# Patient Record
Sex: MALE | Race: WHITE | NOT HISPANIC OR LATINO | Employment: OTHER | URBAN - METROPOLITAN AREA
[De-identification: names, ages, dates, MRNs, and addresses within clinical notes are randomized per-mention and may not be internally consistent; named-entity substitution may affect disease eponyms.]

---

## 2018-11-01 PROBLEM — D64.9 ANEMIA, UNSPECIFIED: Status: ACTIVE | Noted: 2018-11-01

## 2018-11-14 DIAGNOSIS — D64.9 ANEMIA, UNSPECIFIED TYPE: ICD-10-CM

## 2018-11-14 LAB
FERRITIN: 1633 NG/ML (ref 30–400)
IRON SATURATION: 22 % (ref 11–46)
IRON: 82 UG/DL (ref 59–158)
TOTAL IRON BINDING CAPACITY: 373 UG/DL (ref 178–450)

## 2018-12-06 DIAGNOSIS — D64.9 ANEMIA, UNSPECIFIED TYPE: ICD-10-CM

## 2018-12-06 LAB
ALBUMIN SERPL-MCNC: 4 G/DL (ref 3.9–4.9)
ALP BLD-CCNC: 46 U/L (ref 35–104)
ALT SERPL-CCNC: 14 U/L (ref 0–41)
ANION GAP SERPL CALCULATED.3IONS-SCNC: 15 MEQ/L (ref 7–13)
AST SERPL-CCNC: 19 U/L (ref 0–40)
BILIRUB SERPL-MCNC: 0.3 MG/DL (ref 0–1.2)
BUN BLDV-MCNC: 28 MG/DL (ref 8–23)
CALCIUM SERPL-MCNC: 9.3 MG/DL (ref 8.6–10.2)
CHLORIDE BLD-SCNC: 103 MEQ/L (ref 98–107)
CO2: 22 MEQ/L (ref 22–29)
CREAT SERPL-MCNC: 1.15 MG/DL (ref 0.7–1.2)
FERRITIN: 1386 NG/ML (ref 30–400)
GFR AFRICAN AMERICAN: >60
GFR NON-AFRICAN AMERICAN: >60
GLOBULIN: 2.6 G/DL (ref 2.3–3.5)
GLUCOSE BLD-MCNC: 93 MG/DL (ref 74–109)
IRON SATURATION: 23 % (ref 11–46)
IRON: 83 UG/DL (ref 59–158)
POTASSIUM SERPL-SCNC: 4.5 MEQ/L (ref 3.5–5.1)
SODIUM BLD-SCNC: 140 MEQ/L (ref 132–144)
TOTAL IRON BINDING CAPACITY: 367 UG/DL (ref 178–450)
TOTAL PROTEIN: 6.6 G/DL (ref 6.4–8.1)

## 2019-01-08 DIAGNOSIS — D64.9 ANEMIA, UNSPECIFIED TYPE: ICD-10-CM

## 2019-01-08 LAB
ALBUMIN SERPL-MCNC: 4.4 G/DL (ref 3.9–4.9)
ALP BLD-CCNC: 78 U/L (ref 35–104)
ALT SERPL-CCNC: <5 U/L (ref 0–41)
ANION GAP SERPL CALCULATED.3IONS-SCNC: 15 MEQ/L (ref 7–13)
AST SERPL-CCNC: <5 U/L (ref 0–40)
BILIRUB SERPL-MCNC: <0.2 MG/DL (ref 0–1.2)
BUN BLDV-MCNC: 23 MG/DL (ref 8–23)
CALCIUM SERPL-MCNC: 9.7 MG/DL (ref 8.6–10.2)
CHLORIDE BLD-SCNC: 103 MEQ/L (ref 98–107)
CO2: 25 MEQ/L (ref 22–29)
CREAT SERPL-MCNC: 1.28 MG/DL (ref 0.7–1.2)
FERRITIN: 1617 NG/ML (ref 30–400)
GFR AFRICAN AMERICAN: >60
GFR NON-AFRICAN AMERICAN: 54.5
GLOBULIN: 2.9 G/DL (ref 2.3–3.5)
GLUCOSE BLD-MCNC: 111 MG/DL (ref 74–109)
IRON SATURATION: 21 % (ref 11–46)
IRON: 79 UG/DL (ref 59–158)
POTASSIUM SERPL-SCNC: 4.6 MEQ/L (ref 3.5–5.1)
SODIUM BLD-SCNC: 143 MEQ/L (ref 132–144)
TOTAL IRON BINDING CAPACITY: 380 UG/DL (ref 178–450)
TOTAL PROTEIN: 7.3 G/DL (ref 6.4–8.1)

## 2019-02-06 PROBLEM — E83.110 HEREDITARY HEMOCHROMATOSIS (HCC): Status: ACTIVE | Noted: 2019-02-06

## 2019-02-28 PROBLEM — K74.69 OTHER CIRRHOSIS OF LIVER (HCC): Status: ACTIVE | Noted: 2019-02-28

## 2019-03-05 DIAGNOSIS — E83.110 HEREDITARY HEMOCHROMATOSIS (HCC): ICD-10-CM

## 2019-03-05 DIAGNOSIS — K74.69 OTHER CIRRHOSIS OF LIVER (HCC): ICD-10-CM

## 2019-03-05 DIAGNOSIS — D64.9 ANEMIA, UNSPECIFIED TYPE: ICD-10-CM

## 2019-03-05 LAB
ALBUMIN SERPL-MCNC: 4.5 G/DL (ref 3.5–4.6)
ALP BLD-CCNC: 55 U/L (ref 35–104)
ALT SERPL-CCNC: 12 U/L (ref 0–41)
ANION GAP SERPL CALCULATED.3IONS-SCNC: 15 MEQ/L (ref 9–15)
AST SERPL-CCNC: 23 U/L (ref 0–40)
BILIRUB SERPL-MCNC: <0.2 MG/DL (ref 0.2–0.7)
BUN BLDV-MCNC: 26 MG/DL (ref 8–23)
CALCIUM SERPL-MCNC: 9.3 MG/DL (ref 8.5–9.9)
CHLORIDE BLD-SCNC: 102 MEQ/L (ref 95–107)
CO2: 23 MEQ/L (ref 20–31)
CREAT SERPL-MCNC: 1.28 MG/DL (ref 0.7–1.2)
FERRITIN: 1002 NG/ML (ref 30–400)
GFR AFRICAN AMERICAN: >60
GFR NON-AFRICAN AMERICAN: 54.5
GLOBULIN: 2.3 G/DL (ref 2.3–3.5)
GLUCOSE BLD-MCNC: 97 MG/DL (ref 70–99)
IRON SATURATION: 18 % (ref 11–46)
IRON: 73 UG/DL (ref 59–158)
POTASSIUM SERPL-SCNC: 4.3 MEQ/L (ref 3.4–4.9)
SODIUM BLD-SCNC: 140 MEQ/L (ref 135–144)
TOTAL IRON BINDING CAPACITY: 406 UG/DL (ref 178–450)
TOTAL PROTEIN: 6.8 G/DL (ref 6.3–8)

## 2019-05-07 DIAGNOSIS — D64.9 ANEMIA, UNSPECIFIED TYPE: ICD-10-CM

## 2019-05-07 DIAGNOSIS — E83.110 HEREDITARY HEMOCHROMATOSIS (HCC): ICD-10-CM

## 2019-05-07 DIAGNOSIS — K74.69 OTHER CIRRHOSIS OF LIVER (HCC): ICD-10-CM

## 2019-05-07 LAB
ALBUMIN SERPL-MCNC: 4.2 G/DL (ref 3.5–4.6)
ALP BLD-CCNC: 51 U/L (ref 35–104)
ALT SERPL-CCNC: 18 U/L (ref 0–41)
ANION GAP SERPL CALCULATED.3IONS-SCNC: 17 MEQ/L (ref 9–15)
AST SERPL-CCNC: 18 U/L (ref 0–40)
BILIRUB SERPL-MCNC: <0.2 MG/DL (ref 0.2–0.7)
BUN BLDV-MCNC: 29 MG/DL (ref 8–23)
CALCIUM SERPL-MCNC: 9.5 MG/DL (ref 8.5–9.9)
CHLORIDE BLD-SCNC: 103 MEQ/L (ref 95–107)
CO2: 21 MEQ/L (ref 20–31)
CREAT SERPL-MCNC: 1.28 MG/DL (ref 0.7–1.2)
FERRITIN: 977.9 NG/ML (ref 30–400)
GFR AFRICAN AMERICAN: >60
GFR NON-AFRICAN AMERICAN: 54.5
GLOBULIN: 2.7 G/DL (ref 2.3–3.5)
GLUCOSE BLD-MCNC: 79 MG/DL (ref 70–99)
POTASSIUM SERPL-SCNC: 4.4 MEQ/L (ref 3.4–4.9)
SODIUM BLD-SCNC: 141 MEQ/L (ref 135–144)
TOTAL PROTEIN: 6.9 G/DL (ref 6.3–8)

## 2023-03-01 PROBLEM — E78.5 HYPERLIPIDEMIA: Status: ACTIVE | Noted: 2023-03-01

## 2023-03-01 PROBLEM — E55.9 VITAMIN D DEFICIENCY: Status: ACTIVE | Noted: 2023-03-01

## 2023-03-01 PROBLEM — M19.90 OSTEOARTHRITIS: Status: ACTIVE | Noted: 2023-03-01

## 2023-03-01 PROBLEM — E83.19 INCREASED STORAGE IRON: Status: ACTIVE | Noted: 2023-03-01

## 2023-03-01 PROBLEM — I44.2 IDIOVENTRICULAR RHYTHM (MULTI): Status: ACTIVE | Noted: 2023-03-01

## 2023-03-01 PROBLEM — D64.9 ANEMIA: Status: ACTIVE | Noted: 2023-03-01

## 2023-03-01 PROBLEM — F32.1 DEPRESSION, MAJOR, SINGLE EPISODE, MODERATE (MULTI): Status: ACTIVE | Noted: 2023-03-01

## 2023-03-01 PROBLEM — M10.9 GOUT: Status: ACTIVE | Noted: 2023-03-01

## 2023-03-01 PROBLEM — J44.9 COPD (CHRONIC OBSTRUCTIVE PULMONARY DISEASE) (MULTI): Status: ACTIVE | Noted: 2023-03-01

## 2023-03-01 PROBLEM — M19.90 ARTHRITIS: Status: ACTIVE | Noted: 2023-03-01

## 2023-03-01 PROBLEM — B00.1 RECURRENT COLD SORES: Status: ACTIVE | Noted: 2023-03-01

## 2023-03-01 PROBLEM — R94.31 ABNORMAL EKG: Status: ACTIVE | Noted: 2023-03-01

## 2023-03-01 PROBLEM — I35.9 AORTIC VALVE DISORDER: Status: ACTIVE | Noted: 2023-03-01

## 2023-03-01 PROBLEM — I45.2 RBBB (RIGHT BUNDLE BRANCH BLOCK WITH LEFT ANTERIOR FASCICULAR BLOCK): Status: ACTIVE | Noted: 2023-03-01

## 2023-03-01 PROBLEM — C44.91 BASAL CELL CARCINOMA: Status: ACTIVE | Noted: 2023-03-01

## 2023-03-01 PROBLEM — J45.909 ASTHMA (HHS-HCC): Status: ACTIVE | Noted: 2023-03-01

## 2023-03-01 PROBLEM — J30.9 ALLERGIC RHINITIS: Status: ACTIVE | Noted: 2023-03-01

## 2023-03-01 PROBLEM — F32.A DEPRESSION: Status: ACTIVE | Noted: 2023-03-01

## 2023-03-01 PROBLEM — I44.30 AV BLOCK: Status: ACTIVE | Noted: 2023-03-01

## 2023-03-01 PROBLEM — I10 HYPERTENSION: Status: ACTIVE | Noted: 2023-03-01

## 2023-03-01 PROBLEM — R00.0 SINUS TACHYCARDIA: Status: ACTIVE | Noted: 2023-03-01

## 2023-03-01 PROBLEM — R73.9 HYPERGLYCEMIA: Status: ACTIVE | Noted: 2023-03-01

## 2023-03-01 PROBLEM — I49.9 IRREGULAR HEART BEAT: Status: ACTIVE | Noted: 2023-03-01

## 2023-03-01 PROBLEM — N18.31 CHRONIC KIDNEY DISEASE, STAGE 3A (MULTI): Status: ACTIVE | Noted: 2023-03-01

## 2023-03-01 PROBLEM — R93.1 ABNORMAL ECHOCARDIOGRAM: Status: ACTIVE | Noted: 2023-03-01

## 2023-03-01 PROBLEM — E53.8 VITAMIN B12 DEFICIENCY: Status: ACTIVE | Noted: 2023-03-01

## 2023-03-01 PROBLEM — K21.9 GERD (GASTROESOPHAGEAL REFLUX DISEASE): Status: ACTIVE | Noted: 2023-03-01

## 2023-03-01 RX ORDER — ALLOPURINOL 300 MG/1
1 TABLET ORAL DAILY
COMMUNITY
End: 2023-10-09

## 2023-03-01 RX ORDER — ACETAMINOPHEN 500 MG
1 TABLET ORAL DAILY
COMMUNITY

## 2023-03-01 RX ORDER — VENLAFAXINE HYDROCHLORIDE 150 MG/1
1 CAPSULE, EXTENDED RELEASE ORAL DAILY
COMMUNITY
Start: 2019-07-05 | End: 2023-12-12 | Stop reason: SDUPTHER

## 2023-03-01 RX ORDER — LISINOPRIL AND HYDROCHLOROTHIAZIDE 20; 25 MG/1; MG/1
1.5 TABLET ORAL DAILY
COMMUNITY
End: 2023-11-03 | Stop reason: SDUPTHER

## 2023-03-01 RX ORDER — VALACYCLOVIR HYDROCHLORIDE 1 G/1
1 TABLET, FILM COATED ORAL DAILY PRN
COMMUNITY

## 2023-03-01 RX ORDER — SIMVASTATIN 10 MG/1
1 TABLET, FILM COATED ORAL DAILY
COMMUNITY
End: 2023-03-20 | Stop reason: SDUPTHER

## 2023-03-01 RX ORDER — CHOLECALCIFEROL (VITAMIN D3) 25 MCG
TABLET,CHEWABLE ORAL
COMMUNITY

## 2023-03-01 RX ORDER — ASPIRIN 81 MG/1
1 TABLET ORAL DAILY
COMMUNITY

## 2023-03-01 RX ORDER — FENOFIBRIC ACID 135 MG/1
1 CAPSULE, DELAYED RELEASE ORAL DAILY
COMMUNITY
Start: 2021-02-02 | End: 2023-11-20 | Stop reason: SDUPTHER

## 2023-03-01 RX ORDER — OMEPRAZOLE 20 MG/1
1 CAPSULE, DELAYED RELEASE ORAL DAILY
COMMUNITY
End: 2023-09-05 | Stop reason: SDUPTHER

## 2023-03-20 ENCOUNTER — LAB (OUTPATIENT)
Dept: LAB | Facility: LAB | Age: 83
End: 2023-03-20
Payer: MEDICARE

## 2023-03-20 ENCOUNTER — OFFICE VISIT (OUTPATIENT)
Dept: PRIMARY CARE | Facility: CLINIC | Age: 83
End: 2023-03-20
Payer: MEDICARE

## 2023-03-20 VITALS
OXYGEN SATURATION: 99 % | BODY MASS INDEX: 27.77 KG/M2 | HEIGHT: 72 IN | SYSTOLIC BLOOD PRESSURE: 126 MMHG | RESPIRATION RATE: 16 BRPM | DIASTOLIC BLOOD PRESSURE: 70 MMHG | TEMPERATURE: 97.7 F | WEIGHT: 205 LBS | HEART RATE: 72 BPM

## 2023-03-20 DIAGNOSIS — F32.1 DEPRESSION, MAJOR, SINGLE EPISODE, MODERATE (MULTI): ICD-10-CM

## 2023-03-20 DIAGNOSIS — I10 PRIMARY HYPERTENSION: ICD-10-CM

## 2023-03-20 DIAGNOSIS — E78.5 HYPERLIPIDEMIA, UNSPECIFIED HYPERLIPIDEMIA TYPE: ICD-10-CM

## 2023-03-20 DIAGNOSIS — D64.9 ANEMIA, UNSPECIFIED TYPE: Primary | ICD-10-CM

## 2023-03-20 DIAGNOSIS — J42 CHRONIC BRONCHITIS, UNSPECIFIED CHRONIC BRONCHITIS TYPE (MULTI): ICD-10-CM

## 2023-03-20 DIAGNOSIS — I44.2 IDIOVENTRICULAR RHYTHM (MULTI): ICD-10-CM

## 2023-03-20 DIAGNOSIS — N18.31 CHRONIC KIDNEY DISEASE, STAGE 3A (MULTI): ICD-10-CM

## 2023-03-20 PROBLEM — R94.31 ABNORMAL EKG: Status: RESOLVED | Noted: 2023-03-01 | Resolved: 2023-03-20

## 2023-03-20 PROBLEM — F32.A DEPRESSION: Status: RESOLVED | Noted: 2023-03-01 | Resolved: 2023-03-20

## 2023-03-20 PROBLEM — R00.0 SINUS TACHYCARDIA: Status: RESOLVED | Noted: 2023-03-01 | Resolved: 2023-03-20

## 2023-03-20 PROBLEM — B00.1 RECURRENT COLD SORES: Status: RESOLVED | Noted: 2023-03-01 | Resolved: 2023-03-20

## 2023-03-20 PROBLEM — I49.9 IRREGULAR HEART BEAT: Status: RESOLVED | Noted: 2023-03-01 | Resolved: 2023-03-20

## 2023-03-20 LAB
ALANINE AMINOTRANSFERASE (SGPT) (U/L) IN SER/PLAS: 18 U/L (ref 10–52)
ALBUMIN (G/DL) IN SER/PLAS: 4.5 G/DL (ref 3.4–5)
ALKALINE PHOSPHATASE (U/L) IN SER/PLAS: 51 U/L (ref 33–136)
ANION GAP IN SER/PLAS: 13 MMOL/L (ref 10–20)
ASPARTATE AMINOTRANSFERASE (SGOT) (U/L) IN SER/PLAS: 20 U/L (ref 9–39)
BILIRUBIN TOTAL (MG/DL) IN SER/PLAS: 0.4 MG/DL (ref 0–1.2)
CALCIUM (MG/DL) IN SER/PLAS: 9.6 MG/DL (ref 8.6–10.3)
CARBON DIOXIDE, TOTAL (MMOL/L) IN SER/PLAS: 27 MMOL/L (ref 21–32)
CHLORIDE (MMOL/L) IN SER/PLAS: 103 MMOL/L (ref 98–107)
CHOLESTEROL (MG/DL) IN SER/PLAS: 232 MG/DL (ref 0–199)
CHOLESTEROL IN HDL (MG/DL) IN SER/PLAS: 39.3 MG/DL
CHOLESTEROL/HDL RATIO: 5.9
COBALAMIN (VITAMIN B12) (PG/ML) IN SER/PLAS: 477 PG/ML (ref 211–911)
CREATININE (MG/DL) IN SER/PLAS: 1.45 MG/DL (ref 0.5–1.3)
ERYTHROCYTE DISTRIBUTION WIDTH (RATIO) BY AUTOMATED COUNT: 13.6 % (ref 11.5–14.5)
ERYTHROCYTE MEAN CORPUSCULAR HEMOGLOBIN CONCENTRATION (G/DL) BY AUTOMATED: 33.2 G/DL (ref 32–36)
ERYTHROCYTE MEAN CORPUSCULAR VOLUME (FL) BY AUTOMATED COUNT: 98 FL (ref 80–100)
ERYTHROCYTES (10*6/UL) IN BLOOD BY AUTOMATED COUNT: 3.78 X10E12/L (ref 4.5–5.9)
GFR MALE: 48 ML/MIN/1.73M2
GLUCOSE (MG/DL) IN SER/PLAS: 92 MG/DL (ref 74–99)
HEMATOCRIT (%) IN BLOOD BY AUTOMATED COUNT: 37.1 % (ref 41–52)
HEMOGLOBIN (G/DL) IN BLOOD: 12.3 G/DL (ref 13.5–17.5)
LDL: ABNORMAL MG/DL (ref 0–99)
LEUKOCYTES (10*3/UL) IN BLOOD BY AUTOMATED COUNT: 7.8 X10E9/L (ref 4.4–11.3)
PLATELETS (10*3/UL) IN BLOOD AUTOMATED COUNT: 188 X10E9/L (ref 150–450)
POTASSIUM (MMOL/L) IN SER/PLAS: 4.3 MMOL/L (ref 3.5–5.3)
PROTEIN TOTAL: 6.8 G/DL (ref 6.4–8.2)
SODIUM (MMOL/L) IN SER/PLAS: 139 MMOL/L (ref 136–145)
TRIGLYCERIDE (MG/DL) IN SER/PLAS: 648 MG/DL (ref 0–149)
UREA NITROGEN (MG/DL) IN SER/PLAS: 24 MG/DL (ref 6–23)
VLDL: ABNORMAL MG/DL (ref 0–40)

## 2023-03-20 PROCEDURE — 80061 LIPID PANEL: CPT

## 2023-03-20 PROCEDURE — 80053 COMPREHEN METABOLIC PANEL: CPT

## 2023-03-20 PROCEDURE — 99214 OFFICE O/P EST MOD 30 MIN: CPT | Performed by: INTERNAL MEDICINE

## 2023-03-20 PROCEDURE — 82607 VITAMIN B-12: CPT

## 2023-03-20 PROCEDURE — 1159F MED LIST DOCD IN RCRD: CPT | Performed by: INTERNAL MEDICINE

## 2023-03-20 PROCEDURE — 1036F TOBACCO NON-USER: CPT | Performed by: INTERNAL MEDICINE

## 2023-03-20 PROCEDURE — 85027 COMPLETE CBC AUTOMATED: CPT

## 2023-03-20 PROCEDURE — 36415 COLL VENOUS BLD VENIPUNCTURE: CPT

## 2023-03-20 PROCEDURE — 3074F SYST BP LT 130 MM HG: CPT | Performed by: INTERNAL MEDICINE

## 2023-03-20 PROCEDURE — 3078F DIAST BP <80 MM HG: CPT | Performed by: INTERNAL MEDICINE

## 2023-03-20 RX ORDER — SIMVASTATIN 10 MG/1
10 TABLET, FILM COATED ORAL DAILY
Qty: 90 TABLET | Refills: 3 | Status: SHIPPED | OUTPATIENT
Start: 2023-03-20 | End: 2024-03-21

## 2023-03-20 ASSESSMENT — PATIENT HEALTH QUESTIONNAIRE - PHQ9
1. LITTLE INTEREST OR PLEASURE IN DOING THINGS: NOT AT ALL
SUM OF ALL RESPONSES TO PHQ9 QUESTIONS 1 AND 2: 0
2. FEELING DOWN, DEPRESSED OR HOPELESS: NOT AT ALL

## 2023-03-20 ASSESSMENT — ENCOUNTER SYMPTOMS
FEVER: 0
COUGH: 0
FATIGUE: 0
SHORTNESS OF BREATH: 0

## 2023-03-20 NOTE — PROGRESS NOTES
Subjective   Jamar Gill is a 83 y.o. male who presents for Hypertension and Hyperlipidemia.    HPI   Is not monitoring BP routinely.  Denies adverse sx's r/t HTN or Hyperlipidemia.  No voiced concerns.    Review of Systems   Constitutional:  Negative for fatigue and fever.   Respiratory:  Negative for cough and shortness of breath.    Cardiovascular:  Negative for chest pain and leg swelling.   All other systems reviewed and are negative.      Health Maintenance Due   Topic Date Due    Medicare Annual Wellness Visit (AWV)  Never done    Derm Melanoma Skin Check  Never done    DTaP/Tdap/Td Vaccines (3 - Td or Tdap) 11/10/2019       Objective   /70   Pulse 72   Temp 36.5 °C (97.7 °F)   Resp 16   Ht 1.829 m (6')   Wt 93 kg (205 lb)   SpO2 99%   BMI 27.80 kg/m²     Physical Exam  Vitals and nursing note reviewed.   Constitutional:       Appearance: Normal appearance.   HENT:      Head: Normocephalic.   Eyes:      Conjunctiva/sclera: Conjunctivae normal.      Pupils: Pupils are equal, round, and reactive to light.   Cardiovascular:      Rate and Rhythm: Normal rate and regular rhythm.      Pulses: Normal pulses.      Heart sounds: Normal heart sounds.   Pulmonary:      Effort: Pulmonary effort is normal.      Breath sounds: Normal breath sounds.   Musculoskeletal:         General: No swelling.      Cervical back: Neck supple.   Skin:     General: Skin is warm and dry.   Neurological:      General: No focal deficit present.      Mental Status: He is oriented to person, place, and time.         Assessment/Plan   Problem List Items Addressed This Visit       Anemia - Primary    Chronic kidney disease, stage 3a    COPD (chronic obstructive pulmonary disease) (CMS/HCC)    Depression, major, single episode, moderate (CMS/HCC)    Hyperlipidemia    Relevant Medications    simvastatin (Zocor) 10 mg tablet    Other Relevant Orders    Lipid Panel    Comprehensive Metabolic Panel    CBC    Vitamin B12    Follow Up  In [facility] - PCP    Hypertension    Idioventricular rhythm (CMS/HCC)     Stable based on symptoms and exam.  Continue established treatment plan and follow up at least yearly.  Check labs   Cont meds  Fu with cardiology and hematology as needed  Fu in 6 months for wellness

## 2023-03-21 LAB — CHOLESTEROL IN LDL (MG/DL) IN SER/PLAS BY DIRECT ASSAY: 129 MG/DL (ref 0–129)

## 2023-03-21 PROCEDURE — 83721 ASSAY OF BLOOD LIPOPROTEIN: CPT

## 2023-03-23 ENCOUNTER — TELEPHONE (OUTPATIENT)
Dept: PRIMARY CARE | Facility: CLINIC | Age: 83
End: 2023-03-23
Payer: MEDICARE

## 2023-03-23 NOTE — TELEPHONE ENCOUNTER
Pt made aware.    ----- Message from Samara Mcqueen CMA sent at 3/23/2023  9:24 AM EDT -----  LMTCB         ----- Message -----  From: Brenda Bains DO  Sent: 3/22/2023  12:44 PM EDT  To: Jerrica Clemens LPN    Call patient his labs look pretty good all stable except his triglycerides have gone up dramatically want him to be sure to take fish oil trilipix and simvastatin and watch animal fats in diet   Will recheck next visit

## 2023-09-05 ENCOUNTER — TELEPHONE (OUTPATIENT)
Dept: PRIMARY CARE | Facility: CLINIC | Age: 83
End: 2023-09-05
Payer: MEDICARE

## 2023-09-05 DIAGNOSIS — K21.9 GASTROESOPHAGEAL REFLUX DISEASE, UNSPECIFIED WHETHER ESOPHAGITIS PRESENT: ICD-10-CM

## 2023-09-05 RX ORDER — OMEPRAZOLE 20 MG/1
20 CAPSULE, DELAYED RELEASE ORAL DAILY
Qty: 90 CAPSULE | Refills: 3 | Status: SHIPPED | OUTPATIENT
Start: 2023-09-05 | End: 2024-09-04

## 2023-09-20 ENCOUNTER — OFFICE VISIT (OUTPATIENT)
Dept: PRIMARY CARE | Facility: CLINIC | Age: 83
End: 2023-09-20
Payer: MEDICARE

## 2023-09-20 ENCOUNTER — LAB (OUTPATIENT)
Dept: LAB | Facility: LAB | Age: 83
End: 2023-09-20
Payer: MEDICARE

## 2023-09-20 VITALS
BODY MASS INDEX: 27.36 KG/M2 | HEART RATE: 72 BPM | DIASTOLIC BLOOD PRESSURE: 76 MMHG | HEIGHT: 72 IN | TEMPERATURE: 97.9 F | SYSTOLIC BLOOD PRESSURE: 130 MMHG | OXYGEN SATURATION: 100 % | RESPIRATION RATE: 16 BRPM | WEIGHT: 202 LBS

## 2023-09-20 DIAGNOSIS — N18.31 CHRONIC KIDNEY DISEASE, STAGE 3A (MULTI): ICD-10-CM

## 2023-09-20 DIAGNOSIS — E78.5 HYPERLIPIDEMIA, UNSPECIFIED HYPERLIPIDEMIA TYPE: ICD-10-CM

## 2023-09-20 DIAGNOSIS — N40.0 BENIGN PROSTATIC HYPERPLASIA WITHOUT LOWER URINARY TRACT SYMPTOMS: ICD-10-CM

## 2023-09-20 DIAGNOSIS — Z00.00 HEALTHCARE MAINTENANCE: ICD-10-CM

## 2023-09-20 DIAGNOSIS — Z23 ENCOUNTER FOR IMMUNIZATION: ICD-10-CM

## 2023-09-20 DIAGNOSIS — I10 PRIMARY HYPERTENSION: ICD-10-CM

## 2023-09-20 DIAGNOSIS — N40.0 BENIGN PROSTATIC HYPERPLASIA WITHOUT LOWER URINARY TRACT SYMPTOMS: Primary | ICD-10-CM

## 2023-09-20 DIAGNOSIS — J41.0 SIMPLE CHRONIC BRONCHITIS (MULTI): ICD-10-CM

## 2023-09-20 DIAGNOSIS — E55.9 VITAMIN D DEFICIENCY: ICD-10-CM

## 2023-09-20 DIAGNOSIS — K74.69 OTHER CIRRHOSIS OF LIVER (MULTI): ICD-10-CM

## 2023-09-20 PROBLEM — D64.9 ANEMIA: Status: RESOLVED | Noted: 2023-03-01 | Resolved: 2023-09-20

## 2023-09-20 PROBLEM — R93.1 ABNORMAL ECHOCARDIOGRAM: Status: RESOLVED | Noted: 2023-03-01 | Resolved: 2023-09-20

## 2023-09-20 PROBLEM — M19.90 ARTHRITIS: Status: RESOLVED | Noted: 2023-03-01 | Resolved: 2023-09-20

## 2023-09-20 PROBLEM — J45.909 ASTHMA (HHS-HCC): Status: RESOLVED | Noted: 2023-03-01 | Resolved: 2023-09-20

## 2023-09-20 PROBLEM — R73.9 HYPERGLYCEMIA: Status: RESOLVED | Noted: 2023-03-01 | Resolved: 2023-09-20

## 2023-09-20 LAB
ALANINE AMINOTRANSFERASE (SGPT) (U/L) IN SER/PLAS: 21 U/L (ref 10–52)
ALBUMIN (G/DL) IN SER/PLAS: 4.3 G/DL (ref 3.4–5)
ALKALINE PHOSPHATASE (U/L) IN SER/PLAS: 47 U/L (ref 33–136)
ANION GAP IN SER/PLAS: 15 MMOL/L (ref 10–20)
ASPARTATE AMINOTRANSFERASE (SGOT) (U/L) IN SER/PLAS: 22 U/L (ref 9–39)
BILIRUBIN TOTAL (MG/DL) IN SER/PLAS: 0.4 MG/DL (ref 0–1.2)
CALCIDIOL (25 OH VITAMIN D3) (NG/ML) IN SER/PLAS: 48 NG/ML
CALCIUM (MG/DL) IN SER/PLAS: 9.7 MG/DL (ref 8.6–10.3)
CARBON DIOXIDE, TOTAL (MMOL/L) IN SER/PLAS: 25 MMOL/L (ref 21–32)
CHLORIDE (MMOL/L) IN SER/PLAS: 102 MMOL/L (ref 98–107)
CHOLESTEROL (MG/DL) IN SER/PLAS: 207 MG/DL (ref 0–199)
CHOLESTEROL IN HDL (MG/DL) IN SER/PLAS: 43 MG/DL
CHOLESTEROL/HDL RATIO: 4.8
COBALAMIN (VITAMIN B12) (PG/ML) IN SER/PLAS: 586 PG/ML (ref 211–911)
CREATININE (MG/DL) IN SER/PLAS: 1.59 MG/DL (ref 0.5–1.3)
ERYTHROCYTE DISTRIBUTION WIDTH (RATIO) BY AUTOMATED COUNT: 13.1 % (ref 11.5–14.5)
ERYTHROCYTE MEAN CORPUSCULAR HEMOGLOBIN CONCENTRATION (G/DL) BY AUTOMATED: 32.8 G/DL (ref 32–36)
ERYTHROCYTE MEAN CORPUSCULAR VOLUME (FL) BY AUTOMATED COUNT: 101 FL (ref 80–100)
ERYTHROCYTES (10*6/UL) IN BLOOD BY AUTOMATED COUNT: 3.7 X10E12/L (ref 4.5–5.9)
GFR MALE: 43 ML/MIN/1.73M2
GLUCOSE (MG/DL) IN SER/PLAS: 98 MG/DL (ref 74–99)
HEMATOCRIT (%) IN BLOOD BY AUTOMATED COUNT: 37.2 % (ref 41–52)
HEMOGLOBIN (G/DL) IN BLOOD: 12.2 G/DL (ref 13.5–17.5)
LDL: 85 MG/DL (ref 0–99)
LEUKOCYTES (10*3/UL) IN BLOOD BY AUTOMATED COUNT: 7.1 X10E9/L (ref 4.4–11.3)
NON HDL CHOLESTEROL: 164 MG/DL
PLATELETS (10*3/UL) IN BLOOD AUTOMATED COUNT: 191 X10E9/L (ref 150–450)
POTASSIUM (MMOL/L) IN SER/PLAS: 4.5 MMOL/L (ref 3.5–5.3)
PROSTATE SPECIFIC ANTIGEN,SCREEN: 2.71 NG/ML (ref 0–4)
PROTEIN TOTAL: 6.8 G/DL (ref 6.4–8.2)
SODIUM (MMOL/L) IN SER/PLAS: 137 MMOL/L (ref 136–145)
THYROTROPIN (MIU/L) IN SER/PLAS BY DETECTION LIMIT <= 0.05 MIU/L: 1.32 MIU/L (ref 0.44–3.98)
TRIGLYCERIDE (MG/DL) IN SER/PLAS: 397 MG/DL (ref 0–149)
UREA NITROGEN (MG/DL) IN SER/PLAS: 28 MG/DL (ref 6–23)
VLDL: 79 MG/DL (ref 0–40)

## 2023-09-20 PROCEDURE — G0439 PPPS, SUBSEQ VISIT: HCPCS | Performed by: INTERNAL MEDICINE

## 2023-09-20 PROCEDURE — 84443 ASSAY THYROID STIM HORMONE: CPT

## 2023-09-20 PROCEDURE — 36415 COLL VENOUS BLD VENIPUNCTURE: CPT

## 2023-09-20 PROCEDURE — 1126F AMNT PAIN NOTED NONE PRSNT: CPT | Performed by: INTERNAL MEDICINE

## 2023-09-20 PROCEDURE — 82607 VITAMIN B-12: CPT

## 2023-09-20 PROCEDURE — 3078F DIAST BP <80 MM HG: CPT | Performed by: INTERNAL MEDICINE

## 2023-09-20 PROCEDURE — 82306 VITAMIN D 25 HYDROXY: CPT

## 2023-09-20 PROCEDURE — 1159F MED LIST DOCD IN RCRD: CPT | Performed by: INTERNAL MEDICINE

## 2023-09-20 PROCEDURE — 1160F RVW MEDS BY RX/DR IN RCRD: CPT | Performed by: INTERNAL MEDICINE

## 2023-09-20 PROCEDURE — 85027 COMPLETE CBC AUTOMATED: CPT

## 2023-09-20 PROCEDURE — 84153 ASSAY OF PSA TOTAL: CPT

## 2023-09-20 PROCEDURE — 80061 LIPID PANEL: CPT

## 2023-09-20 PROCEDURE — G0008 ADMIN INFLUENZA VIRUS VAC: HCPCS | Performed by: INTERNAL MEDICINE

## 2023-09-20 PROCEDURE — 1036F TOBACCO NON-USER: CPT | Performed by: INTERNAL MEDICINE

## 2023-09-20 PROCEDURE — 1170F FXNL STATUS ASSESSED: CPT | Performed by: INTERNAL MEDICINE

## 2023-09-20 PROCEDURE — 90662 IIV NO PRSV INCREASED AG IM: CPT | Performed by: INTERNAL MEDICINE

## 2023-09-20 PROCEDURE — 3075F SYST BP GE 130 - 139MM HG: CPT | Performed by: INTERNAL MEDICINE

## 2023-09-20 PROCEDURE — 99214 OFFICE O/P EST MOD 30 MIN: CPT | Performed by: INTERNAL MEDICINE

## 2023-09-20 PROCEDURE — 80053 COMPREHEN METABOLIC PANEL: CPT

## 2023-09-20 ASSESSMENT — ENCOUNTER SYMPTOMS
FEVER: 0
LOSS OF SENSATION IN FEET: 0
DEPRESSION: 0
FATIGUE: 0
COUGH: 0
SHORTNESS OF BREATH: 0
OCCASIONAL FEELINGS OF UNSTEADINESS: 0

## 2023-09-20 ASSESSMENT — ACTIVITIES OF DAILY LIVING (ADL)
DOING_HOUSEWORK: INDEPENDENT
MANAGING_FINANCES: INDEPENDENT
TAKING_MEDICATION: INDEPENDENT
GROCERY_SHOPPING: INDEPENDENT
DRESSING: INDEPENDENT
BATHING: INDEPENDENT

## 2023-09-20 ASSESSMENT — PATIENT HEALTH QUESTIONNAIRE - PHQ9
2. FEELING DOWN, DEPRESSED OR HOPELESS: NOT AT ALL
1. LITTLE INTEREST OR PLEASURE IN DOING THINGS: NOT AT ALL
SUM OF ALL RESPONSES TO PHQ9 QUESTIONS 1 AND 2: 0

## 2023-09-20 ASSESSMENT — PAIN SCALES - GENERAL: PAINLEVEL: 0-NO PAIN

## 2023-09-20 NOTE — PROGRESS NOTES
Subjective   Reason for Visit: Jamar Gill is an 83 y.o. male here for a Medicare Wellness visit.     Past Medical, Surgical, and Family History reviewed and updated in chart.    Reviewed all medications by prescribing practitioner or clinical pharmacist (such as prescriptions, OTCs, herbal therapies and supplements) and documented in the medical record.    HPI  Influenza 2022 23  Covid 2021  PCV13 2015  PPSV23 2012  Shingrix 2019, 2020  Tdap 2019  Colonoscopy 2023  Adv Dir Yes  Psa 9/22  Bmi 27  Depression screen 23  Eye exam 23  Fall risk 23      Patient Care Team:  Brenda Bains DO as PCP - General  Brenda Bains DO as PCP - [facility] Attributed Provider     Review of Systems   Constitutional:  Negative for fatigue and fever.   Respiratory:  Negative for cough and shortness of breath.    Cardiovascular:  Negative for chest pain and leg swelling.   All other systems reviewed and are negative.      Objective   Vitals:  /76   Pulse 72   Temp 36.6 °C (97.9 °F)   Resp 16   Ht 1.829 m (6')   Wt 91.6 kg (202 lb)   SpO2 100%   BMI 27.40 kg/m²       Physical Exam  Constitutional:       General: He is not in acute distress.     Appearance: Normal appearance. He is well-developed.   HENT:      Head: Normocephalic and atraumatic.      Right Ear: Tympanic membrane and ear canal normal.      Left Ear: Tympanic membrane and ear canal normal.      Nose: Nose normal.      Mouth/Throat:      Mouth: Mucous membranes are moist.      Pharynx: Oropharynx is clear. No oropharyngeal exudate or posterior oropharyngeal erythema.   Eyes:      Conjunctiva/sclera: Conjunctivae normal.      Pupils: Pupils are equal, round, and reactive to light.   Neck:      Thyroid: No thyromegaly.      Vascular: No JVD.   Cardiovascular:      Rate and Rhythm: Normal rate and regular rhythm.      Heart sounds: Normal heart sounds. No murmur heard.  Pulmonary:      Effort: Pulmonary effort is normal. No respiratory distress.       Breath sounds: Normal breath sounds.   Abdominal:      General: Bowel sounds are normal. There is no distension.      Palpations: Abdomen is soft. There is no mass.      Tenderness: There is no abdominal tenderness.   Musculoskeletal:         General: Normal range of motion.      Cervical back: Normal range of motion and neck supple.   Lymphadenopathy:      Cervical: No cervical adenopathy.   Skin:     General: Skin is warm and dry.      Capillary Refill: Capillary refill takes less than 2 seconds.   Neurological:      General: No focal deficit present.      Mental Status: He is alert.         Assessment/Plan   Problem List Items Addressed This Visit       Chronic kidney disease, stage 3a (CMS/HCC)    Overview     21Mar2022      Brenda Bains  Chronic Condition Documentation: Stable based on last GFR. Continue established treatment plan including avoidance of nephrotoxins and follow up at least yearly         COPD (chronic obstructive pulmonary disease) (CMS/HCC)    Overview     21Mar2022      Brenda Bains  Chronic Condition Documentation: Stable based on symptoms and exam Continue established treatment plan and follow up at least yearly         Hyperlipidemia    Overview     [id number]      Brenda Bains  Check labs    Stable based on symptoms and exam Continue established treatment plan and follow up at least yearly         Hypertension    Overview     [id number]      Brenda Bains  Stable based on symptoms and exam Continue established treatment plan and follow up at least yearly         Vitamin D deficiency    Overview     [id number]         Brenda Bains  Check labs          Other Visit Diagnoses       Benign prostatic hyperplasia without lower urinary tract symptoms    -  Primary    Healthcare maintenance            Update preventive  Cont meds   Check labs   Stable based on symptoms and exam.  Continue established treatment plan and follow up at least yearly.

## 2023-09-21 ENCOUNTER — TELEPHONE (OUTPATIENT)
Dept: PRIMARY CARE | Facility: CLINIC | Age: 83
End: 2023-09-21
Payer: MEDICARE

## 2023-09-21 NOTE — TELEPHONE ENCOUNTER
Left vm to make aware and call back w/any questions or concerns.    SERGE Mora LPN  Line busy.  Will reattempt.          Previous Messages       ----- Message -----  From: Brenda Bains DO  Sent: 9/21/2023   2:43 PM EDT  To: Jerrica Clemens LPN    Call patient his labs look good  Chol improving  Kidneys and blood counts stable

## 2023-10-08 DIAGNOSIS — M10.9 GOUT, UNSPECIFIED CAUSE, UNSPECIFIED CHRONICITY, UNSPECIFIED SITE: ICD-10-CM

## 2023-10-09 RX ORDER — ALLOPURINOL 300 MG/1
300 TABLET ORAL DAILY
Qty: 90 TABLET | Refills: 0 | Status: SHIPPED | OUTPATIENT
Start: 2023-10-09 | End: 2024-01-02

## 2023-11-03 ENCOUNTER — TELEPHONE (OUTPATIENT)
Dept: PRIMARY CARE | Facility: CLINIC | Age: 83
End: 2023-11-03
Payer: MEDICARE

## 2023-11-03 DIAGNOSIS — I10 HYPERTENSION, UNSPECIFIED TYPE: ICD-10-CM

## 2023-11-03 RX ORDER — LISINOPRIL AND HYDROCHLOROTHIAZIDE 20; 25 MG/1; MG/1
1.5 TABLET ORAL DAILY
Qty: 135 TABLET | Refills: 3 | Status: SHIPPED | OUTPATIENT
Start: 2023-11-03 | End: 2024-11-02

## 2023-11-03 NOTE — TELEPHONE ENCOUNTER
Needs to have lisinopriL-hydrochlorothiazide 20-25 mg tablet refilled. Send to [facility] [address] [place], [place] - [address] [facility]

## 2023-11-20 ENCOUNTER — TELEPHONE (OUTPATIENT)
Dept: PRIMARY CARE | Facility: CLINIC | Age: 83
End: 2023-11-20
Payer: MEDICARE

## 2023-11-20 DIAGNOSIS — E78.5 HYPERLIPIDEMIA, UNSPECIFIED HYPERLIPIDEMIA TYPE: ICD-10-CM

## 2023-11-20 RX ORDER — FENOFIBRIC ACID 135 MG/1
135 CAPSULE, DELAYED RELEASE ORAL DAILY
Qty: 90 CAPSULE | Refills: 3 | Status: SHIPPED | OUTPATIENT
Start: 2023-11-20 | End: 2024-11-19

## 2023-12-12 ENCOUNTER — TELEPHONE (OUTPATIENT)
Dept: PRIMARY CARE | Facility: CLINIC | Age: 83
End: 2023-12-12
Payer: MEDICARE

## 2023-12-12 DIAGNOSIS — F32.1 DEPRESSION, MAJOR, SINGLE EPISODE, MODERATE (MULTI): ICD-10-CM

## 2023-12-12 RX ORDER — VENLAFAXINE HYDROCHLORIDE 150 MG/1
150 CAPSULE, EXTENDED RELEASE ORAL DAILY
Qty: 90 CAPSULE | Refills: 3 | Status: SHIPPED | OUTPATIENT
Start: 2023-12-12

## 2023-12-12 NOTE — TELEPHONE ENCOUNTER
Needs to have venlafaxine XR (Effexor-XR) 150 mg 24 hr capsule refilled. Send to [facility]/pharmacy #[id number] - [place], [place] - [address] AT [facility] [address]

## 2024-01-02 DIAGNOSIS — M10.9 GOUT, UNSPECIFIED CAUSE, UNSPECIFIED CHRONICITY, UNSPECIFIED SITE: ICD-10-CM

## 2024-01-02 RX ORDER — ALLOPURINOL 300 MG/1
300 TABLET ORAL DAILY
Qty: 90 TABLET | Refills: 3 | Status: SHIPPED | OUTPATIENT
Start: 2024-01-02

## 2024-03-04 ENCOUNTER — APPOINTMENT (OUTPATIENT)
Dept: PRIMARY CARE | Facility: CLINIC | Age: 84
End: 2024-03-04
Payer: MEDICARE

## 2024-03-12 ENCOUNTER — OFFICE VISIT (OUTPATIENT)
Dept: PRIMARY CARE | Facility: CLINIC | Age: 84
End: 2024-03-12
Payer: MEDICARE

## 2024-03-12 VITALS
RESPIRATION RATE: 18 BRPM | OXYGEN SATURATION: 97 % | WEIGHT: 201 LBS | BODY MASS INDEX: 27.26 KG/M2 | DIASTOLIC BLOOD PRESSURE: 76 MMHG | HEART RATE: 80 BPM | SYSTOLIC BLOOD PRESSURE: 128 MMHG | TEMPERATURE: 98.7 F

## 2024-03-12 DIAGNOSIS — R05.1 ACUTE COUGH: Primary | ICD-10-CM

## 2024-03-12 PROCEDURE — 1036F TOBACCO NON-USER: CPT | Performed by: NURSE PRACTITIONER

## 2024-03-12 PROCEDURE — 3078F DIAST BP <80 MM HG: CPT | Performed by: NURSE PRACTITIONER

## 2024-03-12 PROCEDURE — 99213 OFFICE O/P EST LOW 20 MIN: CPT | Performed by: NURSE PRACTITIONER

## 2024-03-12 PROCEDURE — 3074F SYST BP LT 130 MM HG: CPT | Performed by: NURSE PRACTITIONER

## 2024-03-12 PROCEDURE — 87637 SARSCOV2&INF A&B&RSV AMP PRB: CPT

## 2024-03-12 PROCEDURE — 1159F MED LIST DOCD IN RCRD: CPT | Performed by: NURSE PRACTITIONER

## 2024-03-12 PROCEDURE — 1126F AMNT PAIN NOTED NONE PRSNT: CPT | Performed by: NURSE PRACTITIONER

## 2024-03-12 PROCEDURE — 1160F RVW MEDS BY RX/DR IN RCRD: CPT | Performed by: NURSE PRACTITIONER

## 2024-03-12 ASSESSMENT — ENCOUNTER SYMPTOMS
CHILLS: 1
HEADACHES: 1
APPETITE CHANGE: 0
ACTIVITY CHANGE: 0
NAUSEA: 0
DIARRHEA: 0
CONSTIPATION: 0
SORE THROAT: 1
COUGH: 1
VOMITING: 0
FEVER: 1
RHINORRHEA: 1

## 2024-03-12 NOTE — PROGRESS NOTES
Subjective   Patient ID: Jamar Gill is a 84 y.o. male who presents for URI. PT has a home Covid test he can use.    Cold symptoms x1 week  Getting worse  Cough  Chest congestion  Sore throat  Low grade fever at the start/ chills  No GI issues    Has home covid test    OTC- mucinex/ tylenol    URI   Episode onset: 1 week. Associated symptoms include coughing, headaches, rhinorrhea and a sore throat. Pertinent negatives include no diarrhea, nausea or vomiting.        Review of Systems   Constitutional:  Positive for chills and fever. Negative for activity change and appetite change.   HENT:  Positive for postnasal drip, rhinorrhea and sore throat.    Respiratory:  Positive for cough.    Gastrointestinal:  Negative for constipation, diarrhea, nausea and vomiting.   Neurological:  Positive for headaches.   Psychiatric/Behavioral:  Negative for self-injury.        Objective   /76   Pulse 80   Temp 37.1 °C (98.7 °F)   Resp 18   Wt 91.2 kg (201 lb)   SpO2 97%   BMI 27.26 kg/m²     Physical Exam  Vitals reviewed.   Constitutional:       Appearance: Normal appearance.   HENT:      Head: Normocephalic.      Right Ear: Tympanic membrane, ear canal and external ear normal.      Left Ear: Tympanic membrane, ear canal and external ear normal.      Nose: Mucosal edema and congestion present.      Right Turbinates: Swollen.      Left Turbinates: Swollen.      Mouth/Throat:      Lips: Pink.      Mouth: Mucous membranes are moist.      Pharynx: Posterior oropharyngeal erythema present.   Eyes:      Extraocular Movements: Extraocular movements intact.      Conjunctiva/sclera: Conjunctivae normal.      Pupils: Pupils are equal, round, and reactive to light.   Cardiovascular:      Rate and Rhythm: Normal rate and regular rhythm.      Pulses: Normal pulses.      Heart sounds: Normal heart sounds.   Pulmonary:      Effort: Pulmonary effort is normal.      Breath sounds: Normal breath sounds.   Musculoskeletal:       Cervical back: Normal range of motion and neck supple.   Skin:     General: Skin is warm.      Capillary Refill: Capillary refill takes less than 2 seconds.   Neurological:      General: No focal deficit present.      Mental Status: He is alert and oriented to person, place, and time.   Psychiatric:         Mood and Affect: Mood normal.         Behavior: Behavior normal.         Assessment/Plan   Problem List Items Addressed This Visit             ICD-10-CM    Acute cough - Primary R05.1     Flu/Covid/ RSV swab to be sent; Will follow up on results  Encouraged daily use of Flonase and Zyrtec for symptom support  Can use Tylenol as needed for fever or pain  Hydration encouraged  If all testing is negative, will consider doxycycline for acute URI  Follow up with PCP if not improving over the next 3-4 days  ER for any SOB, difficulty breathing, uncontrolled fevers or worsening of symptoms           Relevant Orders    Sars-CoV-2 and Influenza A/B PCR    RSV PCR

## 2024-03-12 NOTE — ASSESSMENT & PLAN NOTE
Flu/Covid/ RSV swab to be sent; Will follow up on results  Encouraged daily use of Flonase and Zyrtec for symptom support  Can use Tylenol as needed for fever or pain  Hydration encouraged  If all testing is negative, will consider doxycycline for acute URI  Follow up with PCP if not improving over the next 3-4 days  ER for any SOB, difficulty breathing, uncontrolled fevers or worsening of symptoms

## 2024-03-13 LAB
FLUAV RNA RESP QL NAA+PROBE: NOT DETECTED
FLUBV RNA RESP QL NAA+PROBE: NOT DETECTED
RSV RNA RESP QL NAA+PROBE: NOT DETECTED
SARS-COV-2 RNA RESP QL NAA+PROBE: DETECTED

## 2024-03-20 ENCOUNTER — LAB (OUTPATIENT)
Dept: LAB | Facility: LAB | Age: 84
End: 2024-03-20
Payer: MEDICARE

## 2024-03-20 ENCOUNTER — OFFICE VISIT (OUTPATIENT)
Dept: PRIMARY CARE | Facility: CLINIC | Age: 84
End: 2024-03-20
Payer: MEDICARE

## 2024-03-20 VITALS
WEIGHT: 201 LBS | DIASTOLIC BLOOD PRESSURE: 78 MMHG | OXYGEN SATURATION: 97 % | HEIGHT: 72 IN | BODY MASS INDEX: 27.22 KG/M2 | TEMPERATURE: 97.6 F | HEART RATE: 72 BPM | SYSTOLIC BLOOD PRESSURE: 120 MMHG | RESPIRATION RATE: 16 BRPM

## 2024-03-20 DIAGNOSIS — E78.2 MIXED HYPERLIPIDEMIA: ICD-10-CM

## 2024-03-20 DIAGNOSIS — D69.2 OTHER NONTHROMBOCYTOPENIC PURPURA (CMS-HCC): ICD-10-CM

## 2024-03-20 DIAGNOSIS — U07.1 COVID-19: ICD-10-CM

## 2024-03-20 DIAGNOSIS — I44.2 IDIOVENTRICULAR RHYTHM (MULTI): ICD-10-CM

## 2024-03-20 DIAGNOSIS — I10 PRIMARY HYPERTENSION: ICD-10-CM

## 2024-03-20 DIAGNOSIS — E83.110 HEREDITARY HEMOCHROMATOSIS (CMS-HCC): Primary | ICD-10-CM

## 2024-03-20 DIAGNOSIS — E83.110 HEREDITARY HEMOCHROMATOSIS (CMS-HCC): ICD-10-CM

## 2024-03-20 DIAGNOSIS — N18.31 CHRONIC KIDNEY DISEASE, STAGE 3A (MULTI): ICD-10-CM

## 2024-03-20 DIAGNOSIS — F32.1 DEPRESSION, MAJOR, SINGLE EPISODE, MODERATE (MULTI): ICD-10-CM

## 2024-03-20 DIAGNOSIS — J41.0 SIMPLE CHRONIC BRONCHITIS (MULTI): ICD-10-CM

## 2024-03-20 DIAGNOSIS — K74.69 OTHER CIRRHOSIS OF LIVER (MULTI): ICD-10-CM

## 2024-03-20 LAB
ALBUMIN SERPL BCP-MCNC: 4.3 G/DL (ref 3.4–5)
ALP SERPL-CCNC: 46 U/L (ref 33–136)
ALT SERPL W P-5'-P-CCNC: 18 U/L (ref 10–52)
ANION GAP SERPL CALC-SCNC: 14 MMOL/L (ref 10–20)
AST SERPL W P-5'-P-CCNC: 18 U/L (ref 9–39)
BILIRUB SERPL-MCNC: 0.5 MG/DL (ref 0–1.2)
BUN SERPL-MCNC: 36 MG/DL (ref 6–23)
CALCIUM SERPL-MCNC: 9.9 MG/DL (ref 8.6–10.3)
CHLORIDE SERPL-SCNC: 104 MMOL/L (ref 98–107)
CHOLEST SERPL-MCNC: 226 MG/DL (ref 0–199)
CHOLESTEROL/HDL RATIO: 8.9
CO2 SERPL-SCNC: 25 MMOL/L (ref 21–32)
CREAT SERPL-MCNC: 1.64 MG/DL (ref 0.5–1.3)
EGFRCR SERPLBLD CKD-EPI 2021: 42 ML/MIN/1.73M*2
ERYTHROCYTE [DISTWIDTH] IN BLOOD BY AUTOMATED COUNT: 13.2 % (ref 11.5–14.5)
FERRITIN SERPL-MCNC: 1252 NG/ML (ref 20–300)
GLUCOSE SERPL-MCNC: 100 MG/DL (ref 74–99)
HCT VFR BLD AUTO: 37.2 % (ref 41–52)
HDLC SERPL-MCNC: 25.4 MG/DL
HGB BLD-MCNC: 12.6 G/DL (ref 13.5–17.5)
IRON SATN MFR SERPL: 30 % (ref 25–45)
IRON SERPL-MCNC: 132 UG/DL (ref 35–150)
LDLC SERPL CALC-MCNC: ABNORMAL MG/DL
MCH RBC QN AUTO: 33.7 PG (ref 26–34)
MCHC RBC AUTO-ENTMCNC: 33.9 G/DL (ref 32–36)
MCV RBC AUTO: 100 FL (ref 80–100)
NON HDL CHOLESTEROL: 201 MG/DL (ref 0–149)
NRBC BLD-RTO: 0 /100 WBCS (ref 0–0)
PLATELET # BLD AUTO: 279 X10*3/UL (ref 150–450)
POTASSIUM SERPL-SCNC: 4.5 MMOL/L (ref 3.5–5.3)
PROT SERPL-MCNC: 6.9 G/DL (ref 6.4–8.2)
RBC # BLD AUTO: 3.74 X10*6/UL (ref 4.5–5.9)
SODIUM SERPL-SCNC: 138 MMOL/L (ref 136–145)
TIBC SERPL-MCNC: 441 UG/DL (ref 240–445)
TRIGL SERPL-MCNC: 569 MG/DL (ref 0–149)
UIBC SERPL-MCNC: 309 UG/DL (ref 110–370)
VLDL: ABNORMAL
WBC # BLD AUTO: 7.1 X10*3/UL (ref 4.4–11.3)

## 2024-03-20 PROCEDURE — 83550 IRON BINDING TEST: CPT

## 2024-03-20 PROCEDURE — 80053 COMPREHEN METABOLIC PANEL: CPT

## 2024-03-20 PROCEDURE — 80061 LIPID PANEL: CPT

## 2024-03-20 PROCEDURE — 36415 COLL VENOUS BLD VENIPUNCTURE: CPT

## 2024-03-20 PROCEDURE — 1036F TOBACCO NON-USER: CPT | Performed by: INTERNAL MEDICINE

## 2024-03-20 PROCEDURE — 82728 ASSAY OF FERRITIN: CPT

## 2024-03-20 PROCEDURE — 1159F MED LIST DOCD IN RCRD: CPT | Performed by: INTERNAL MEDICINE

## 2024-03-20 PROCEDURE — 3078F DIAST BP <80 MM HG: CPT | Performed by: INTERNAL MEDICINE

## 2024-03-20 PROCEDURE — 83540 ASSAY OF IRON: CPT

## 2024-03-20 PROCEDURE — 1123F ACP DISCUSS/DSCN MKR DOCD: CPT | Performed by: INTERNAL MEDICINE

## 2024-03-20 PROCEDURE — 3074F SYST BP LT 130 MM HG: CPT | Performed by: INTERNAL MEDICINE

## 2024-03-20 PROCEDURE — 99214 OFFICE O/P EST MOD 30 MIN: CPT | Performed by: INTERNAL MEDICINE

## 2024-03-20 PROCEDURE — 85027 COMPLETE CBC AUTOMATED: CPT

## 2024-03-20 PROCEDURE — 1160F RVW MEDS BY RX/DR IN RCRD: CPT | Performed by: INTERNAL MEDICINE

## 2024-03-20 ASSESSMENT — PATIENT HEALTH QUESTIONNAIRE - PHQ9
SUM OF ALL RESPONSES TO PHQ9 QUESTIONS 1 AND 2: 0
1. LITTLE INTEREST OR PLEASURE IN DOING THINGS: NOT AT ALL
2. FEELING DOWN, DEPRESSED OR HOPELESS: NOT AT ALL

## 2024-03-20 ASSESSMENT — ENCOUNTER SYMPTOMS
SHORTNESS OF BREATH: 0
FEVER: 0
COUGH: 0
FATIGUE: 0

## 2024-03-20 NOTE — PROGRESS NOTES
Subjective   Jamar Gill is a 84 y.o. male who presents for 6 month Follow-up.    HPI   Denies adverse sx's r/t HTN.  Taking meds as Rx'd.    Covid positive 3/12/24.    Sx's 1-2 weeks prior to [facility] visit.  Sx's have mainly improved. Occasional non-productive cough.  Denies chest pain/heaviness/pressure, dyspnea.    Review of Systems   Constitutional:  Negative for fatigue and fever.   Respiratory:  Negative for cough and shortness of breath.    Cardiovascular:  Negative for chest pain and leg swelling.   All other systems reviewed and are negative.      Health Maintenance Due   Topic Date Due    Derm Melanoma Skin Check  Never done    Hepatitis A Vaccines (1 of 2 - Risk 2-dose series) Never done    Hepatitis B Vaccines (1 of 3 - Risk 3-dose series) Never done    COVID-19 Vaccine (4 - 2023-24 season) 09/01/2023       Objective   /78   Pulse 72   Temp 36.4 °C (97.6 °F)   Resp 16   Ht 1.829 m (6')   Wt 91.2 kg (201 lb)   SpO2 97%   BMI 27.26 kg/m²     Physical Exam  Vitals and nursing note reviewed.   Constitutional:       Appearance: Normal appearance.   HENT:      Head: Normocephalic.   Eyes:      Conjunctiva/sclera: Conjunctivae normal.      Pupils: Pupils are equal, round, and reactive to light.   Cardiovascular:      Rate and Rhythm: Normal rate and regular rhythm.      Pulses: Normal pulses.      Heart sounds: Normal heart sounds.   Pulmonary:      Effort: Pulmonary effort is normal.      Breath sounds: Normal breath sounds.   Musculoskeletal:         General: No swelling.      Cervical back: Neck supple.   Skin:     General: Skin is warm and dry.   Neurological:      General: No focal deficit present.      Mental Status: He is oriented to person, place, and time.         Assessment/Plan   Problem List Items Addressed This Visit       Chronic kidney disease, stage 3a (CMS/HCC)    COPD (chronic obstructive pulmonary disease) (CMS/HCC)    Depression, major, single episode, moderate  (CMS/HCC)    Hyperlipidemia    Relevant Orders    Lipid Panel    Hypertension    Relevant Orders    CBC    Comprehensive Metabolic Panel    Idioventricular rhythm (CMS/HCC)    Other cirrhosis of liver (CMS/HCC)    Hereditary hemochromatosis (CMS/HCC) - Primary    Relevant Orders    Ferritin    Iron and TIBC    Other nonthrombocytopenic purpura (CMS/HCC)     Other Visit Diagnoses       COVID-19              Cont meds  Check labs  Stable based on symptoms and exam.  Continue established treatment plan and follow up at least yearly.

## 2024-03-21 ENCOUNTER — TELEPHONE (OUTPATIENT)
Dept: PRIMARY CARE | Facility: CLINIC | Age: 84
End: 2024-03-21
Payer: MEDICARE

## 2024-03-21 DIAGNOSIS — E78.5 HYPERLIPIDEMIA, UNSPECIFIED HYPERLIPIDEMIA TYPE: ICD-10-CM

## 2024-03-21 RX ORDER — SIMVASTATIN 10 MG/1
10 TABLET, FILM COATED ORAL DAILY
Qty: 90 TABLET | Refills: 3 | Status: SHIPPED | OUTPATIENT
Start: 2024-03-21

## 2024-03-21 NOTE — RESULT ENCOUNTER NOTE
Call patient his lAbs look stable  Triglycerides are up  Rec low animal fat diet and no more than 2 alcoholic drinks per day

## 2024-03-21 NOTE — TELEPHONE ENCOUNTER
----- Message from Brenda Bains DO sent at 3/21/2024  5:10 PM EDT -----  Call patient his lAbs look stable  Triglycerides are up  Rec low animal fat diet and no more than 2 alcoholic drinks per day

## 2024-04-02 ENCOUNTER — OFFICE VISIT (OUTPATIENT)
Dept: CARDIOLOGY | Facility: CLINIC | Age: 84
End: 2024-04-02
Payer: MEDICARE

## 2024-04-02 VITALS
SYSTOLIC BLOOD PRESSURE: 102 MMHG | HEIGHT: 72 IN | BODY MASS INDEX: 27.22 KG/M2 | WEIGHT: 201 LBS | HEART RATE: 72 BPM | DIASTOLIC BLOOD PRESSURE: 56 MMHG

## 2024-04-02 DIAGNOSIS — E78.49 OTHER HYPERLIPIDEMIA: ICD-10-CM

## 2024-04-02 DIAGNOSIS — I35.8 AORTIC VALVE SCLEROSIS: ICD-10-CM

## 2024-04-02 DIAGNOSIS — E83.110 HEREDITARY HEMOCHROMATOSIS (CMS-HCC): ICD-10-CM

## 2024-04-02 DIAGNOSIS — I10 PRIMARY HYPERTENSION: Primary | ICD-10-CM

## 2024-04-02 DIAGNOSIS — R53.83 FATIGUE, UNSPECIFIED TYPE: ICD-10-CM

## 2024-04-02 PROCEDURE — 1036F TOBACCO NON-USER: CPT | Performed by: INTERNAL MEDICINE

## 2024-04-02 PROCEDURE — 3078F DIAST BP <80 MM HG: CPT | Performed by: INTERNAL MEDICINE

## 2024-04-02 PROCEDURE — 1159F MED LIST DOCD IN RCRD: CPT | Performed by: INTERNAL MEDICINE

## 2024-04-02 PROCEDURE — 1123F ACP DISCUSS/DSCN MKR DOCD: CPT | Performed by: INTERNAL MEDICINE

## 2024-04-02 PROCEDURE — 1160F RVW MEDS BY RX/DR IN RCRD: CPT | Performed by: INTERNAL MEDICINE

## 2024-04-02 PROCEDURE — 99214 OFFICE O/P EST MOD 30 MIN: CPT | Performed by: INTERNAL MEDICINE

## 2024-04-02 PROCEDURE — 3074F SYST BP LT 130 MM HG: CPT | Performed by: INTERNAL MEDICINE

## 2024-04-02 NOTE — PATIENT INSTRUCTIONS
"It was my pleasure to meet you.  I look forward to being your cardiologist.  I am a huge believer in communicating with my patients.  Please contact me at any time, if anything is not clear to you regarding anything we have discussed, or if new questions occur to you.     You should increase your intake of fresh fruits and vegetables.  Try to consume 9-12 servings per day of such foods.  You should increase your intake of deep sea fish such as salmon and tuna.  Try to get two servings per week of fish, but if you are a pregnant woman, talk to your obstetrician before increasing your fish intake.  You should increase your intake of unprocessed nuts such as walnuts or almonds.  Increase your intake of plant-based protein.  You should avoid fried foods.  Don't consume sugary or starchy foods and sugary drinks.  Avoid saturated fats.  Try not to dine at restaurants more than once per month, and don't dine at fast food places.  Try to get 7-9 hours of sleep every night.  Try to get 150 minutes per week of moderate intensity exercise (after I have cleared you to start an exercise program).  Try to maintain the appropriate weight for your height based on body mass index (BMI). Maintain your cholesterol, blood sugar, and blood pressure in the recommended respective normal ranges.  There is a wealth of information on the American Heart Association's website regarding this.  Just Google \"Life's Essential 8\" for more information.   Ask me about any of these details  if you have questions.    As your cardiologist, I will be available to you at any time to answer any question you have concerning your heart health.  My staff, Dhiraj can also answer any questions you may have.  Best of luck.       It is important for us to have an accurate list of the medications, supplements, and their doses.  It is also important for us to have an accurate list of your allergies.  Please bring this information to every appointment.  " This is a vital part of the quality of care you receive through all of your providers.

## 2024-04-02 NOTE — PROGRESS NOTES
Chief Complaint:   Please see below.     History Of Present Illness:    Jamar Gill is a 84 y.o. male presenting with aortic sclerosis.    This hypertensive, hyperlipidemic 84 years old man is a former patient of Dr. Arora, followed by Dr. Bains.  His stress echo in February 2021 was negative for ischemia at 4.4 METS.  His  echocardiogram in February 2021 disclosed an EF of about 65%, with sclerotic aortic valve otherwise no major valvular findings.     He has a history of hemochromatosis for which she undergoes periodic phlebotomy, and follows with Dr. Mccarty of hematology for this.      The patient denies chest discomfort, palpitations, orthopnea, PND, syncope, and near syncope.  The patient can do household chores without difficulty.  He can walk 1/4 mile before having to stop due to fatigue.   There has been no change in the patient's functional capacity in the past year.     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  He has CKD3.      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    Past Surgical History:  He has a past surgical history that includes Other surgical history (10/17/2019).      Social History:  He reports that he has never smoked. He has never used smokeless tobacco. He reports current alcohol use of about 12.0 standard drinks of alcohol per week. He reports that he does not use drugs.    Family History:  Family History   Adopted: Yes        Allergies:  Patient has no known allergies.    Outpatient Medications:  Current Outpatient Medications   Medication Instructions     allopurinol (ZYLOPRIM) 300 mg, oral, Daily    aspirin 81 mg EC tablet 1 tablet, oral, Daily    cholecalciferol (Vitamin D-3) 5,000 Units tablet 1 tablet, oral, Daily    choline fenofibrate (TRILIPIX) 135 mg, oral, Daily    cyanocobalamin (Vitamin B-12) 2,500 mcg tablet oral    fish oil concentrate (Omega-3) 120-180 mg capsule oral    lisinopriL-hydrochlorothiazide 20-25 mg tablet 1.5 tablets, oral, Daily    NON FORMULARY 1 capsule, oral, Daily, PREVAGEN 10MG ORAL CAPSULE    NON FORMULARY Unspecified Homeopathic; CBD caps  - Take 1 cap by mouth daily    omeprazole (PRILOSEC) 20 mg, oral, Daily    simvastatin (ZOCOR) 10 mg, oral, Daily    valACYclovir (Valtrex) 1 gram tablet 1 tablet, oral, Daily PRN    venlafaxine XR (EFFEXOR-XR) 150 mg, oral, Daily       Physical Exam:  GENERAL:  pleasant 84 year-old  HEENT: No xanthelasma  NECK: Supple, no palpable adenopathy or thyromegaly  CHEST: Clear to auscultation, respiratory effort unlabored  CARDIAC: RRR, normal S1 and S2, no audible rub, gallop, carotids are brisk, PMI is not displaced; there is a grade 1 systolic murmur heard best at the RSB.  ABD: Active bowel sounds, nontender, no organomegaly, no evidence of ascites  EXT: No clubbing, cyanosis, edema, or tenderness  NEURO: Awake, alert, appropriate, speech is fluent      Last Labs:  CBC -  Lab Results   Component Value Date    WBC 7.1 03/20/2024    HGB 12.6 (L) 03/20/2024    HCT 37.2 (L) 03/20/2024     03/20/2024     03/20/2024       CMP -  Lab Results   Component Value Date    CALCIUM 9.9 03/20/2024    PROT 6.9 03/20/2024    ALBUMIN 4.3 03/20/2024    AST 18 03/20/2024    ALT 18 03/20/2024    ALKPHOS 46 03/20/2024    BILITOT 0.5 03/20/2024       LIPID PANEL -   Lab Results   Component Value Date    CHOL 226 (H) 03/20/2024    TRIG 569 (H) 03/20/2024    HDL 25.4 03/20/2024    CHHDL 8.9 03/20/2024    LDLF 85 09/20/2023    VLDL  03/20/2024      Comment:      Unable to calculate VLDL.    NHDL 201 (H)  03/20/2024       RENAL FUNCTION PANEL -   Lab Results   Component Value Date    GLUCOSE 100 (H) 03/20/2024     03/20/2024    K 4.5 03/20/2024     03/20/2024    CO2 25 03/20/2024    ANIONGAP 14 03/20/2024    BUN 36 (H) 03/20/2024    CREATININE 1.64 (H) 03/20/2024    GFRMALE 43 (A) 09/20/2023    CALCIUM 9.9 03/20/2024    ALBUMIN 4.3 03/20/2024        Lab Results   Component Value Date    HGBA1C 5.4 09/01/2022         Lab review: I have Chemistry CMP:   Lab Results   Component Value Date    ALBUMIN 4.3 03/20/2024    CALCIUM 9.9 03/20/2024    CO2 25 03/20/2024    CREATININE 1.64 (H) 03/20/2024    GLUCOSE 100 (H) 03/20/2024    BILITOT 0.5 03/20/2024    PROT 6.9 03/20/2024    ALT 18 03/20/2024    AST 18 03/20/2024    ALKPHOS 46 03/20/2024   , Chemistry BMP   Lab Results   Component Value Date    GLUCOSE 100 (H) 03/20/2024    CALCIUM 9.9 03/20/2024    CO2 25 03/20/2024    CREATININE 1.64 (H) 03/20/2024   , CBC:  Lab Results   Component Value Date    WBC 7.1 03/20/2024    RBC 3.74 (L) 03/20/2024    HGB 12.6 (L) 03/20/2024    HCT 37.2 (L) 03/20/2024     03/20/2024    MCH 33.7 03/20/2024    MCHC 33.9 03/20/2024    RDW 13.2 03/20/2024    NRBC 0.0 03/20/2024   , and Lipids:   Lab Results   Component Value Date    CHOL 226 (H) 03/20/2024    HDL 25.4 03/20/2024    LDLCALC  03/20/2024      Comment:      The calculation of LDL and VLDL are inaccurate when the Triglycerides are greater than 400 mg/dL or when the patient is non-fasting. If LDL measurement is necessary contact the testing laboratory for an alternative LDL assay.                                  Near   Borderline      AGE      Desirable  Optimal    High     High     Very High     0-19 Y     0 - 109     ---    110-129   >/= 130     ----    20-24 Y     0 - 119     ---    120-159   >/= 160     ----      >24 Y     0 -  99   100-129  130-159   160-189     >/=190      TRIG 569 (H) 03/20/2024     Diagnostic review: I have personally reviewed the result(s)  of the stress echo from 2021 . Please see the report for complete details.    Assessment/Plan   Problem List Items Addressed This Visit          Cardiac and Vasculature    Hyperlipidemia    Hypertension - Primary    Relevant Orders    Follow Up In Cardiology    Aortic valve sclerosis    Relevant Orders    Follow Up In Cardiology       Gastrointestinal and Abdominal    Hereditary hemochromatosis (CMS/HCC)    Relevant Orders    MR cardiac morphology and function w and wo IV contrast       Symptoms and Signs    Fatigue    Relevant Orders    Follow Up In Cardiology     This 84-year-old hypertensive, hyperlipidemic man with chronic kidney disease stage III has symptoms of fatigue after walking about 1/4 mile.  He has a history of hemochromatosis.  As such, he may have cardiac involvement, possibly accounting for some of his symptoms.  He otherwise has no angina or palpitations or other cardiac related complaints.  Our approach:    1.  Cardiac MRI to assess for possible iron overload  2. HTN:  BP is well controlled.   We discussed sodium restriction, lifestyle modification, and the DASH diet.  I advised the patient to check BPs at home daily, and to contact me with an update in one month.  2.  Advised him to check in with his primary care physician regarding his chronic kidney disease and management of noncardiac medical problems as described.    Humberto Lewis MD

## 2024-04-19 ENCOUNTER — HOSPITAL ENCOUNTER (OUTPATIENT)
Dept: RADIOLOGY | Facility: CLINIC | Age: 84
End: 2024-04-19
Payer: MEDICARE

## 2024-04-24 ENCOUNTER — TELEPHONE (OUTPATIENT)
Dept: CARDIOLOGY | Facility: CLINIC | Age: 84
End: 2024-04-24
Payer: MEDICARE

## 2024-04-24 NOTE — TELEPHONE ENCOUNTER
MRI = 4/19/24  OV = 4/30/24  FYI to Dr. Lewis, patient canceled ordered MRI and follow up OV.  Did not reschedule at this time.  ---ssd.

## 2024-04-30 ENCOUNTER — APPOINTMENT (OUTPATIENT)
Dept: CARDIOLOGY | Facility: CLINIC | Age: 84
End: 2024-04-30
Payer: MEDICARE

## 2024-05-13 ENCOUNTER — HOSPITAL ENCOUNTER (OUTPATIENT)
Dept: RADIOLOGY | Facility: CLINIC | Age: 84
End: 2024-05-13
Payer: MEDICARE

## 2024-05-13 ENCOUNTER — APPOINTMENT (OUTPATIENT)
Dept: RADIOLOGY | Facility: CLINIC | Age: 84
End: 2024-05-13
Payer: MEDICARE

## 2024-05-14 ENCOUNTER — HOSPITAL ENCOUNTER (OUTPATIENT)
Dept: RADIOLOGY | Facility: CLINIC | Age: 84
Discharge: HOME | End: 2024-05-14
Payer: MEDICARE

## 2024-05-14 DIAGNOSIS — E83.110 HEREDITARY HEMOCHROMATOSIS (CMS-HCC): ICD-10-CM

## 2024-05-14 PROCEDURE — 75565 CARD MRI VELOC FLOW MAPPING: CPT

## 2024-05-14 PROCEDURE — 75561 CARDIAC MRI FOR MORPH W/DYE: CPT

## 2024-05-14 PROCEDURE — A9575 INJ GADOTERATE MEGLUMI 0.1ML: HCPCS | Mod: MUE | Performed by: INTERNAL MEDICINE

## 2024-05-14 PROCEDURE — 2550000001 HC RX 255 CONTRASTS: Mod: MUE | Performed by: INTERNAL MEDICINE

## 2024-05-14 PROCEDURE — 75565 CARD MRI VELOC FLOW MAPPING: CPT | Performed by: INTERNAL MEDICINE

## 2024-05-14 PROCEDURE — 75561 CARDIAC MRI FOR MORPH W/DYE: CPT | Performed by: INTERNAL MEDICINE

## 2024-05-14 RX ORDER — GADOTERATE MEGLUMINE 376.9 MG/ML
36 INJECTION INTRAVENOUS
Status: COMPLETED | OUTPATIENT
Start: 2024-05-14 | End: 2024-05-14

## 2024-05-14 RX ADMIN — GADOTERATE MEGLUMINE 36 ML: 376.9 INJECTION INTRAVENOUS at 15:31

## 2024-05-21 ENCOUNTER — APPOINTMENT (OUTPATIENT)
Dept: CARDIOLOGY | Facility: CLINIC | Age: 84
End: 2024-05-21
Payer: MEDICARE

## 2024-05-28 ENCOUNTER — APPOINTMENT (OUTPATIENT)
Dept: CARDIOLOGY | Facility: CLINIC | Age: 84
End: 2024-05-28
Payer: MEDICARE

## 2024-06-04 ENCOUNTER — OFFICE VISIT (OUTPATIENT)
Dept: CARDIOLOGY | Facility: CLINIC | Age: 84
End: 2024-06-04
Payer: MEDICARE

## 2024-06-04 VITALS
DIASTOLIC BLOOD PRESSURE: 62 MMHG | BODY MASS INDEX: 26.82 KG/M2 | HEART RATE: 72 BPM | HEIGHT: 72 IN | WEIGHT: 198 LBS | SYSTOLIC BLOOD PRESSURE: 112 MMHG

## 2024-06-04 DIAGNOSIS — I10 PRIMARY HYPERTENSION: ICD-10-CM

## 2024-06-04 DIAGNOSIS — E78.49 OTHER HYPERLIPIDEMIA: Primary | ICD-10-CM

## 2024-06-04 PROCEDURE — 1036F TOBACCO NON-USER: CPT | Performed by: INTERNAL MEDICINE

## 2024-06-04 PROCEDURE — 1159F MED LIST DOCD IN RCRD: CPT | Performed by: INTERNAL MEDICINE

## 2024-06-04 PROCEDURE — 1123F ACP DISCUSS/DSCN MKR DOCD: CPT | Performed by: INTERNAL MEDICINE

## 2024-06-04 PROCEDURE — 3074F SYST BP LT 130 MM HG: CPT | Performed by: INTERNAL MEDICINE

## 2024-06-04 PROCEDURE — 3078F DIAST BP <80 MM HG: CPT | Performed by: INTERNAL MEDICINE

## 2024-06-04 PROCEDURE — 99213 OFFICE O/P EST LOW 20 MIN: CPT | Performed by: INTERNAL MEDICINE

## 2024-06-04 NOTE — PATIENT INSTRUCTIONS

## 2024-07-10 ENCOUNTER — TELEPHONE (OUTPATIENT)
Dept: PEDIATRICS | Facility: CLINIC | Age: 84
End: 2024-07-10
Payer: MEDICARE

## 2024-07-10 DIAGNOSIS — M10.9 GOUT, UNSPECIFIED CAUSE, UNSPECIFIED CHRONICITY, UNSPECIFIED SITE: ICD-10-CM

## 2024-07-10 RX ORDER — ALLOPURINOL 300 MG/1
300 TABLET ORAL DAILY
Qty: 90 TABLET | Refills: 3 | Status: SHIPPED | OUTPATIENT
Start: 2024-07-10

## 2024-08-22 DIAGNOSIS — K21.9 GASTROESOPHAGEAL REFLUX DISEASE, UNSPECIFIED WHETHER ESOPHAGITIS PRESENT: ICD-10-CM

## 2024-08-22 RX ORDER — OMEPRAZOLE 20 MG/1
20 CAPSULE, DELAYED RELEASE ORAL DAILY
Qty: 90 CAPSULE | Refills: 1 | Status: SHIPPED | OUTPATIENT
Start: 2024-08-22

## 2024-09-23 ENCOUNTER — APPOINTMENT (OUTPATIENT)
Dept: PRIMARY CARE | Facility: CLINIC | Age: 84
End: 2024-09-23
Payer: MEDICARE

## 2024-09-23 VITALS
OXYGEN SATURATION: 100 % | DIASTOLIC BLOOD PRESSURE: 72 MMHG | BODY MASS INDEX: 26.95 KG/M2 | TEMPERATURE: 97.9 F | HEIGHT: 72 IN | RESPIRATION RATE: 16 BRPM | WEIGHT: 199 LBS | SYSTOLIC BLOOD PRESSURE: 130 MMHG | HEART RATE: 76 BPM

## 2024-09-23 DIAGNOSIS — Z00.00 HEALTHCARE MAINTENANCE: ICD-10-CM

## 2024-09-23 DIAGNOSIS — Z23 ENCOUNTER FOR IMMUNIZATION: ICD-10-CM

## 2024-09-23 DIAGNOSIS — N18.32 CHRONIC KIDNEY DISEASE, STAGE 3B (MULTI): Primary | ICD-10-CM

## 2024-09-23 DIAGNOSIS — I10 PRIMARY HYPERTENSION: ICD-10-CM

## 2024-09-23 DIAGNOSIS — E78.2 MIXED HYPERLIPIDEMIA: ICD-10-CM

## 2024-09-23 DIAGNOSIS — E55.9 VITAMIN D DEFICIENCY: ICD-10-CM

## 2024-09-23 DIAGNOSIS — E83.110 HEREDITARY HEMOCHROMATOSIS (CMS-HCC): ICD-10-CM

## 2024-09-23 DIAGNOSIS — N40.0 BENIGN PROSTATIC HYPERPLASIA WITHOUT LOWER URINARY TRACT SYMPTOMS: ICD-10-CM

## 2024-09-23 DIAGNOSIS — M15.9 PRIMARY OSTEOARTHRITIS INVOLVING MULTIPLE JOINTS: ICD-10-CM

## 2024-09-23 DIAGNOSIS — J41.0 SIMPLE CHRONIC BRONCHITIS (MULTI): ICD-10-CM

## 2024-09-23 DIAGNOSIS — E53.8 VITAMIN B12 DEFICIENCY: ICD-10-CM

## 2024-09-23 PROBLEM — R05.1 ACUTE COUGH: Status: RESOLVED | Noted: 2024-03-12 | Resolved: 2024-09-23

## 2024-09-23 PROBLEM — R53.83 FATIGUE: Status: RESOLVED | Noted: 2024-04-02 | Resolved: 2024-09-23

## 2024-09-23 PROCEDURE — 3078F DIAST BP <80 MM HG: CPT | Performed by: INTERNAL MEDICINE

## 2024-09-23 PROCEDURE — G0439 PPPS, SUBSEQ VISIT: HCPCS | Performed by: INTERNAL MEDICINE

## 2024-09-23 PROCEDURE — 99214 OFFICE O/P EST MOD 30 MIN: CPT | Performed by: INTERNAL MEDICINE

## 2024-09-23 PROCEDURE — 1158F ADVNC CARE PLAN TLK DOCD: CPT | Performed by: INTERNAL MEDICINE

## 2024-09-23 PROCEDURE — 3075F SYST BP GE 130 - 139MM HG: CPT | Performed by: INTERNAL MEDICINE

## 2024-09-23 PROCEDURE — 1123F ACP DISCUSS/DSCN MKR DOCD: CPT | Performed by: INTERNAL MEDICINE

## 2024-09-23 PROCEDURE — 90662 IIV NO PRSV INCREASED AG IM: CPT | Performed by: INTERNAL MEDICINE

## 2024-09-23 PROCEDURE — G0008 ADMIN INFLUENZA VIRUS VAC: HCPCS | Performed by: INTERNAL MEDICINE

## 2024-09-23 PROCEDURE — 1170F FXNL STATUS ASSESSED: CPT | Performed by: INTERNAL MEDICINE

## 2024-09-23 PROCEDURE — 1036F TOBACCO NON-USER: CPT | Performed by: INTERNAL MEDICINE

## 2024-09-23 PROCEDURE — 1159F MED LIST DOCD IN RCRD: CPT | Performed by: INTERNAL MEDICINE

## 2024-09-23 PROCEDURE — 1160F RVW MEDS BY RX/DR IN RCRD: CPT | Performed by: INTERNAL MEDICINE

## 2024-09-23 ASSESSMENT — ACTIVITIES OF DAILY LIVING (ADL)
MANAGING_FINANCES: INDEPENDENT
GROCERY_SHOPPING: INDEPENDENT
TAKING_MEDICATION: INDEPENDENT
BATHING: INDEPENDENT
DRESSING: INDEPENDENT
DOING_HOUSEWORK: INDEPENDENT

## 2024-09-23 ASSESSMENT — ENCOUNTER SYMPTOMS
COUGH: 0
SHORTNESS OF BREATH: 0
DEPRESSION: 0
OCCASIONAL FEELINGS OF UNSTEADINESS: 0
FEVER: 0
FATIGUE: 0
LOSS OF SENSATION IN FEET: 0

## 2024-09-23 NOTE — PROGRESS NOTES
Subjective   Reason for Visit: Jamar Gill is an 84 y.o. male here for a Medicare Wellness visit.     Past Medical, Surgical, and Family History reviewed and updated in chart.    Reviewed all medications by prescribing practitioner or clinical pharmacist (such as prescriptions, OTCs, herbal therapies and supplements) and documented in the medical record.    HPI  Influenza 2023 24  Covid 2021  PCV13 2015  PPSV23 2012  Shingrix 2019, 2020  Tdap 2019  Colonoscopy 2023  Adv Dir Yes  Psa 9/23  Depression screen 24  Eye exam 23  Fall risk 24  Bmi 26    Just saw dr mak and had labs      Patient Care Team:  Brenda Bains DO as PCP - General  Brenda Bains DO as PCP - [facility] Attributed Provider  Humberto Lewis MD as Consulting Physician (Cardiology)     Review of Systems   Constitutional:  Negative for fatigue and fever.   Respiratory:  Negative for cough and shortness of breath.    Cardiovascular:  Negative for chest pain and leg swelling.   All other systems reviewed and are negative.      Objective   Vitals:  /72   Pulse 76   Temp 36.6 °C (97.9 °F)   Resp 16   Ht 1.829 m (6')   Wt 90.3 kg (199 lb)   SpO2 100%   BMI 26.99 kg/m²       Physical Exam  Vitals and nursing note reviewed.   Constitutional:       Appearance: Normal appearance.   HENT:      Head: Normocephalic.   Eyes:      Conjunctiva/sclera: Conjunctivae normal.      Pupils: Pupils are equal, round, and reactive to light.   Cardiovascular:      Rate and Rhythm: Normal rate and regular rhythm.      Pulses: Normal pulses.      Heart sounds: Normal heart sounds.   Pulmonary:      Effort: Pulmonary effort is normal.      Breath sounds: Normal breath sounds.   Musculoskeletal:         General: No swelling.      Cervical back: Neck supple.   Skin:     General: Skin is warm and dry.   Neurological:      General: No focal deficit present.      Mental Status: He is oriented to person, place, and time.         Assessment &  Plan  Encounter for immunization    Orders:    Flu vaccine, trivalent, preservative free, HIGH-DOSE, age 65y+ (Fluzone)    Chronic kidney disease, stage 3b (Multi)         Healthcare maintenance         Simple chronic bronchitis (Multi)         Primary osteoarthritis involving multiple joints         Hereditary hemochromatosis (CMS-HCC)    Orders:    CBC; Future    Vitamin D deficiency    Orders:    Vitamin D 25-Hydroxy,Total (for eval of Vitamin D levels); Future    Vitamin B12 deficiency    Orders:    Vitamin B12; Future    Primary hypertension         Mixed hyperlipidemia    Orders:    Comprehensive Metabolic Panel; Future    Lipid Panel; Future    Thyroid Stimulating Hormone; Future    Benign prostatic hyperplasia without lower urinary tract symptoms    Orders:    Prostate Specific Antigen, Screen; Future     Update preventive  Cont meds  Check labs  Stable based on symptoms and exam.  Continue established treatment plan and follow up at least yearly. l

## 2024-09-23 NOTE — ASSESSMENT & PLAN NOTE
Orders:    Comprehensive Metabolic Panel; Future    Lipid Panel; Future    Thyroid Stimulating Hormone; Future

## 2024-09-25 ENCOUNTER — LAB (OUTPATIENT)
Dept: LAB | Facility: LAB | Age: 84
End: 2024-09-25
Payer: MEDICARE

## 2024-09-25 DIAGNOSIS — E78.2 MIXED HYPERLIPIDEMIA: ICD-10-CM

## 2024-09-25 DIAGNOSIS — E55.9 VITAMIN D DEFICIENCY: ICD-10-CM

## 2024-09-25 DIAGNOSIS — N40.0 BENIGN PROSTATIC HYPERPLASIA WITHOUT LOWER URINARY TRACT SYMPTOMS: ICD-10-CM

## 2024-09-25 DIAGNOSIS — E53.8 VITAMIN B12 DEFICIENCY: ICD-10-CM

## 2024-09-25 DIAGNOSIS — E83.110 HEREDITARY HEMOCHROMATOSIS (CMS-HCC): ICD-10-CM

## 2024-09-25 LAB
25(OH)D3 SERPL-MCNC: 46 NG/ML (ref 30–100)
ALBUMIN SERPL BCP-MCNC: 4.4 G/DL (ref 3.4–5)
ALP SERPL-CCNC: 55 U/L (ref 33–136)
ALT SERPL W P-5'-P-CCNC: 15 U/L (ref 10–52)
ANION GAP SERPL CALC-SCNC: 13 MMOL/L (ref 10–20)
AST SERPL W P-5'-P-CCNC: 18 U/L (ref 9–39)
BILIRUB SERPL-MCNC: 0.4 MG/DL (ref 0–1.2)
BUN SERPL-MCNC: 28 MG/DL (ref 6–23)
CALCIUM SERPL-MCNC: 9.5 MG/DL (ref 8.6–10.3)
CHLORIDE SERPL-SCNC: 102 MMOL/L (ref 98–107)
CHOLEST SERPL-MCNC: 211 MG/DL (ref 0–199)
CHOLESTEROL/HDL RATIO: 4.6
CO2 SERPL-SCNC: 28 MMOL/L (ref 21–32)
CREAT SERPL-MCNC: 1.47 MG/DL (ref 0.5–1.3)
EGFRCR SERPLBLD CKD-EPI 2021: 47 ML/MIN/1.73M*2
ERYTHROCYTE [DISTWIDTH] IN BLOOD BY AUTOMATED COUNT: 13.4 % (ref 11.5–14.5)
GLUCOSE SERPL-MCNC: 85 MG/DL (ref 74–99)
HCT VFR BLD AUTO: 36.7 % (ref 41–52)
HDLC SERPL-MCNC: 45.7 MG/DL
HGB BLD-MCNC: 12.3 G/DL (ref 13.5–17.5)
LDLC SERPL CALC-MCNC: ABNORMAL MG/DL
MCH RBC QN AUTO: 33.6 PG (ref 26–34)
MCHC RBC AUTO-ENTMCNC: 33.5 G/DL (ref 32–36)
MCV RBC AUTO: 100 FL (ref 80–100)
NON HDL CHOLESTEROL: 165 MG/DL (ref 0–149)
NRBC BLD-RTO: 0 /100 WBCS (ref 0–0)
PLATELET # BLD AUTO: 195 X10*3/UL (ref 150–450)
POTASSIUM SERPL-SCNC: 4.7 MMOL/L (ref 3.5–5.3)
PROT SERPL-MCNC: 6.8 G/DL (ref 6.4–8.2)
PSA SERPL-MCNC: 3.9 NG/ML
RBC # BLD AUTO: 3.66 X10*6/UL (ref 4.5–5.9)
SODIUM SERPL-SCNC: 138 MMOL/L (ref 136–145)
TRIGL SERPL-MCNC: 527 MG/DL (ref 0–149)
TSH SERPL-ACNC: 1.34 MIU/L (ref 0.44–3.98)
VIT B12 SERPL-MCNC: 601 PG/ML (ref 211–911)
VLDL: ABNORMAL
WBC # BLD AUTO: 8.5 X10*3/UL (ref 4.4–11.3)

## 2024-09-25 PROCEDURE — 84153 ASSAY OF PSA TOTAL: CPT

## 2024-09-25 PROCEDURE — 85027 COMPLETE CBC AUTOMATED: CPT

## 2024-09-25 PROCEDURE — 84443 ASSAY THYROID STIM HORMONE: CPT

## 2024-09-25 PROCEDURE — 82306 VITAMIN D 25 HYDROXY: CPT

## 2024-09-25 PROCEDURE — 80061 LIPID PANEL: CPT

## 2024-09-25 PROCEDURE — 36415 COLL VENOUS BLD VENIPUNCTURE: CPT

## 2024-09-25 PROCEDURE — 80053 COMPREHEN METABOLIC PANEL: CPT

## 2024-09-25 PROCEDURE — 82607 VITAMIN B-12: CPT

## 2024-09-26 ENCOUNTER — TELEPHONE (OUTPATIENT)
Dept: PRIMARY CARE | Facility: CLINIC | Age: 84
End: 2024-09-26
Payer: MEDICARE

## 2024-09-26 NOTE — TELEPHONE ENCOUNTER
----- Message from Brenda Bains sent at 9/26/2024  9:02 AM EDT -----  Call patient his labs look good and stable

## 2024-10-03 DIAGNOSIS — M15.0 PRIMARY OSTEOARTHRITIS INVOLVING MULTIPLE JOINTS: Primary | ICD-10-CM

## 2024-10-03 RX ORDER — MELOXICAM 15 MG/1
15 TABLET ORAL DAILY PRN
Qty: 30 TABLET | Refills: 1 | Status: SHIPPED | OUTPATIENT
Start: 2024-10-03 | End: 2025-10-03

## 2024-10-19 DIAGNOSIS — I10 HYPERTENSION, UNSPECIFIED TYPE: ICD-10-CM

## 2024-10-21 RX ORDER — LISINOPRIL AND HYDROCHLOROTHIAZIDE 20; 25 MG/1; MG/1
1.5 TABLET ORAL DAILY
Qty: 135 TABLET | Refills: 2 | Status: SHIPPED | OUTPATIENT
Start: 2024-10-21

## 2024-11-01 DIAGNOSIS — M15.0 PRIMARY OSTEOARTHRITIS INVOLVING MULTIPLE JOINTS: ICD-10-CM

## 2024-11-01 RX ORDER — MELOXICAM 15 MG/1
15 TABLET ORAL DAILY PRN
Qty: 30 TABLET | Refills: 6 | Status: SHIPPED | OUTPATIENT
Start: 2024-11-01 | End: 2025-11-01

## 2024-11-07 DIAGNOSIS — E78.5 HYPERLIPIDEMIA, UNSPECIFIED HYPERLIPIDEMIA TYPE: ICD-10-CM

## 2024-11-07 RX ORDER — FENOFIBRIC ACID 135 MG/1
135 CAPSULE, DELAYED RELEASE ORAL DAILY
Qty: 90 CAPSULE | Refills: 3 | Status: SHIPPED | OUTPATIENT
Start: 2024-11-07

## 2024-11-27 DIAGNOSIS — F32.1 DEPRESSION, MAJOR, SINGLE EPISODE, MODERATE (MULTI): ICD-10-CM

## 2024-11-27 RX ORDER — VENLAFAXINE HYDROCHLORIDE 150 MG/1
150 CAPSULE, EXTENDED RELEASE ORAL DAILY
Qty: 90 CAPSULE | Refills: 3 | Status: SHIPPED | OUTPATIENT
Start: 2024-11-27

## 2024-12-19 ENCOUNTER — OFFICE VISIT (OUTPATIENT)
Dept: PRIMARY CARE | Facility: CLINIC | Age: 84
End: 2024-12-19
Payer: MEDICARE

## 2024-12-19 VITALS
BODY MASS INDEX: 28.04 KG/M2 | DIASTOLIC BLOOD PRESSURE: 80 MMHG | WEIGHT: 207 LBS | SYSTOLIC BLOOD PRESSURE: 128 MMHG | OXYGEN SATURATION: 100 % | TEMPERATURE: 97.8 F | RESPIRATION RATE: 16 BRPM | HEIGHT: 72 IN | HEART RATE: 72 BPM

## 2024-12-19 DIAGNOSIS — R07.81 RIB PAIN ON RIGHT SIDE: Primary | ICD-10-CM

## 2024-12-19 PROCEDURE — 3079F DIAST BP 80-89 MM HG: CPT | Performed by: INTERNAL MEDICINE

## 2024-12-19 PROCEDURE — 1160F RVW MEDS BY RX/DR IN RCRD: CPT | Performed by: INTERNAL MEDICINE

## 2024-12-19 PROCEDURE — 1123F ACP DISCUSS/DSCN MKR DOCD: CPT | Performed by: INTERNAL MEDICINE

## 2024-12-19 PROCEDURE — 99214 OFFICE O/P EST MOD 30 MIN: CPT | Performed by: INTERNAL MEDICINE

## 2024-12-19 PROCEDURE — 93000 ELECTROCARDIOGRAM COMPLETE: CPT | Performed by: INTERNAL MEDICINE

## 2024-12-19 PROCEDURE — 3074F SYST BP LT 130 MM HG: CPT | Performed by: INTERNAL MEDICINE

## 2024-12-19 PROCEDURE — 1159F MED LIST DOCD IN RCRD: CPT | Performed by: INTERNAL MEDICINE

## 2024-12-19 RX ORDER — PREDNISONE 10 MG/1
TABLET ORAL
Qty: 30 TABLET | Refills: 0 | Status: SHIPPED | OUTPATIENT
Start: 2024-12-19 | End: 2024-12-28

## 2024-12-19 NOTE — PROGRESS NOTES
Medicare Wellness Visit  Plan for Preventive Care    A good way for you to stay healthy is to use preventive care.  Medicare covers many services that can help you stay healthy.* The goal of these services is to find any health problems as quickly as possible. Finding problems early can help make them easier to treat.  Your personal plan below lists the services you may need and when they are due.      Health Maintenance Summary     Shingles Vaccine (1 of 2)  Never done    Hepatitis B Vaccine (1 of 3 - 19+ 3-dose series)  Never done    DTaP/Tdap/Td Vaccine (1 - Tdap)  Overdue since 1/2/2004    Pneumococcal Vaccine 0-64 (2 of 2 - PCV)  Overdue since 5/17/2020    COVID-19 Vaccine (3 - Pfizer risk series)  Overdue since 11/4/2021    Medicare Advantage- Medicare Wellness Visit (Yearly - January to December)  Overdue since 1/1/2024    Depression Screening (Yearly)  Due soon on 4/26/2024    Influenza Vaccine (Season Ended)  Next due on 9/1/2024    Colorectal Cancer Risk - Colonoscopy (Every 5 Years)  Next due on 5/20/2026    Meningococcal Vaccine   Aged Out    HPV Vaccine   Aged Out    Colorectal Cancer Screen   Discontinued           Preventive Care for Women and Men    Heart Screenings (Cardiovascular):  Blood tests are used to check your cholesterol, lipid and triglyceride levels. High levels can increase your risk for heart disease and stroke. High levels can be treated with medications, diet and exercise. Lowering your levels can help keep your heart and blood vessels healthy.  Your provider will order these tests if they are needed.    An ultrasound is done to see if you have an abdominal aortic aneurysm (AAA).  This is an enlargement of one of the main blood vessels that delivers blood to the body.   In the [place], 9,000 deaths are caused by AAA.  You may not even know you have this problem and as many as 1 in 3 people will have a serious problem if it is not treated.  Early diagnosis allows for more  Subjective   Jamar Gill is a 84 y.o. male who presents for Chest Pain.    HPI   Pt c/o constant R sided chest pain x2.5 weeks ago.  Radiates into back.  Occasionally throbbing pain.  Denies reflux, N/V, dyspnea, dizziness.  Ibuprofen effective.    Pain in right chest  Lower into upper  No sob  No recent illness  Went to [place] and carried 50lb bag to car  [place] winded and rested  Otherwise breathing is normal  Started taking ibuprofen  It is effective      Review of Systems   Constitutional:  Negative for fatigue and fever.   Respiratory:  Negative for cough and shortness of breath.    Cardiovascular:  Positive for chest pain. Negative for leg swelling.   All other systems reviewed and are negative.      Health Maintenance Due   Topic Date Due    Derm Melanoma Skin Check  Never done    Hepatitis A Vaccines (1 of 2 - Risk 2-dose series) Never done    CKD: Urine Protein Screening  Never done    Hepatitis B Vaccines (1 of 3 - Risk 3-dose series) Never done    RSV High Risk: (Elderly (60+) or Pregnant Population) (1 - 1-dose 75+ series) Never done    COVID-19 Vaccine (4 - 2024-25 season) 09/01/2024       Objective   /80   Pulse 72   Temp 36.6 °C (97.8 °F)   Resp 16   Ht 1.829 m (6')   Wt 93.9 kg (207 lb)   SpO2 100%   BMI 28.07 kg/m²     Physical Exam  Vitals and nursing note reviewed.   Constitutional:       Appearance: Normal appearance.   HENT:      Head: Normocephalic.   Eyes:      Conjunctiva/sclera: Conjunctivae normal.      Pupils: Pupils are equal, round, and reactive to light.   Cardiovascular:      Rate and Rhythm: Normal rate and regular rhythm.      Pulses: Normal pulses.      Heart sounds: Normal heart sounds.   Pulmonary:      Effort: Pulmonary effort is normal.      Breath sounds: Normal breath sounds.   Musculoskeletal:         General: No swelling.      Cervical back: Neck supple.   Skin:     General: Skin is warm and dry.   Neurological:      General: No focal deficit present.       Mental Status: He is oriented to person, place, and time.         Assessment/Plan   Problem List Items Addressed This Visit    None  Visit Diagnoses       Rib pain on right side    -  Primary    Relevant Medications    predniSONE (Deltasone) 10 mg tablet    Other Relevant Orders    XR chest 2 views    ECG 12 lead (Clinic Performed) (Completed)          Will do trial of steroid  EKG  Cxr  To er if worsens  Stable based on symptoms and exam.  Continue established treatment plan and follow up at least yearly.          effective treatment and cure.  If you have a family history of AAA or are a male age 65-75 who has smoked, you are at higher risk of an AAA.  Your provider can order this test, if needed.    Colorectal Screening:  There are many tests that are used to check for cancer of your colon and rectum. You and your provider should discuss what test is best for you and when to have it done.  Options include:  Screening Colonoscopy: exam of the entire colon, seen through a flexible lighted tube.  Flexible Sigmoidoscopy: exam of the last third (sigmoid portion) of the colon and rectum, seen through a flexible lighted tube.  Cologuard DNA stool test: a sample of your stool is used to screen for cancer and unseen blood in your stool.  Fecal Occult Blood Test: a sample of your stool is studied to find any unseen blood    Flu Shot:  An immunization that helps to prevent influenza (the flu). You should get this every year. The best time to get the shot is in the fall.    Pneumococcal Shot:  Vaccines help prevent pneumococcal disease, which is any type of illness caused by Streptococcus pneumoniae bacteria. There are two kinds of pneumococcal vaccines available in the [place]:   Pneumococcal conjugate vaccines (PCV20 or Ohvzlab89®)  Pneumococcal polysaccharide vaccine (PPSV23 or Hkstlwcwn86®)  For those who have never received any pneumococcal conjugate vaccine, CDC recommends PVC20 for adults 65 years or older and adults 19 through 64 years old with certain medical conditions or risk factors.   For those who have previously received PCV13, this should be followed by a dose of PPSV23.     Hepatitis B Shot:  An immunization that helps to protect people from getting Hepatitis B. Hepatitis B is a virus that spreads through contact with infected blood or body fluids. Many people with the virus do not have symptoms.  The virus can lead to serious problems, such as liver disease. Some people are at higher risk than others. Your  doctor will tell you if you need this shot.     Diabetes Screening:  A test to measure sugar (glucose) in your blood is called a fasting blood sugar. Fasting means you cannot have food or drink for at least 8 hours before the test. This test can detect diabetes long before you may notice symptoms.    Glaucoma Screening:  Glaucoma screening is performed by your eye doctor. The test measures the fluid pressure inside your eyes to determine if you have glaucoma.     Hepatitis C Screening:  A blood test to see if you have the hepatitis C virus.  Hepatitis C attacks the liver and is a major cause of chronic liver disease.  Medicare will cover a single screening for all adults born between 1945 & 1965, or high risk patients (people who have injected illegal drugs or people who have had blood transfusions).  High risk patients who continue to inject illegal drugs can be screened for Hepatitis C every year.    Smoking and Tobacco-Use Cessation Counseling:  Tobacco is the single greatest cause of disease and early death in our country today. Medication and counseling together can increase a person’s chance of quitting for good.   Medicare covers two quitting attempts per year, with four counseling sessions per attempt (eight sessions in a 12 month period)    Preventive Screening tests for Women    Screening Mammograms and Breast Exams:  An x-ray of your breasts to check for breast cancer before you or your doctor may be able to feel it.  If breast cancer is found early it can usually be treated with success.    Pelvic Exams and Pap Tests:  An exam to check for cervical and vaginal cancer. A Pap test is a lab test in which cells are taken from your cervix and sent to the lab to look for signs of cervical cancer. If cancer of the cervix is found early, chances for a cure are good. Testing can generally end at age 65, or if a woman has a hysterectomy for a benign condition. Your provider may recommend more frequent testing if  certain abnormal results are found.    Bone Mass Measurements:  A painless x-ray of your bone density to see if you are at risk for a broken bone. Bone density refers to the thickness of bones or how tightly the bone tissue is packed.    Preventive Screening tests for Men    Prostate Screening:  Should you have a prostate cancer test (PSA)?  It is up to you to decide if you want a prostate cancer test. Talk to your clinician to find out if the test is right for you.  Things for you to consider and talk about should include:  Benefits and harms of the test  Your family history  How your race/ethnicity may influence the test  If the test may impact other medical conditions you have  Your values on screenings and treatments    *Medicare pays for many preventive services to keep you healthy. For some of these services, you might have to pay a deductible, coinsurance, and / or copayment.  The amounts vary depending on the type of services you need and the kind of Medicare health plan you have.    For further details on screenings offered by Medicare please visit: https://www.medicare.gov/coverage/preventive-screening-services

## 2024-12-20 ENCOUNTER — HOSPITAL ENCOUNTER (OUTPATIENT)
Dept: RADIOLOGY | Facility: CLINIC | Age: 84
Discharge: HOME | End: 2024-12-20
Payer: MEDICARE

## 2024-12-20 DIAGNOSIS — R07.81 RIB PAIN ON RIGHT SIDE: ICD-10-CM

## 2024-12-20 PROCEDURE — 71046 X-RAY EXAM CHEST 2 VIEWS: CPT

## 2024-12-20 ASSESSMENT — ENCOUNTER SYMPTOMS
FATIGUE: 0
FEVER: 0
COUGH: 0
SHORTNESS OF BREATH: 0

## 2024-12-23 ENCOUNTER — TELEPHONE (OUTPATIENT)
Dept: PRIMARY CARE | Facility: CLINIC | Age: 84
End: 2024-12-23
Payer: MEDICARE

## 2024-12-23 NOTE — TELEPHONE ENCOUNTER
----- Message from Brenda Bains sent at 12/23/2024  8:45 AM EST -----  Call patient his chest xray looks good  How is he doing

## 2025-01-08 ENCOUNTER — APPOINTMENT (OUTPATIENT)
Dept: RADIOLOGY | Facility: HOSPITAL | Age: 85
End: 2025-01-08
Payer: MEDICARE

## 2025-01-08 ENCOUNTER — TELEPHONE (OUTPATIENT)
Dept: PRIMARY CARE | Facility: CLINIC | Age: 85
End: 2025-01-08
Payer: MEDICARE

## 2025-01-08 ENCOUNTER — HOSPITAL ENCOUNTER (EMERGENCY)
Facility: HOSPITAL | Age: 85
Discharge: HOME | End: 2025-01-08
Attending: EMERGENCY MEDICINE
Payer: MEDICARE

## 2025-01-08 ENCOUNTER — APPOINTMENT (OUTPATIENT)
Dept: CARDIOLOGY | Facility: HOSPITAL | Age: 85
End: 2025-01-08
Payer: MEDICARE

## 2025-01-08 VITALS
BODY MASS INDEX: 28.04 KG/M2 | SYSTOLIC BLOOD PRESSURE: 172 MMHG | RESPIRATION RATE: 18 BRPM | HEART RATE: 63 BPM | TEMPERATURE: 97.7 F | OXYGEN SATURATION: 97 % | WEIGHT: 207 LBS | HEIGHT: 72 IN | DIASTOLIC BLOOD PRESSURE: 79 MMHG

## 2025-01-08 DIAGNOSIS — R07.9 CHEST PAIN, UNSPECIFIED TYPE: Primary | ICD-10-CM

## 2025-01-08 LAB
ALBUMIN SERPL BCP-MCNC: 4.4 G/DL (ref 3.4–5)
ALP SERPL-CCNC: 36 U/L (ref 33–136)
ALT SERPL W P-5'-P-CCNC: 17 U/L (ref 10–52)
ANION GAP SERPL CALC-SCNC: 15 MMOL/L (ref 10–20)
AST SERPL W P-5'-P-CCNC: 17 U/L (ref 9–39)
BASOPHILS # BLD AUTO: 0.02 X10*3/UL (ref 0–0.1)
BASOPHILS NFR BLD AUTO: 0.2 %
BILIRUB SERPL-MCNC: 0.5 MG/DL (ref 0–1.2)
BNP SERPL-MCNC: 97 PG/ML (ref 0–99)
BUN SERPL-MCNC: 33 MG/DL (ref 6–23)
CALCIUM SERPL-MCNC: 9.2 MG/DL (ref 8.6–10.3)
CARDIAC TROPONIN I PNL SERPL HS: 12 NG/L (ref 0–20)
CARDIAC TROPONIN I PNL SERPL HS: 12 NG/L (ref 0–20)
CHLORIDE SERPL-SCNC: 107 MMOL/L (ref 98–107)
CO2 SERPL-SCNC: 23 MMOL/L (ref 21–32)
CREAT SERPL-MCNC: 1.59 MG/DL (ref 0.5–1.3)
D DIMER PPP FEU-MCNC: 869 NG/ML FEU
EGFRCR SERPLBLD CKD-EPI 2021: 43 ML/MIN/1.73M*2
EOSINOPHIL # BLD AUTO: 0.15 X10*3/UL (ref 0–0.4)
EOSINOPHIL NFR BLD AUTO: 1.8 %
ERYTHROCYTE [DISTWIDTH] IN BLOOD BY AUTOMATED COUNT: 13.6 % (ref 11.5–14.5)
GLUCOSE SERPL-MCNC: 94 MG/DL (ref 74–99)
HCT VFR BLD AUTO: 36.2 % (ref 41–52)
HGB BLD-MCNC: 12 G/DL (ref 13.5–17.5)
IMM GRANULOCYTES # BLD AUTO: 0.04 X10*3/UL (ref 0–0.5)
IMM GRANULOCYTES NFR BLD AUTO: 0.5 % (ref 0–0.9)
INR PPP: 1 (ref 0.9–1.1)
LYMPHOCYTES # BLD AUTO: 2.26 X10*3/UL (ref 0.8–3)
LYMPHOCYTES NFR BLD AUTO: 26.7 %
MCH RBC QN AUTO: 32.5 PG (ref 26–34)
MCHC RBC AUTO-ENTMCNC: 33.1 G/DL (ref 32–36)
MCV RBC AUTO: 98 FL (ref 80–100)
MONOCYTES # BLD AUTO: 0.63 X10*3/UL (ref 0.05–0.8)
MONOCYTES NFR BLD AUTO: 7.5 %
NEUTROPHILS # BLD AUTO: 5.35 X10*3/UL (ref 1.6–5.5)
NEUTROPHILS NFR BLD AUTO: 63.3 %
NRBC BLD-RTO: 0 /100 WBCS (ref 0–0)
PLATELET # BLD AUTO: 163 X10*3/UL (ref 150–450)
POTASSIUM SERPL-SCNC: 4 MMOL/L (ref 3.5–5.3)
PROT SERPL-MCNC: 6.5 G/DL (ref 6.4–8.2)
PROTHROMBIN TIME: 11.2 SECONDS (ref 9.8–12.8)
RBC # BLD AUTO: 3.69 X10*6/UL (ref 4.5–5.9)
SODIUM SERPL-SCNC: 141 MMOL/L (ref 136–145)
WBC # BLD AUTO: 8.5 X10*3/UL (ref 4.4–11.3)

## 2025-01-08 PROCEDURE — 2550000001 HC RX 255 CONTRASTS: Performed by: EMERGENCY MEDICINE

## 2025-01-08 PROCEDURE — 36415 COLL VENOUS BLD VENIPUNCTURE: CPT | Performed by: EMERGENCY MEDICINE

## 2025-01-08 PROCEDURE — 71045 X-RAY EXAM CHEST 1 VIEW: CPT | Mod: FOREIGN READ | Performed by: RADIOLOGY

## 2025-01-08 PROCEDURE — 84484 ASSAY OF TROPONIN QUANT: CPT | Performed by: EMERGENCY MEDICINE

## 2025-01-08 PROCEDURE — 85610 PROTHROMBIN TIME: CPT | Performed by: EMERGENCY MEDICINE

## 2025-01-08 PROCEDURE — 71275 CT ANGIOGRAPHY CHEST: CPT

## 2025-01-08 PROCEDURE — 71045 X-RAY EXAM CHEST 1 VIEW: CPT

## 2025-01-08 PROCEDURE — 83880 ASSAY OF NATRIURETIC PEPTIDE: CPT | Performed by: EMERGENCY MEDICINE

## 2025-01-08 PROCEDURE — 2500000005 HC RX 250 GENERAL PHARMACY W/O HCPCS

## 2025-01-08 PROCEDURE — 93005 ELECTROCARDIOGRAM TRACING: CPT

## 2025-01-08 PROCEDURE — 2500000001 HC RX 250 WO HCPCS SELF ADMINISTERED DRUGS (ALT 637 FOR MEDICARE OP)

## 2025-01-08 PROCEDURE — 85379 FIBRIN DEGRADATION QUANT: CPT | Performed by: EMERGENCY MEDICINE

## 2025-01-08 PROCEDURE — 85025 COMPLETE CBC W/AUTO DIFF WBC: CPT | Performed by: EMERGENCY MEDICINE

## 2025-01-08 PROCEDURE — 80053 COMPREHEN METABOLIC PANEL: CPT | Performed by: EMERGENCY MEDICINE

## 2025-01-08 PROCEDURE — 99285 EMERGENCY DEPT VISIT HI MDM: CPT | Mod: 25 | Performed by: EMERGENCY MEDICINE

## 2025-01-08 RX ORDER — LIDOCAINE 560 MG/1
1 PATCH PERCUTANEOUS; TOPICAL; TRANSDERMAL ONCE
Status: DISCONTINUED | OUTPATIENT
Start: 2025-01-08 | End: 2025-01-08 | Stop reason: HOSPADM

## 2025-01-08 RX ORDER — ACETAMINOPHEN 325 MG/1
975 TABLET ORAL ONCE
Status: COMPLETED | OUTPATIENT
Start: 2025-01-08 | End: 2025-01-08

## 2025-01-08 RX ADMIN — IOHEXOL 60 ML: 350 INJECTION, SOLUTION INTRAVENOUS at 17:34

## 2025-01-08 RX ADMIN — LIDOCAINE 4% 1 PATCH: 40 PATCH TOPICAL at 16:35

## 2025-01-08 RX ADMIN — ACETAMINOPHEN 975 MG: 325 TABLET ORAL at 16:35

## 2025-01-08 ASSESSMENT — PAIN DESCRIPTION - LOCATION
LOCATION: CHEST
LOCATION: CHEST

## 2025-01-08 ASSESSMENT — PAIN DESCRIPTION - DESCRIPTORS
DESCRIPTORS: CRAMPING
DESCRIPTORS: ACHING
DESCRIPTORS: ACHING

## 2025-01-08 ASSESSMENT — COLUMBIA-SUICIDE SEVERITY RATING SCALE - C-SSRS
1. IN THE PAST MONTH, HAVE YOU WISHED YOU WERE DEAD OR WISHED YOU COULD GO TO SLEEP AND NOT WAKE UP?: NO
6. HAVE YOU EVER DONE ANYTHING, STARTED TO DO ANYTHING, OR PREPARED TO DO ANYTHING TO END YOUR LIFE?: NO
2. HAVE YOU ACTUALLY HAD ANY THOUGHTS OF KILLING YOURSELF?: NO

## 2025-01-08 ASSESSMENT — PAIN SCALES - GENERAL
PAINLEVEL_OUTOF10: 5 - MODERATE PAIN
PAINLEVEL_OUTOF10: 7
PAINLEVEL_OUTOF10: 3
PAINLEVEL_OUTOF10: 5 - MODERATE PAIN
PAINLEVEL_OUTOF10: 7

## 2025-01-08 ASSESSMENT — LIFESTYLE VARIABLES
HAVE PEOPLE ANNOYED YOU BY CRITICIZING YOUR DRINKING: NO
EVER FELT BAD OR GUILTY ABOUT YOUR DRINKING: NO
HAVE YOU EVER FELT YOU SHOULD CUT DOWN ON YOUR DRINKING: NO
TOTAL SCORE: 0
EVER HAD A DRINK FIRST THING IN THE MORNING TO STEADY YOUR NERVES TO GET RID OF A HANGOVER: NO

## 2025-01-08 ASSESSMENT — PAIN DESCRIPTION - ONSET: ONSET: GRADUAL

## 2025-01-08 ASSESSMENT — PAIN DESCRIPTION - PROGRESSION
CLINICAL_PROGRESSION: GRADUALLY WORSENING
CLINICAL_PROGRESSION: GRADUALLY IMPROVING

## 2025-01-08 ASSESSMENT — PAIN DESCRIPTION - FREQUENCY: FREQUENCY: INTERMITTENT

## 2025-01-08 ASSESSMENT — PAIN - FUNCTIONAL ASSESSMENT
PAIN_FUNCTIONAL_ASSESSMENT: 0-10
PAIN_FUNCTIONAL_ASSESSMENT: 0-10

## 2025-01-08 ASSESSMENT — PAIN DESCRIPTION - ORIENTATION
ORIENTATION: UPPER;LEFT
ORIENTATION: RIGHT

## 2025-01-08 NOTE — TELEPHONE ENCOUNTER
Patient calling states CP has never gone away from seeing you last, has progressively got worse.  Wants to make appointment to follow up from December appointment.  After consulting with Dr Bains advised ER for eval  to rule out heart issues and then call for follow up.  Patient agreed.

## 2025-01-08 NOTE — ED PROVIDER NOTES
EMERGENCY DEPARTMENT ENCOUNTER      Pt Name: Jamar Gill  MRN: [id number]  Birthdate 1940  Date of evaluation: 1/8/2025  Provider: Brien Bernard DO    CHIEF COMPLAINT       Chief Complaint   Patient presents with    Chest Pain     Patient states chest pain x 3 weeks         HISTORY OF PRESENT ILLNESS    HPI    84-year-old male with past medical history significant for CKD 3, hypertension, hyperlipidemia, reflux, hemochromatosis, known right bundle branch block and aortic stenosis, recent cardiac MRI in May which was unremarkable presenting to the Emergency Department for evaluation of chest pain over the past 3 weeks.  Patient states his chest pain initially started in his right lower ribs and then progressed up into his right upper chest and radiates into his back.  States his pain is sometimes dull sometimes sharp and is worse in certain positions while lying in bed.  Pain does improve with Motrin.  Chest pain is not exertional and patient denies any shortness of breath, diaphoresis, lower extremity pain or swelling.  Patient denies any personal or family history of cardiac disease.  Patient reports a history of basal cell carcinoma but otherwise no history of cancer.  Patient states he was sent home with prescription for prednisone taper which he states he thinks might have improved his symptoms.  Called his primary care physician today and told her that his chest pain has been getting worse and was told to go to the Emergency Department for evaluation.    Nursing Notes were reviewed.    PAST MEDICAL HISTORY     Past Medical History:   Diagnosis Date    Other disorders of iron metabolism 09/30/2021    Increased storage iron    Personal history of other endocrine, nutritional and metabolic disease 05/10/2021    History of hyperlipidemia         SURGICAL HISTORY       Past Surgical History:   Procedure Laterality Date    OTHER SURGICAL HISTORY  10/17/2019    Tonsillectomy with adenoidectomy          CURRENT MEDICATIONS       Discharge Medication List as of 1/8/2025  8:14 PM        CONTINUE these medications which have NOT CHANGED    Details   allopurinol (Zyloprim) 300 mg tablet Take 1 tablet (300 mg) by mouth once daily., Starting Wed 7/10/2024, Normal      aspirin 81 mg EC tablet Take 1 tablet (81 mg) by mouth once daily., Historical Med      cholecalciferol (Vitamin D-3) 5,000 Units tablet Take 1 tablet (5,000 Units) by mouth once daily., Historical Med      choline fenofibrate (Trilipix) 135 mg DR capsule TAKE 1 CAPSULE BY MOUTH ONCE DAILY, Starting Thu 11/7/2024, Normal      cyanocobalamin (Vitamin B-12) 2,500 mcg tablet Take by mouth., Historical Med      fish oil concentrate (Omega-3) 120-180 mg capsule Take by mouth., Historical Med      lisinopriL-hydrochlorothiazide 20-25 mg tablet TAKE 1 AND 1/2 TABLETS BY MOUTH ONCE DAILY, Starting Mon 10/21/2024, Normal      meloxicam (Mobic) 15 mg tablet Take 1 tablet (15 mg) by mouth once daily as needed for moderate pain (4 - 6)., Starting Fri 11/1/2024, Until Sat 11/1/2025 at 2359, Normal      NON FORMULARY Unspecified Homeopathic; CBD caps  - Take 1 cap by mouth daily, Starting Mon 3/21/2022, Historical Med      omeprazole (PriLOSEC) 20 mg DR capsule TAKE 1 CAPSULE BY MOUTH EVERY DAY, Starting Thu 8/22/2024, Normal      simvastatin (Zocor) 10 mg tablet TAKE 1 TABLET BY MOUTH EVERY DAY, Starting Thu 3/21/2024, Normal      valACYclovir (Valtrex) 1 gram tablet Take 1 tablet (1,000 mg) by mouth once daily as needed., Historical Med      venlafaxine XR (Effexor-XR) 150 mg 24 hr capsule TAKE 1 CAPSULE BY MOUTH ONCE DAILY., Starting Wed 11/27/2024, Normal             ALLERGIES     Patient has no known allergies.    FAMILY HISTORY       Family History   Adopted: Yes          SOCIAL HISTORY       Social History     Socioeconomic History    Marital status:    Tobacco Use    Smoking status: Never    Smokeless tobacco: Never   Vaping Use    Vaping status:  Never Used   Substance and Sexual Activity    Alcohol use: Yes     Alcohol/week: 12.0 standard drinks of alcohol     Types: 12 Standard drinks or equivalent per week    Drug use: Never    Sexual activity: Defer       SCREENINGS                        PHYSICAL EXAM    (up to 7 for level 4, 8 or more for level 5)     ED Triage Vitals [01/08/25 1424]   Temperature Heart Rate Respirations BP   36.4 °C (97.5 °F) 82 18 154/87      Pulse Ox Temp Source Heart Rate Source Patient Position   99 % Temporal Monitor Sitting      BP Location FiO2 (%)     Right arm --       Physical Exam  Vitals and nursing note reviewed.   Constitutional:       General: He is not in acute distress.     Appearance: He is well-developed. He is not ill-appearing, toxic-appearing or diaphoretic.   HENT:      Head: Normocephalic and atraumatic.   Neck:      Thyroid: No thyromegaly.      Vascular: No JVD.      Trachea: No tracheal deviation.   Cardiovascular:      Rate and Rhythm: Normal rate and regular rhythm.      Pulses:           Radial pulses are 2+ on the right side and 2+ on the left side.        Dorsalis pedis pulses are 2+ on the right side and 2+ on the left side.        Posterior tibial pulses are 2+ on the right side and 2+ on the left side.      Heart sounds: Normal heart sounds.   Pulmonary:      Effort: Pulmonary effort is normal.      Breath sounds: Normal breath sounds.   Chest:      Chest wall: No mass, deformity, tenderness, crepitus or edema.   Abdominal:      Palpations: There is no hepatomegaly, splenomegaly or mass.      Tenderness: There is no abdominal tenderness. There is no guarding or rebound.   Musculoskeletal:         General: Normal range of motion.      Cervical back: Normal range of motion and neck supple.      Right lower leg: No tenderness. No edema.      Left lower leg: No tenderness. No edema.   Lymphadenopathy:      Cervical: No cervical adenopathy.   Skin:     General: Skin is warm and dry.   Neurological:       General: No focal deficit present.      Mental Status: He is alert and oriented to person, place, and time.   Psychiatric:         Mood and Affect: Mood normal.         Behavior: Behavior normal.          DIAGNOSTIC RESULTS     LABS:  Labs Reviewed   CBC WITH AUTO DIFFERENTIAL - Abnormal       Result Value    WBC 8.5      nRBC 0.0      RBC 3.69 (*)     Hemoglobin 12.0 (*)     Hematocrit 36.2 (*)     MCV 98      MCH 32.5      MCHC 33.1      RDW 13.6      Platelets 163      Neutrophils % 63.3      Immature Granulocytes %, Automated 0.5      Lymphocytes % 26.7      Monocytes % 7.5      Eosinophils % 1.8      Basophils % 0.2      Neutrophils Absolute 5.35      Immature Granulocytes Absolute, Automated 0.04      Lymphocytes Absolute 2.26      Monocytes Absolute 0.63      Eosinophils Absolute 0.15      Basophils Absolute 0.02     COMPREHENSIVE METABOLIC PANEL - Abnormal    Glucose 94      Sodium 141      Potassium 4.0      Chloride 107      Bicarbonate 23      Anion Gap 15      Urea Nitrogen 33 (*)     Creatinine 1.59 (*)     eGFR 43 (*)     Calcium 9.2      Albumin 4.4      Alkaline Phosphatase 36      Total Protein 6.5      AST 17      Bilirubin, Total 0.5      ALT 17     D-DIMER, VTE EXCLUSION - Abnormal    D-Dimer, Quantitative VTE Exclusion 869 (*)     Narrative:     The VTE Exclusion D-Dimer assay is reported in ng/mL Fibrinogen Equivalent Units (FEU).    Per 's instructions for use, a value of less than 500 ng/mL (FEU) may help to exclude DVT or PE in outpatients when the assay is used with a clinical pretest probability assessment.([facility]EMR must utilize and document eCalc 'Wells Score Deep Vein Thrombosis Risk' for DVT exclusion only. Emergency Department should utilize  Guidelines for Emergency Department Use of the VTE Exclusion D-Dimer and Clinical Pretest probability assessment model for DVT or PE exclusion.)   B-TYPE NATRIURETIC PEPTIDE - Normal    BNP 97      Narrative:        <100 pg/mL - Heart  failure unlikely  100-299 pg/mL - Intermediate probability of acute heart                  failure exacerbation. Correlate with clinical                  context and patient history.    >=300 pg/mL - Heart Failure likely. Correlate with clinical                  context and patient history.    BNP testing is performed using different testing methodology at [facility] than at other [facility]. Direct result comparisons should only be made within the same method.      PROTIME-INR - Normal    Protime 11.2      INR 1.0     SERIAL TROPONIN-INITIAL - Normal    Troponin I, High Sensitivity 12      Narrative:     Less than 99th percentile of normal range cutoff-  Female and children under 18 years old <14 ng/L; Male <21 ng/L: Negative  Repeat testing should be performed if clinically indicated.     Female and children under 18 years old 14-50 ng/L; Male 21-50 ng/L:  Consistent with possible cardiac damage and possible increased clinical   risk. Serial measurements may help to assess extent of myocardial damage.     >50 ng/L: Consistent with cardiac damage, increased clinical risk and  myocardial infarction. Serial measurements may help assess extent of   myocardial damage.      NOTE: Children less than 1 year old may have higher baseline troponin   levels and results should be interpreted in conjunction with the overall   clinical context.     NOTE: Troponin I testing is performed using a different   testing methodology at [facility] than at other   [facility]. Direct result comparisons should only   be made within the same method.   SERIAL TROPONIN, 1 HOUR - Normal    Troponin I, High Sensitivity 12      Narrative:     Less than 99th percentile of normal range cutoff-  Female and children under 18 years old <14 ng/L; Male <21 ng/L: Negative  Repeat testing should be performed if clinically indicated.     Female and children under 18 years old 14-50 ng/L; Male 21-50 ng/L:  Consistent  with possible cardiac damage and possible increased clinical   risk. Serial measurements may help to assess extent of myocardial damage.     >50 ng/L: Consistent with cardiac damage, increased clinical risk and  myocardial infarction. Serial measurements may help assess extent of   myocardial damage.      NOTE: Children less than 1 year old may have higher baseline troponin   levels and results should be interpreted in conjunction with the overall   clinical context.     NOTE: Troponin I testing is performed using a different   testing methodology at [facility] than at other   [facility]. Direct result comparisons should only   be made within the same method.   TROPONIN SERIES- (INITIAL, 1 HR)    Narrative:     The following orders were created for panel order Troponin I Series, High Sensitivity (0, 1 HR).  Procedure                               Abnormality         Status                     ---------                               -----------         ------                     Troponin I, High Sensiti...[[id number]]  Normal              Final result               Troponin, High Sensitivi...[[id number]]  Normal              Final result                 Please view results for these tests on the individual orders.       All other labs were within normal range or not returned as of this dictation.    Imaging  CT angio chest for pulmonary embolism   Final Result   Addendum (preliminary) 1 of 1   ADDENDUM:   It was inadvertently stated in the impression that there is evidence   of pulmonary embolism or acute thoracic aortic pathology.      Final   Evidence of pulmonary embolism or acute thoracic aortic pathology.   Bibasilar atelectasis. No pulmonary consolidation.   Signed by Jose Aponte MD      XR chest 1 view   Final Result   No acute cardiopulmonary disease.   Signed by Yo Camacho DO           Procedures  Procedures     EMERGENCY DEPARTMENT COURSE/MDM:     Diagnoses as of 01/15/25 2044    Chest pain, unspecified type        Medical Decision Making    84-year-old male with past medical history significant for CKD 3, hypertension, hyperlipidemia, reflux, hemochromatosis, known right bundle branch block and aortic stenosis, recent cardiac MRI in May which was unremarkable presenting to the Emergency Department for evaluation of right upper chest pain.  Hemodynamically stable, no acute distress, nontoxic-appearing, afebrile.  History suggests musculoskeletal etiology and less likely cardiac pathology however given patient's age and chest pain not reproducible on exam cardiac workup ordered as well as D-dimer.  Tylenol and lidocaine patch ordered for pain.    EKG: Sinus rhythm with a ventricular rate of 79 bpm with a right bundle branch block and left anterior fascicular block, W[place] interval 254 ms,  ms, QTc 472 ms.  No evidence of acute ST changes noted.    D-dimer elevated at 869.  Labs otherwise unremarkable for acute pathology.  Chest x-ray unremarkable.  CT angio chest ordered to rule out PE which showed no evidence of acute pathology.  Patient deemed safe for discharge for outpatient follow-up with his primary care physician with strict return precautions.    Patient and or family in agreement and understanding of treatment plan.  All questions answered.      I reviewed the case with the attending ED physician. The attending ED physician agrees with the plan. Patient and/or patient´s representative was counseled regarding labs, imaging, likely diagnosis, and plan. All questions were answered.    ED Medications administered this visit:    Medications   acetaminophen (Tylenol) tablet 975 mg (975 mg oral Given 1/8/25 1635)   iohexol (OMNIPaque) 350 mg iodine/mL solution 60 mL (60 mL intravenous Given 1/8/25 1734)       New Prescriptions from this visit:    Discharge Medication List as of 1/8/2025  8:14 PM          Follow-up:  Brenda Bains DO  [address] [facility] [place]  [address]  [phone]    Call in 1 day          Final Impression:   1. Chest pain, unspecified type          (Please note that portions of this note were completed with a voice recognition program.  Efforts were made to edit the dictations but occasionally words are mis-transcribed.)     Alex Houston DO  Resident  01/08/25 2218       Alex Houston DO  Resident  01/08/25 2302      The patient was seen by the resident/fellow.  I have personally performed a substantive portion of the encounter.  I have seen and examined the patient; agree with the workup, evaluation, MDM, management and diagnosis.  The care plan has been discussed with the resident; I have reviewed the resident’s note and agree with the documented findings.                                                    Brien Bernard DO  01/15/25 9441

## 2025-01-09 ENCOUNTER — OFFICE VISIT (OUTPATIENT)
Dept: PRIMARY CARE | Facility: CLINIC | Age: 85
End: 2025-01-09
Payer: MEDICARE

## 2025-01-09 VITALS
OXYGEN SATURATION: 97 % | HEIGHT: 72 IN | TEMPERATURE: 97.7 F | SYSTOLIC BLOOD PRESSURE: 134 MMHG | RESPIRATION RATE: 16 BRPM | BODY MASS INDEX: 28.17 KG/M2 | DIASTOLIC BLOOD PRESSURE: 72 MMHG | WEIGHT: 208 LBS | HEART RATE: 72 BPM

## 2025-01-09 DIAGNOSIS — K74.69 OTHER CIRRHOSIS OF LIVER: ICD-10-CM

## 2025-01-09 DIAGNOSIS — I44.2 IDIOVENTRICULAR RHYTHM (MULTI): ICD-10-CM

## 2025-01-09 DIAGNOSIS — J42 CHRONIC BRONCHITIS, UNSPECIFIED CHRONIC BRONCHITIS TYPE (MULTI): ICD-10-CM

## 2025-01-09 DIAGNOSIS — N18.32 CHRONIC KIDNEY DISEASE, STAGE 3B (MULTI): ICD-10-CM

## 2025-01-09 DIAGNOSIS — F32.1 DEPRESSION, MAJOR, SINGLE EPISODE, MODERATE (MULTI): ICD-10-CM

## 2025-01-09 DIAGNOSIS — S29.011D MUSCLE STRAIN OF CHEST WALL, SUBSEQUENT ENCOUNTER: Primary | ICD-10-CM

## 2025-01-09 LAB
ATRIAL RATE: 79 BPM
P AXIS: 66 DEGREES
P OFFSET: 112 MS
P ONSET: 82 MS
PR INTERVAL: 254 MS
Q ONSET: 209 MS
QRS COUNT: 13 BEATS
QRS DURATION: 156 MS
QT INTERVAL: 412 MS
QTC CALCULATION(BAZETT): 472 MS
QTC FREDERICIA: 451 MS
R AXIS: -73 DEGREES
T AXIS: 37 DEGREES
T OFFSET: 415 MS
VENTRICULAR RATE: 79 BPM

## 2025-01-09 PROCEDURE — 99214 OFFICE O/P EST MOD 30 MIN: CPT | Performed by: INTERNAL MEDICINE

## 2025-01-09 PROCEDURE — 3075F SYST BP GE 130 - 139MM HG: CPT | Performed by: INTERNAL MEDICINE

## 2025-01-09 PROCEDURE — 1160F RVW MEDS BY RX/DR IN RCRD: CPT | Performed by: INTERNAL MEDICINE

## 2025-01-09 PROCEDURE — 1036F TOBACCO NON-USER: CPT | Performed by: INTERNAL MEDICINE

## 2025-01-09 PROCEDURE — 3078F DIAST BP <80 MM HG: CPT | Performed by: INTERNAL MEDICINE

## 2025-01-09 PROCEDURE — 1159F MED LIST DOCD IN RCRD: CPT | Performed by: INTERNAL MEDICINE

## 2025-01-09 PROCEDURE — 1123F ACP DISCUSS/DSCN MKR DOCD: CPT | Performed by: INTERNAL MEDICINE

## 2025-01-09 RX ORDER — LIDOCAINE 50 MG/G
1 PATCH TOPICAL DAILY
Qty: 30 PATCH | Refills: 0 | Status: SHIPPED | OUTPATIENT
Start: 2025-01-09 | End: 2026-01-09

## 2025-01-09 ASSESSMENT — ENCOUNTER SYMPTOMS
SHORTNESS OF BREATH: 0
FATIGUE: 0
FEVER: 0
COUGH: 0

## 2025-01-09 NOTE — DISCHARGE INSTRUCTIONS
Take Motrin and Tylenol for pain and call to schedule follow-up appointment with your primary care physician within next 24 to 48 hours for reevaluation.  Return to the emergency department medially for any new or worsening symptoms such as chest pain in the center of your chest particulate that radiates up into your jaw or in your left arm or into your back, difficulty breathing, lightheadedness, heart palpitations, or if you pass out.

## 2025-01-09 NOTE — PROGRESS NOTES
Subjective   Jamar Gill is a 84 y.o. male who presents for Hospital Follow-up.    HPI   [facility] ED eval 1/8/24  Dx: Chest pain  Labs, CXR, CTA chest, EKG  Med changes: none  Follow up: PCP  Continues w/R sided chest pain.  Taking Ibuprofen BID.  Moderately effective.    Wanting to discuss Osteo Bi-Flex for joint pain.    Review of Systems   Constitutional:  Negative for fatigue and fever.   Respiratory:  Negative for cough and shortness of breath.    Cardiovascular:  Negative for chest pain and leg swelling.   All other systems reviewed and are negative.      Health Maintenance Due   Topic Date Due    Derm Melanoma Skin Check  Never done    Hepatitis A Vaccines (1 of 2 - Risk 2-dose series) Never done    CKD: Urine Protein Screening  Never done    Hepatitis B Vaccines (1 of 3 - Risk 3-dose series) Never done    RSV High Risk: (Elderly (60+) or Pregnant Population) (1 - 1-dose 75+ series) Never done    COVID-19 Vaccine (4 - 2024-25 season) 09/01/2024       Objective   /72   Pulse 72   Temp 36.5 °C (97.7 °F)   Resp 16   Ht 1.829 m (6')   Wt 94.3 kg (208 lb)   SpO2 97%   BMI 28.21 kg/m²     Physical Exam  Vitals and nursing note reviewed.   Constitutional:       Appearance: Normal appearance.   HENT:      Head: Normocephalic.   Eyes:      Conjunctiva/sclera: Conjunctivae normal.      Pupils: Pupils are equal, round, and reactive to light.   Cardiovascular:      Rate and Rhythm: Normal rate and regular rhythm.      Pulses: Normal pulses.      Heart sounds: Normal heart sounds.   Pulmonary:      Effort: Pulmonary effort is normal.      Breath sounds: Normal breath sounds.   Musculoskeletal:         General: No swelling.      Cervical back: Neck supple.   Skin:     General: Skin is warm and dry.   Neurological:      General: No focal deficit present.      Mental Status: He is oriented to person, place, and time.     Palp tenderness of right pec/ upper rib     Assessment/Plan   Problem List Items  Addressed This Visit       COPD (chronic obstructive pulmonary disease) (Multi)    Depression, major, single episode, moderate (Multi)    Idioventricular rhythm (Multi)    Other cirrhosis of liver    Chronic kidney disease, stage 3b (Multi)     Other Visit Diagnoses       Muscle strain of chest wall, subsequent encounter    -  Primary    Relevant Medications    lidocaine (Lidoderm) 5 % patch        Reviewed records from er  Cont meds  Stable based on symptoms and exam.  Continue established treatment plan and follow up at least yearly.

## 2025-01-16 ENCOUNTER — TELEPHONE (OUTPATIENT)
Dept: PRIMARY CARE | Facility: CLINIC | Age: 85
End: 2025-01-16
Payer: MEDICARE

## 2025-01-16 NOTE — TELEPHONE ENCOUNTER
Pt called stating pain in chest is not improving.  Is worsening.  No pain when in bed at night, but as soon as he gets up for the day pain resumes.  Taking Ibuprofen 800mg (from son or spouse) daily.  Started Osteo Biflex.  Using heat.  Please advise.

## 2025-01-17 DIAGNOSIS — R07.89 ANTERIOR CHEST WALL PAIN: Primary | ICD-10-CM

## 2025-01-20 NOTE — TELEPHONE ENCOUNTER
Spoke w/pt.  Continues w/pain at rest.  Taking Ibuprofen.  Using Lidocaine patches.  Encouraged bone scan.  Voiced understanding.    Please contact pt to schedule.

## 2025-01-23 ENCOUNTER — HOSPITAL ENCOUNTER (OUTPATIENT)
Dept: RADIOLOGY | Facility: HOSPITAL | Age: 85
Discharge: HOME | End: 2025-01-23
Payer: MEDICARE

## 2025-01-23 DIAGNOSIS — R07.89 ANTERIOR CHEST WALL PAIN: ICD-10-CM

## 2025-01-23 PROCEDURE — 78315 BONE IMAGING 3 PHASE: CPT

## 2025-01-23 PROCEDURE — A9503 TC99M MEDRONATE: HCPCS | Performed by: INTERNAL MEDICINE

## 2025-01-23 PROCEDURE — 3430000001 HC RX 343 DIAGNOSTIC RADIOPHARMACEUTICALS: Performed by: INTERNAL MEDICINE

## 2025-01-23 RX ADMIN — TECHNETIUM TC 99M MEDRONATE 24.7 MILLICURIE: 25 INJECTION, POWDER, FOR SOLUTION INTRAVENOUS at 11:35

## 2025-01-24 ENCOUNTER — TELEPHONE (OUTPATIENT)
Dept: PRIMARY CARE | Facility: CLINIC | Age: 85
End: 2025-01-24
Payer: MEDICARE

## 2025-01-24 DIAGNOSIS — R07.81 RIB PAIN: Primary | ICD-10-CM

## 2025-01-24 NOTE — TELEPHONE ENCOUNTER
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

## 2025-01-24 NOTE — RESULT ENCOUNTER NOTE
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

## 2025-01-24 NOTE — TELEPHONE ENCOUNTER
"Pt made aware.  Stated he is doing about the same.  Continues w/chest pain. Mild improvement. States 9.5/10.  Pain is pretty consistent \"all of the time.\"  Using Lidocaine patches, taking Ibuprofen 800 mg BID.    L hip is tolerable.  Occasional pain w/turning.  "

## 2025-02-14 DIAGNOSIS — K21.9 GASTROESOPHAGEAL REFLUX DISEASE, UNSPECIFIED WHETHER ESOPHAGITIS PRESENT: ICD-10-CM

## 2025-02-14 RX ORDER — OMEPRAZOLE 20 MG/1
20 CAPSULE, DELAYED RELEASE ORAL DAILY
Qty: 90 CAPSULE | Refills: 3 | Status: SHIPPED | OUTPATIENT
Start: 2025-02-14

## 2025-02-15 DIAGNOSIS — E78.5 HYPERLIPIDEMIA, UNSPECIFIED HYPERLIPIDEMIA TYPE: ICD-10-CM

## 2025-02-17 RX ORDER — SIMVASTATIN 10 MG/1
10 TABLET, FILM COATED ORAL DAILY
Qty: 90 TABLET | Refills: 3 | Status: SHIPPED | OUTPATIENT
Start: 2025-02-17

## 2025-03-10 ENCOUNTER — TELEPHONE (OUTPATIENT)
Dept: PRIMARY CARE | Facility: CLINIC | Age: 85
End: 2025-03-10
Payer: MEDICARE

## 2025-03-24 ENCOUNTER — APPOINTMENT (OUTPATIENT)
Dept: PRIMARY CARE | Facility: CLINIC | Age: 85
End: 2025-03-24
Payer: MEDICARE

## 2025-03-31 ENCOUNTER — APPOINTMENT (OUTPATIENT)
Dept: PRIMARY CARE | Facility: CLINIC | Age: 85
End: 2025-03-31
Payer: MEDICARE

## 2025-03-31 VITALS
HEIGHT: 72 IN | WEIGHT: 205 LBS | OXYGEN SATURATION: 98 % | TEMPERATURE: 97.9 F | RESPIRATION RATE: 16 BRPM | BODY MASS INDEX: 27.77 KG/M2 | DIASTOLIC BLOOD PRESSURE: 68 MMHG | SYSTOLIC BLOOD PRESSURE: 130 MMHG | HEART RATE: 80 BPM

## 2025-03-31 DIAGNOSIS — D64.9 ANEMIA, UNSPECIFIED TYPE: ICD-10-CM

## 2025-03-31 DIAGNOSIS — N18.32 CHRONIC KIDNEY DISEASE, STAGE 3B (MULTI): Primary | ICD-10-CM

## 2025-03-31 DIAGNOSIS — E78.2 MIXED HYPERLIPIDEMIA: ICD-10-CM

## 2025-03-31 DIAGNOSIS — E83.19 INCREASED STORAGE IRON: ICD-10-CM

## 2025-03-31 DIAGNOSIS — E83.110 HEREDITARY HEMOCHROMATOSIS (CMS-HCC): ICD-10-CM

## 2025-03-31 DIAGNOSIS — I10 PRIMARY HYPERTENSION: ICD-10-CM

## 2025-03-31 PROCEDURE — 3075F SYST BP GE 130 - 139MM HG: CPT | Performed by: INTERNAL MEDICINE

## 2025-03-31 PROCEDURE — 1159F MED LIST DOCD IN RCRD: CPT | Performed by: INTERNAL MEDICINE

## 2025-03-31 PROCEDURE — 1123F ACP DISCUSS/DSCN MKR DOCD: CPT | Performed by: INTERNAL MEDICINE

## 2025-03-31 PROCEDURE — 3078F DIAST BP <80 MM HG: CPT | Performed by: INTERNAL MEDICINE

## 2025-03-31 PROCEDURE — 1160F RVW MEDS BY RX/DR IN RCRD: CPT | Performed by: INTERNAL MEDICINE

## 2025-03-31 PROCEDURE — 99214 OFFICE O/P EST MOD 30 MIN: CPT | Performed by: INTERNAL MEDICINE

## 2025-03-31 PROCEDURE — G2211 COMPLEX E/M VISIT ADD ON: HCPCS | Performed by: INTERNAL MEDICINE

## 2025-03-31 PROCEDURE — 1036F TOBACCO NON-USER: CPT | Performed by: INTERNAL MEDICINE

## 2025-03-31 ASSESSMENT — ENCOUNTER SYMPTOMS
SHORTNESS OF BREATH: 0
FATIGUE: 0
FEVER: 0
COUGH: 0

## 2025-03-31 NOTE — PROGRESS NOTES
Subjective   Jamar Gill is a 85 y.o. male who presents for Follow-up.    HPI   Followed up w/Ortho regarding rib pain.  Somewhat improved.  Occasional pain, but is managing better.    Cutting down on alcohol consumption.    Review of Systems   Constitutional:  Negative for fatigue and fever.   Respiratory:  Negative for cough and shortness of breath.    Cardiovascular:  Negative for chest pain and leg swelling.   All other systems reviewed and are negative.      Health Maintenance Due   Topic Date Due    Derm Melanoma Skin Check  Never done    Hepatitis A Vaccines (1 of 2 - Risk 2-dose series) Never done    CKD: Urine Protein Screening  Never done    Hepatitis B Vaccines (1 of 3 - Risk 3-dose series) Never done    RSV High Risk: (Elderly (60+) or Pregnant Population) (1 - 1-dose 75+ series) Never done    COVID-19 Vaccine (4 - 2024-25 season) 09/01/2024       Objective   /68   Pulse 80   Temp 36.6 °C (97.9 °F)   Resp 16   Ht 1.829 m (6')   Wt 93 kg (205 lb)   SpO2 98%   BMI 27.80 kg/m²     Physical Exam  Vitals and nursing note reviewed.   Constitutional:       Appearance: Normal appearance.   HENT:      Head: Normocephalic.   Eyes:      Conjunctiva/sclera: Conjunctivae normal.      Pupils: Pupils are equal, round, and reactive to light.   Cardiovascular:      Rate and Rhythm: Normal rate and regular rhythm.      Pulses: Normal pulses.      Heart sounds: Normal heart sounds.   Pulmonary:      Effort: Pulmonary effort is normal.      Breath sounds: Normal breath sounds.   Musculoskeletal:         General: No swelling.      Cervical back: Neck supple.   Skin:     General: Skin is warm and dry.   Neurological:      General: No focal deficit present.      Mental Status: He is oriented to person, place, and time.         Assessment/Plan   Problem List Items Addressed This Visit       Hyperlipidemia    Hypertension    Increased storage iron    Hereditary hemochromatosis (CMS-HCC)    Chronic kidney  disease, stage 3b (Multi) - Primary     Other Visit Diagnoses       Anemia, unspecified type        Relevant Orders    Folate    Vitamin B12    Iron and TIBC    Ferritin    CBC and Auto Differential    Comprehensive Metabolic Panel          Will obtain recent labs from heme  Reviewed ortho notes  Cont meds  Check labs timing based on recent results  Stable based on symptoms and exam.  Continue established treatment plan and follow up at least yearly.

## 2025-05-01 ENCOUNTER — HOSPITAL ENCOUNTER (OUTPATIENT)
Dept: RADIOLOGY | Facility: CLINIC | Age: 85
Discharge: HOME | End: 2025-05-01
Payer: MEDICARE

## 2025-05-01 DIAGNOSIS — M50.30 OTHER CERVICAL DISC DEGENERATION, UNSPECIFIED CERVICAL REGION: ICD-10-CM

## 2025-05-01 PROCEDURE — 72040 X-RAY EXAM NECK SPINE 2-3 VW: CPT

## 2025-05-06 ENCOUNTER — HOSPITAL ENCOUNTER (OUTPATIENT)
Dept: RADIOLOGY | Facility: CLINIC | Age: 85
Discharge: HOME | End: 2025-05-06
Payer: MEDICARE

## 2025-05-06 DIAGNOSIS — M54.2 NECK PAIN: ICD-10-CM

## 2025-05-06 DIAGNOSIS — R07.9 CHEST PAIN, UNSPECIFIED: ICD-10-CM

## 2025-05-06 PROCEDURE — 72040 X-RAY EXAM NECK SPINE 2-3 VW: CPT

## 2025-05-06 PROCEDURE — 72072 X-RAY EXAM THORAC SPINE 3VWS: CPT

## 2025-05-06 PROCEDURE — 72072 X-RAY EXAM THORAC SPINE 3VWS: CPT | Performed by: RADIOLOGY

## 2025-05-06 PROCEDURE — 72040 X-RAY EXAM NECK SPINE 2-3 VW: CPT | Performed by: RADIOLOGY

## 2025-05-21 ENCOUNTER — APPOINTMENT (OUTPATIENT)
Dept: RADIOLOGY | Facility: CLINIC | Age: 85
End: 2025-05-21
Payer: MEDICARE

## 2025-05-21 DIAGNOSIS — M50.10 CERVICAL DISC DISORDER WITH RADICULOPATHY, UNSPECIFIED CERVICAL REGION: ICD-10-CM

## 2025-05-21 PROCEDURE — 72141 MRI NECK SPINE W/O DYE: CPT

## 2025-06-02 NOTE — PROGRESS NOTES
Patient Name: Evaristo Akers : 1940        Date: 6/3/2025      Type of Appt: Consult    Reason for appt: neck and shoulder pain. MRI done at      Referred by: Dr Merari Luz     Studies done: 25 Xray of Cervical Spine at   25 Xray of Cervical Spine at   25 Xray of Thoracic spine at   25 MRI of Cervical Spine at   ( Imaging pushed to [facility] )    Conservative Treatments (Have you ever tried any)  Physical Therapy in the last 6 months to a year: NO  NSAID's: Yes   Narcotics: No  Muscle relaxants: No  Epidural injections: No    Any Blood Thinners :  [x] Yes  [] No       [x] Aspirin    [] Eliquis     [] Xarelto    [] Pletal   [] Plavix    [] Warfarin        Diabetic:  [] Yes   [x] No   If yes, prescribed insulin: [] Yes   [x]  No      Do you take any : [] Yes   [x]  No      [] Ozempic   [] Wegovy    [] Trulicity    [] Mounjaro     Smoking: [] Yes   [x] No      REVIEW OF SYSTEMS:    [] Rash     [] Difficulty Urinating   [] Nausea    [] Fever      [] Headaches    [x] Bruising/Bleeding Easily    [] Hearing loss     [] Constipation     [] Sleep Disturbance   [] Shortness of breath    [] Diarrhea   [x] Neck Pain    [] Back Pain                         [facility]  [address] [place] [id number]  P: [phone]  F: [phone]          Patient:  Evaristo Akers  YOB: 1940  Date: 6/3/2025    The patient is a 85 y.o. male who presents today for evaluation of the following problems:     No chief complaint on file.       Referred by No ref. provider found      PAST MEDICAL, FAMILY AND SOCIAL HISTORY:  Past Medical History:   Diagnosis Date    GERD (gastroesophageal reflux disease)     Gout     HTN (hypertension)     Hypercholesterolemia     Iron overload     Skin cancer      Past Surgical History:   Procedure Laterality Date    SKIN CANCER EXCISION      TONSILLECTOMY       Family History   Family history unknown: Yes     Current Outpatient

## 2025-06-03 ENCOUNTER — INITIAL CONSULT (OUTPATIENT)
Age: 85
End: 2025-06-03
Payer: MEDICARE

## 2025-06-03 VITALS — WEIGHT: 194 LBS | HEIGHT: 72 IN | BODY MASS INDEX: 26.28 KG/M2 | RESPIRATION RATE: 16 BRPM | TEMPERATURE: 97.5 F

## 2025-06-03 VITALS
HEIGHT: 72 IN | SYSTOLIC BLOOD PRESSURE: 124 MMHG | DIASTOLIC BLOOD PRESSURE: 84 MMHG | HEART RATE: 65 BPM | WEIGHT: 194 LBS | BODY MASS INDEX: 26.28 KG/M2 | TEMPERATURE: 97.5 F

## 2025-06-03 DIAGNOSIS — M54.12 ACUTE CERVICAL RADICULOPATHY: ICD-10-CM

## 2025-06-03 DIAGNOSIS — M50.222 HERNIATED NUCLEUS PULPOSUS, C5-6 RIGHT: ICD-10-CM

## 2025-06-03 DIAGNOSIS — M50.221 HERNIATED NUCLEUS PULPOSUS, C4-5 RIGHT: ICD-10-CM

## 2025-06-03 DIAGNOSIS — Z79.01 CHRONIC ANTICOAGULATION: Primary | ICD-10-CM

## 2025-06-03 DIAGNOSIS — M54.12 CERVICAL RADICULOPATHY AT C5: ICD-10-CM

## 2025-06-03 DIAGNOSIS — S22.009A CLOSED FRACTURE OF THORACIC VERTEBRAL BODY (HCC): Primary | ICD-10-CM

## 2025-06-03 PROCEDURE — G8419 CALC BMI OUT NRM PARAM NOF/U: HCPCS | Performed by: NEUROLOGICAL SURGERY

## 2025-06-03 PROCEDURE — 1036F TOBACCO NON-USER: CPT | Performed by: PAIN MEDICINE

## 2025-06-03 PROCEDURE — 1125F AMNT PAIN NOTED PAIN PRSNT: CPT | Performed by: NEUROLOGICAL SURGERY

## 2025-06-03 PROCEDURE — 99203 OFFICE O/P NEW LOW 30 MIN: CPT | Performed by: NEUROLOGICAL SURGERY

## 2025-06-03 PROCEDURE — 1123F ACP DISCUSS/DSCN MKR DOCD: CPT | Performed by: NEUROLOGICAL SURGERY

## 2025-06-03 PROCEDURE — G8427 DOCREV CUR MEDS BY ELIG CLIN: HCPCS | Performed by: PAIN MEDICINE

## 2025-06-03 PROCEDURE — 1160F RVW MEDS BY RX/DR IN RCRD: CPT | Performed by: PAIN MEDICINE

## 2025-06-03 PROCEDURE — 99204 OFFICE O/P NEW MOD 45 MIN: CPT | Performed by: NEUROLOGICAL SURGERY

## 2025-06-03 PROCEDURE — 1036F TOBACCO NON-USER: CPT | Performed by: NEUROLOGICAL SURGERY

## 2025-06-03 PROCEDURE — G8427 DOCREV CUR MEDS BY ELIG CLIN: HCPCS | Performed by: NEUROLOGICAL SURGERY

## 2025-06-03 PROCEDURE — 1123F ACP DISCUSS/DSCN MKR DOCD: CPT | Performed by: PAIN MEDICINE

## 2025-06-03 PROCEDURE — 99213 OFFICE O/P EST LOW 20 MIN: CPT | Performed by: PAIN MEDICINE

## 2025-06-03 PROCEDURE — 1125F AMNT PAIN NOTED PAIN PRSNT: CPT | Performed by: PAIN MEDICINE

## 2025-06-03 PROCEDURE — 99212 OFFICE O/P EST SF 10 MIN: CPT | Performed by: PAIN MEDICINE

## 2025-06-03 PROCEDURE — 1159F MED LIST DOCD IN RCRD: CPT | Performed by: PAIN MEDICINE

## 2025-06-03 PROCEDURE — G8419 CALC BMI OUT NRM PARAM NOF/U: HCPCS | Performed by: PAIN MEDICINE

## 2025-06-03 PROCEDURE — 1159F MED LIST DOCD IN RCRD: CPT | Performed by: NEUROLOGICAL SURGERY

## 2025-06-03 RX ORDER — TRAMADOL HYDROCHLORIDE 50 MG/1
50 TABLET ORAL EVERY 6 HOURS PRN
Qty: 30 TABLET | Refills: 0 | Status: SHIPPED | OUTPATIENT
Start: 2025-06-03 | End: 2025-07-03

## 2025-06-03 ASSESSMENT — ENCOUNTER SYMPTOMS
SHORTNESS OF BREATH: 0
EYES NEGATIVE: 1
CONSTIPATION: 0
RESPIRATORY NEGATIVE: 1
ALLERGIC/IMMUNOLOGIC NEGATIVE: 1
NAUSEA: 0
EYE PAIN: 0
BACK PAIN: 0
GASTROINTESTINAL NEGATIVE: 1

## 2025-06-03 NOTE — PATIENT INSTRUCTIONS
Welcome to our practice and to our [facility]! Thank you for choosing us as your health care provider.  In the coming days, you may receive a survey about your visit via text or email. Please take a few minutes to answer these   questions. As our patient, we value your opinion and appreciate your feedback about your [facility] Experience.    The Team That Took Care of you Today:    Care Provider(s): Dr Smalls     Associate(s):_____________________________________     We're looking forward to seeing you at your upcoming appointment on     Now you can save time and skip the clipboard! Pre-Registration lets you complete your appointment paperwork and pay your copay directly from your [employer]. Then, when you arrive for your appointment, simply stop at central check in, located on the first floor, and then proceed to the suite. We are located on the first floor in [address], right next to central check in.     We are committed to providing you with exceptional care and look forward to seeing you at your upcoming appointment. If you have any questions or concerns, please do not hesitate to reach out to us.       [facility]

## 2025-06-03 NOTE — PROGRESS NOTES
History of Present Illness     Patient Identification  Evaristo Akers is a 85 y.o. male.    Patient information was obtained from patient.      Chief Complaint   Chief Complaint   Patient presents with    Neck Pain       Patient presents with complaint of Neck pain. This is a result of no known injury. Onset of pain was 2 months ago and has been unchanged since. The pain is located in Neck right side, described as dull and rated as moderate, severe, and 10 / 10, with radiation to T1 distribution Right side. Symptoms include no other symptoms. The patient also Denied complains of fever, dysuria, weight loss, history of cancer, history of osteoporosis, history of steroid use, obesity. The patient denies weakness, numbness, incontinence, dysuria, hematuria. The patient denies other injuries. Care prior to arrival consisted of Heat and Pain meds. with no relief.    Past Medical History:   Diagnosis Date    GERD (gastroesophageal reflux disease)     Gout     HTN (hypertension)     Hypercholesterolemia     Iron overload     Skin cancer      Family History   Family history unknown: Yes     Current Outpatient Medications   Medication Sig Dispense Refill    aspirin 81 MG tablet Take 1 tablet by mouth daily      vitamin B-12 (CYANOCOBALAMIN) 1000 MCG tablet Take 1 tablet by mouth daily      Omega-3 Fatty Acids (FISH OIL) 1000 MG CAPS Take 1 capsule by mouth daily      omeprazole (PRILOSEC) 20 MG delayed release capsule Take 1 capsule by mouth daily      allopurinol (ZYLOPRIM) 300 MG tablet Take 1 tablet by mouth daily      simvastatin (ZOCOR) 10 MG tablet Take 1 tablet by mouth daily      desvenlafaxine succinate (PRISTIQ) 50 MG TB24 extended release tablet Take 1 tablet by mouth daily      lisinopril-hydrochlorothiazide (PRINZIDE;ZESTORETIC) 20-25 MG per tablet Take 1 tablet by mouth daily      fenofibrate (TRICOR) 145 MG tablet Take 1 tablet by mouth daily      meloxicam (MOBIC) 15 MG tablet Take 25 mg by mouth daily

## 2025-06-08 ENCOUNTER — APPOINTMENT (OUTPATIENT)
Dept: RADIOLOGY | Facility: HOSPITAL | Age: 85
End: 2025-06-08
Payer: MEDICARE

## 2025-06-08 ENCOUNTER — APPOINTMENT (OUTPATIENT)
Dept: CARDIOLOGY | Facility: HOSPITAL | Age: 85
End: 2025-06-08
Payer: MEDICARE

## 2025-06-08 ENCOUNTER — HOSPITAL ENCOUNTER (EMERGENCY)
Facility: HOSPITAL | Age: 85
Discharge: LEFT BEFORE DISCHARGE | End: 2025-06-08
Attending: EMERGENCY MEDICINE
Payer: MEDICARE

## 2025-06-08 VITALS
HEIGHT: 70 IN | BODY MASS INDEX: 29.78 KG/M2 | OXYGEN SATURATION: 95 % | WEIGHT: 208 LBS | DIASTOLIC BLOOD PRESSURE: 86 MMHG | HEART RATE: 77 BPM | TEMPERATURE: 97.7 F | SYSTOLIC BLOOD PRESSURE: 199 MMHG | RESPIRATION RATE: 18 BRPM

## 2025-06-08 DIAGNOSIS — R07.9 CHEST PAIN IN ADULT: Primary | ICD-10-CM

## 2025-06-08 LAB
ALBUMIN SERPL BCP-MCNC: 4.9 G/DL (ref 3.4–5)
ALP SERPL-CCNC: 55 U/L (ref 33–136)
ALT SERPL W P-5'-P-CCNC: 29 U/L (ref 10–52)
ANION GAP SERPL CALC-SCNC: 16 MMOL/L (ref 10–20)
AST SERPL W P-5'-P-CCNC: 28 U/L (ref 9–39)
BASOPHILS # BLD AUTO: 0.02 X10*3/UL (ref 0–0.1)
BASOPHILS NFR BLD AUTO: 0.2 %
BILIRUB SERPL-MCNC: 0.5 MG/DL (ref 0–1.2)
BNP SERPL-MCNC: 12 PG/ML (ref 0–99)
BUN SERPL-MCNC: 31 MG/DL (ref 6–23)
CALCIUM SERPL-MCNC: 10.8 MG/DL (ref 8.6–10.3)
CARDIAC TROPONIN I PNL SERPL HS: 12 NG/L (ref 0–20)
CARDIAC TROPONIN I PNL SERPL HS: 14 NG/L (ref 0–20)
CHLORIDE SERPL-SCNC: 99 MMOL/L (ref 98–107)
CO2 SERPL-SCNC: 26 MMOL/L (ref 21–32)
CREAT SERPL-MCNC: 1.79 MG/DL (ref 0.5–1.3)
D DIMER PPP FEU-MCNC: 880 NG/ML FEU
EGFRCR SERPLBLD CKD-EPI 2021: 37 ML/MIN/1.73M*2
EOSINOPHIL # BLD AUTO: 0.15 X10*3/UL (ref 0–0.4)
EOSINOPHIL NFR BLD AUTO: 1.8 %
ERYTHROCYTE [DISTWIDTH] IN BLOOD BY AUTOMATED COUNT: 13.6 % (ref 11.5–14.5)
GLUCOSE SERPL-MCNC: 122 MG/DL (ref 74–99)
HCT VFR BLD AUTO: 43.2 % (ref 41–52)
HGB BLD-MCNC: 14.5 G/DL (ref 13.5–17.5)
IMM GRANULOCYTES # BLD AUTO: 0.03 X10*3/UL (ref 0–0.5)
IMM GRANULOCYTES NFR BLD AUTO: 0.4 % (ref 0–0.9)
LYMPHOCYTES # BLD AUTO: 2.75 X10*3/UL (ref 0.8–3)
LYMPHOCYTES NFR BLD AUTO: 33.8 %
MAGNESIUM SERPL-MCNC: 1.52 MG/DL (ref 1.6–2.4)
MCH RBC QN AUTO: 32 PG (ref 26–34)
MCHC RBC AUTO-ENTMCNC: 33.6 G/DL (ref 32–36)
MCV RBC AUTO: 95 FL (ref 80–100)
MONOCYTES # BLD AUTO: 0.58 X10*3/UL (ref 0.05–0.8)
MONOCYTES NFR BLD AUTO: 7.1 %
NEUTROPHILS # BLD AUTO: 4.61 X10*3/UL (ref 1.6–5.5)
NEUTROPHILS NFR BLD AUTO: 56.7 %
NRBC BLD-RTO: 0 /100 WBCS (ref 0–0)
PLATELET # BLD AUTO: 254 X10*3/UL (ref 150–450)
POTASSIUM SERPL-SCNC: 4.1 MMOL/L (ref 3.5–5.3)
PROT SERPL-MCNC: 7.9 G/DL (ref 6.4–8.2)
RBC # BLD AUTO: 4.53 X10*6/UL (ref 4.5–5.9)
SODIUM SERPL-SCNC: 137 MMOL/L (ref 136–145)
WBC # BLD AUTO: 8.1 X10*3/UL (ref 4.4–11.3)

## 2025-06-08 PROCEDURE — 71045 X-RAY EXAM CHEST 1 VIEW: CPT | Performed by: RADIOLOGY

## 2025-06-08 PROCEDURE — 71045 X-RAY EXAM CHEST 1 VIEW: CPT

## 2025-06-08 PROCEDURE — 84484 ASSAY OF TROPONIN QUANT: CPT

## 2025-06-08 PROCEDURE — 96365 THER/PROPH/DIAG IV INF INIT: CPT | Mod: 59

## 2025-06-08 PROCEDURE — 99285 EMERGENCY DEPT VISIT HI MDM: CPT | Mod: 25 | Performed by: EMERGENCY MEDICINE

## 2025-06-08 PROCEDURE — 93005 ELECTROCARDIOGRAM TRACING: CPT

## 2025-06-08 PROCEDURE — 36415 COLL VENOUS BLD VENIPUNCTURE: CPT

## 2025-06-08 PROCEDURE — 2500000001 HC RX 250 WO HCPCS SELF ADMINISTERED DRUGS (ALT 637 FOR MEDICARE OP)

## 2025-06-08 PROCEDURE — 71275 CT ANGIOGRAPHY CHEST: CPT

## 2025-06-08 PROCEDURE — 2550000001 HC RX 255 CONTRASTS: Performed by: EMERGENCY MEDICINE

## 2025-06-08 PROCEDURE — 80053 COMPREHEN METABOLIC PANEL: CPT

## 2025-06-08 PROCEDURE — 83880 ASSAY OF NATRIURETIC PEPTIDE: CPT

## 2025-06-08 PROCEDURE — 85379 FIBRIN DEGRADATION QUANT: CPT

## 2025-06-08 PROCEDURE — 99285 EMERGENCY DEPT VISIT HI MDM: CPT | Performed by: EMERGENCY MEDICINE

## 2025-06-08 PROCEDURE — 96366 THER/PROPH/DIAG IV INF ADDON: CPT

## 2025-06-08 PROCEDURE — 96375 TX/PRO/DX INJ NEW DRUG ADDON: CPT | Mod: 59

## 2025-06-08 PROCEDURE — 96361 HYDRATE IV INFUSION ADD-ON: CPT

## 2025-06-08 PROCEDURE — 2500000004 HC RX 250 GENERAL PHARMACY W/ HCPCS (ALT 636 FOR OP/ED)

## 2025-06-08 PROCEDURE — 85025 COMPLETE CBC W/AUTO DIFF WBC: CPT

## 2025-06-08 PROCEDURE — 83735 ASSAY OF MAGNESIUM: CPT

## 2025-06-08 PROCEDURE — 71275 CT ANGIOGRAPHY CHEST: CPT | Performed by: RADIOLOGY

## 2025-06-08 RX ORDER — METHOCARBAMOL 500 MG/1
1000 TABLET, FILM COATED ORAL ONCE
Status: COMPLETED | OUTPATIENT
Start: 2025-06-08 | End: 2025-06-08

## 2025-06-08 RX ORDER — NITROGLYCERIN 0.4 MG/1
0.4 TABLET SUBLINGUAL ONCE
Status: COMPLETED | OUTPATIENT
Start: 2025-06-08 | End: 2025-06-08

## 2025-06-08 RX ORDER — MAGNESIUM SULFATE HEPTAHYDRATE 40 MG/ML
2 INJECTION, SOLUTION INTRAVENOUS ONCE
Status: COMPLETED | OUTPATIENT
Start: 2025-06-08 | End: 2025-06-08

## 2025-06-08 RX ORDER — METHOCARBAMOL 500 MG/1
1000 TABLET, FILM COATED ORAL EVERY 8 HOURS SCHEDULED
Status: DISCONTINUED | OUTPATIENT
Start: 2025-06-08 | End: 2025-06-08

## 2025-06-08 RX ORDER — KETOROLAC TROMETHAMINE 15 MG/ML
15 INJECTION, SOLUTION INTRAMUSCULAR; INTRAVENOUS ONCE
Status: COMPLETED | OUTPATIENT
Start: 2025-06-08 | End: 2025-06-08

## 2025-06-08 RX ORDER — MORPHINE SULFATE 4 MG/ML
4 INJECTION, SOLUTION INTRAMUSCULAR; INTRAVENOUS ONCE
Status: DISCONTINUED | OUTPATIENT
Start: 2025-06-08 | End: 2025-06-08

## 2025-06-08 RX ORDER — MORPHINE SULFATE 4 MG/ML
4 INJECTION, SOLUTION INTRAMUSCULAR; INTRAVENOUS ONCE
Status: COMPLETED | OUTPATIENT
Start: 2025-06-08 | End: 2025-06-08

## 2025-06-08 RX ORDER — ONDANSETRON HYDROCHLORIDE 2 MG/ML
4 INJECTION, SOLUTION INTRAVENOUS ONCE
Status: COMPLETED | OUTPATIENT
Start: 2025-06-08 | End: 2025-06-08

## 2025-06-08 RX ADMIN — MAGNESIUM SULFATE HEPTAHYDRATE 2 G: 40 INJECTION, SOLUTION INTRAVENOUS at 17:16

## 2025-06-08 RX ADMIN — NITROGLYCERIN 0.4 MG: 0.4 TABLET SUBLINGUAL at 15:24

## 2025-06-08 RX ADMIN — ONDANSETRON 4 MG: 2 INJECTION INTRAMUSCULAR; INTRAVENOUS at 15:24

## 2025-06-08 RX ADMIN — IOHEXOL 60 ML: 350 INJECTION, SOLUTION INTRAVENOUS at 19:25

## 2025-06-08 RX ADMIN — SODIUM CHLORIDE, SODIUM LACTATE, POTASSIUM CHLORIDE, AND CALCIUM CHLORIDE 1000 ML: .6; .31; .03; .02 INJECTION, SOLUTION INTRAVENOUS at 18:28

## 2025-06-08 RX ADMIN — METHOCARBAMOL 1000 MG: 500 TABLET ORAL at 20:37

## 2025-06-08 RX ADMIN — KETOROLAC TROMETHAMINE 15 MG: 15 INJECTION, SOLUTION INTRAMUSCULAR; INTRAVENOUS at 20:31

## 2025-06-08 RX ADMIN — SODIUM CHLORIDE 1000 ML: 0.9 INJECTION, SOLUTION INTRAVENOUS at 15:22

## 2025-06-08 RX ADMIN — MORPHINE SULFATE 4 MG: 4 INJECTION, SOLUTION INTRAMUSCULAR; INTRAVENOUS at 18:51

## 2025-06-08 ASSESSMENT — LIFESTYLE VARIABLES
HAVE PEOPLE ANNOYED YOU BY CRITICIZING YOUR DRINKING: NO
HAVE YOU EVER FELT YOU SHOULD CUT DOWN ON YOUR DRINKING: NO
EVER FELT BAD OR GUILTY ABOUT YOUR DRINKING: NO
TOTAL SCORE: 0
EVER HAD A DRINK FIRST THING IN THE MORNING TO STEADY YOUR NERVES TO GET RID OF A HANGOVER: NO

## 2025-06-08 ASSESSMENT — PAIN DESCRIPTION - PROGRESSION: CLINICAL_PROGRESSION: NOT CHANGED

## 2025-06-08 ASSESSMENT — PAIN SCALES - GENERAL
PAINLEVEL_OUTOF10: 6
PAINLEVEL_OUTOF10: 9
PAINLEVEL_OUTOF10: 7
PAINLEVEL_OUTOF10: 0 - NO PAIN
PAINLEVEL_OUTOF10: 6

## 2025-06-08 ASSESSMENT — PAIN - FUNCTIONAL ASSESSMENT
PAIN_FUNCTIONAL_ASSESSMENT: 0-10
PAIN_FUNCTIONAL_ASSESSMENT: 0-10

## 2025-06-08 ASSESSMENT — PAIN DESCRIPTION - DESCRIPTORS
DESCRIPTORS: ACHING;SHARP
DESCRIPTORS: ACHING

## 2025-06-08 ASSESSMENT — PAIN DESCRIPTION - LOCATION: LOCATION: CHEST

## 2025-06-08 ASSESSMENT — PAIN DESCRIPTION - PAIN TYPE: TYPE: ACUTE PAIN;CHRONIC PAIN

## 2025-06-08 NOTE — ED PROVIDER NOTES
Emergency Department Provider Note        History of Present Illness     History provided by: Patient  Limitations to History: None  External Records Reviewed with Brief Summary: None    HPI:  Jamar Gill is a 85-year-old male with a history of chronic kidney disease, COPD, GERD, hyperlipidemia, hypertension, right bundle branch block, and hemochromatosis presents to the ED with a complaint of chest pain. The patient reports that his symptoms have been ongoing for approximately two months. He states that the pain started after he slipped on ice, initially beginning in the right hip and radiating up to the right chest. He rates the pain as 8 out of 10 in severity. The patient takes tramadol for pain control, noting that the pain worsens when the medication wears off. He describes the pain as close to sharp and constant. He also reports dizziness as a side effect of tramadol. 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 These findings are thought to contribute to the patient's pain, which reportedly radiates to his chest. The patient denies headache, lightheadedness, chest pain unrelated to this complaint, shortness of breath, abdominal pain, nausea, vomiting, or changes in bowel movements.    Physical Exam   Triage vitals:  T 36.3 °C (97.3 °F)  HR 68  /76  RR 18  O2 98 % None (Room air)    Physical Exam  Vitals and nursing note reviewed.   Constitutional:       General: He is not in acute distress.     Appearance: Normal appearance. He is obese. He is diaphoretic. He is not ill-appearing or toxic-appearing.   HENT:      Head: Normocephalic and atraumatic.      Right Ear: External ear normal.      Left Ear: External ear normal.      Nose: No congestion or rhinorrhea.      Mouth/Throat:      Pharynx: No oropharyngeal exudate or  posterior oropharyngeal erythema.   Eyes:      General:         Right eye: No discharge.         Left eye: No discharge.      Extraocular Movements: Extraocular movements intact.      Conjunctiva/sclera: Conjunctivae normal.      Pupils: Pupils are equal, round, and reactive to light.   Cardiovascular:      Rate and Rhythm: Normal rate and regular rhythm.      Pulses: Normal pulses.      Heart sounds: Normal heart sounds. No murmur heard.     No friction rub. No gallop.   Pulmonary:      Effort: Pulmonary effort is normal. No respiratory distress.      Breath sounds: Normal breath sounds. No stridor. No wheezing, rhonchi or rales.   Chest:      Chest wall: No tenderness.   Abdominal:      General: There is no distension.      Palpations: Abdomen is soft.      Tenderness: There is no guarding or rebound.      Hernia: A hernia (Ventra) is present.   Musculoskeletal:         General: No swelling, tenderness, deformity or signs of injury. Normal range of motion.      Cervical back: Normal range of motion. No rigidity.      Right lower leg: No edema.      Left lower leg: No edema.   Skin:     General: Skin is warm.      Capillary Refill: Capillary refill takes less than 2 seconds.      Coloration: Skin is not jaundiced or pale.      Findings: No bruising, erythema, lesion or rash.   Neurological:      General: No focal deficit present.      Mental Status: He is alert and oriented to person, place, and time. Mental status is at baseline.      Sensory: No sensory deficit.      Motor: No weakness.   Psychiatric:         Mood and Affect: Mood normal.         Thought Content: Thought content normal.        Medical Decision Making & ED Course   Medical Decision Makin y.o. male with multiple comorbidities presenting with chronic right-sided chest and hip pain radiating along the thoracic spine, reportedly related to a recent slip and fall. The differential diagnosis includes ACS, pulmonary embolism, or musculoskeletal  pain. The ED workup included a focused cardiac evaluation with ECG and checks x-ray to rule out acute dysrhythmia, or critical cardiopulmonary process.  Will continue to monitor vital signs to assess for systemic causes. Medical management focused on adequate pain control while monitoring for any worsening symptoms.    A stat EKG showed sinus rhythm with a ventricular rate of 61 bpm, right bundle branch block, a prolonged FL interval of 276 ms, QRS duration of 164 ms, and QTc of 460 ms, with no evidence of ischemic changes.    A comprehensive cardiac workup was initiated, including CBC, CMP, cardiac enzymes, and magnesium levels, to evaluate for anemia, electrolyte abnormalities, or cardiac ischemia. As the patient did not PERC out, a D-dimer was ordered to assess for pulmonary embolism risk. The D-dimer returned elevated at 880. Due to the patient’s underlying renal impairment, Radiology Operations (Rad-Ops) was consulted. Dr. Moore approved proceeding with CT angiography of the chest for PE evaluation and recommended pre- and post-procedure IV hydration to mitigate contrast-related nephrotoxicity.    The case was discussed with ED attending Dr. Ochoa, who saw and evaluated the patient at the bedside. On reassessment, the patient reported symptom improvement after receiving analgesia and a muscle relaxant. Given the nature of his presentation and concern for potential cardiac-related chest pain, the patient was advised to be admitted for further inpatient workup, including a possible stress test based on cardiology evaluation. The patient stated he has a cardiology appointment scheduled for Tuesday and that his orthopedic physician is already managing his pain in the outpatient setting. He demonstrated understanding of the risks of driving after taking a muscle relaxant and confirmed that his son was driving him home.    Despite medical recommendations, the patient elected to leave against medical advice  (AMA). The risks, benefits, and reasons for further evaluation were discussed in detail, and the patient signed the AMA form acknowledging understanding.  ----      Differential diagnoses considered include but are not limited to: ACS, pulmonary embolism, or musculoskeletal pain.      Social Determinants of Health which Significantly Impact Care: None identified     EKG Independent Interpretation: EKG interpreted by myself. Please see ED Course for full interpretation.    Independent Result Review and Interpretation: Relevant laboratory and radiographic results were reviewed and independently interpreted by myself.  As necessary, they are commented on in the ED Course.    Chronic conditions affecting the patient's care: As documented above in [facility]    The patient was discussed with the following consultants/services: Rad-ops, and Dr. Moore gave approval for CT angio chest for PE. He recommended hydrating the patient.    Care Considerations: As documented above in [facility]    ED Course:  ED Course as of 06/08/25 2138   Sun Jun 08, 2025 2113 At 15:26, CBC results returned unremarkable. At 15:33, D-dimer was elevated at 880, prompting further evaluation for pulmonary embolism with CT angiography of the chest. At 15:44, the chemistry panel was resulted, showing acute on chronic kidney injury and hypomagnesemia with a magnesium level of 1.52. At 16:27, all cardiac enzyme results were available; BNP and troponin levels were within normal limits. [CO]   2115 At 16:08, chest X-ray results showed no acute cardiopulmonary process. At 20:07, CT angiography of the chest to evaluate for pulmonary embolism was resulted, showing no evidence of acute pulmonary embolism and no focal airspace disease. [CO]      ED Course User Index  [CO] Olamide Guerra MD         Diagnoses as of 06/08/25 2138   Chest pain in adult     Disposition   The patient elected to leave the Emergency Department against medical advice. In my opinion, the  patient has capacity to leave AMA. he is clinically sober, free from distracting injury, appears to have intact insight, judgment, and reason, and in my opinion has capacity to make decisions. I explained to him that these symptoms may represent a serious underlying medical condition and he verbalized understanding of my concerns and understands the consequences of leaving without complete evaluation.    I had a discussion with the patient about his workup and results, and informed him what the next step in diagnosis and treatment would be, and he verbalized understanding. I explained the risks of leaving without further workup or treatment, which included reasonably foreseeable complications such as death, serious injury, and permanent disability. I also offered alternatives to departing AMA.    I have asked him to return as soon as possible to complete his evaluation, and also explained that he is welcome to return to the ED for further evaluation whenever he chooses. I have asked him to follow up with his primary doctor as soon as possible. All of his questions were answered and he was given oral discharge instructions.    Procedures   Procedures    This was a shared visit with an ED attending.  The patient was seen and discussed with the ED attending Dr. Ochoa.     Olamide Guerra MD  Emergency Medicine, PGY-2     Olamide Guerra MD  Resident  06/08/25 3923

## 2025-06-09 ENCOUNTER — TELEPHONE (OUTPATIENT)
Dept: PRIMARY CARE | Facility: CLINIC | Age: 85
End: 2025-06-09
Payer: MEDICARE

## 2025-06-09 DIAGNOSIS — S29.011D MUSCLE STRAIN OF CHEST WALL, SUBSEQUENT ENCOUNTER: Primary | ICD-10-CM

## 2025-06-09 RX ORDER — METHOCARBAMOL 500 MG/1
500 TABLET, FILM COATED ORAL 4 TIMES DAILY PRN
Qty: 40 TABLET | Refills: 0 | Status: SHIPPED | OUTPATIENT
Start: 2025-06-09 | End: 2025-06-17 | Stop reason: SDUPTHER

## 2025-06-09 NOTE — TELEPHONE ENCOUNTER
Patient called to schedule follow up from ER, he set up for next Tuesday at 430, however inquiring about a medication he received in ER and states it worked very well.  Robaxin 500 mg -2.  Wants to know if that is something you will prescribe?  Please advise

## 2025-06-09 NOTE — DISCHARGE INSTRUCTIONS
"\"You were seen in the Emergency Department for chest pain. There was no critical cause of your chest pain found on your work-up today. Your risk for damage to your heart, aorta (main vessel exiting the heart), or clot in the heart or lungs was low or tested negative for.    Please rest and utilize tylenol as needed for your pain.    Seek immediate medical attention if you develop: new or worsening chest pain, nausea, vomiting, weakness, numbness, tingling, excessive sweating, shortness of breath, difficulty breathing, fainting, fevers 38 C (100.4 F) or higher, loss of motion in your arms or legs, or any new or worsening symptoms.\"  "

## 2025-06-10 ENCOUNTER — APPOINTMENT (OUTPATIENT)
Dept: CARDIOLOGY | Facility: CLINIC | Age: 85
End: 2025-06-10
Payer: MEDICARE

## 2025-06-10 VITALS
BODY MASS INDEX: 26.82 KG/M2 | WEIGHT: 198 LBS | SYSTOLIC BLOOD PRESSURE: 152 MMHG | HEIGHT: 72 IN | HEART RATE: 60 BPM | DIASTOLIC BLOOD PRESSURE: 64 MMHG

## 2025-06-10 DIAGNOSIS — M54.12 CERVICAL RADICULOPATHY: ICD-10-CM

## 2025-06-10 DIAGNOSIS — E78.49 OTHER HYPERLIPIDEMIA: ICD-10-CM

## 2025-06-10 DIAGNOSIS — I10 PRIMARY HYPERTENSION: ICD-10-CM

## 2025-06-10 DIAGNOSIS — R07.89 ATYPICAL CHEST PAIN: Primary | ICD-10-CM

## 2025-06-10 LAB
ATRIAL RATE: 66 BPM
P AXIS: 74 DEGREES
P OFFSET: 145 MS
P ONSET: 91 MS
PR INTERVAL: 274 MS
Q ONSET: 228 MS
QRS COUNT: 11 BEATS
QRS DURATION: 162 MS
QT INTERVAL: 450 MS
QTC CALCULATION(BAZETT): 471 MS
QTC FREDERICIA: 465 MS
R AXIS: -78 DEGREES
T AXIS: 23 DEGREES
T OFFSET: 453 MS
VENTRICULAR RATE: 66 BPM

## 2025-06-10 PROCEDURE — G2211 COMPLEX E/M VISIT ADD ON: HCPCS | Performed by: INTERNAL MEDICINE

## 2025-06-10 PROCEDURE — 1159F MED LIST DOCD IN RCRD: CPT | Performed by: INTERNAL MEDICINE

## 2025-06-10 PROCEDURE — 1160F RVW MEDS BY RX/DR IN RCRD: CPT | Performed by: INTERNAL MEDICINE

## 2025-06-10 PROCEDURE — 3077F SYST BP >= 140 MM HG: CPT | Performed by: INTERNAL MEDICINE

## 2025-06-10 PROCEDURE — 99214 OFFICE O/P EST MOD 30 MIN: CPT | Performed by: INTERNAL MEDICINE

## 2025-06-10 PROCEDURE — 1036F TOBACCO NON-USER: CPT | Performed by: INTERNAL MEDICINE

## 2025-06-10 PROCEDURE — 3078F DIAST BP <80 MM HG: CPT | Performed by: INTERNAL MEDICINE

## 2025-06-10 RX ORDER — TRAMADOL HYDROCHLORIDE 50 MG/1
50 TABLET, FILM COATED ORAL EVERY 6 HOURS PRN
COMMUNITY
Start: 2025-06-03 | End: 2025-07-03

## 2025-06-10 NOTE — ASSESSMENT & PLAN NOTE
Educational materials were provided.  Orders:    Follow Up In Cardiology    Follow Up In Cardiology; Future

## 2025-06-10 NOTE — ASSESSMENT & PLAN NOTE
I reassured the patient that his symptoms of right-sided chest and arm pain are not cardiac in origin.  They are likely related to his cervical radiculopathy and advised him to follow-up with his primary care physician.

## 2025-06-10 NOTE — PROGRESS NOTES
Chief Complaint:   Please see below.     History Of Present Illness:    Jamar Gill is a 85 y.o. male presenting with hypertension, hyperlipidemia.    This hypertensive, hyperlipidemic 85 year old man with hemochromatosis returns to the office in follow-up after a recent visit to the emergency room with symptoms of right-sided chest pain.  His stress echo in February 2021 was negative for ischemia at 4.4 METS.  His cardiac MRI done on May 14, 2024 revealed no evidence of cardiac siderosis, normal biventricular function, LVEF 68%, normal delayed enhancement imaging, and no significant valvular, or pericardial disease.    He went to the ER on 6/8/2025 with an episode of vomiting, diaphnoresis, weakness, with focal sharp pain in his right chest (pointing with one finger) and right arm.  This pain has been constant and unrelenting, and has been there for a couple of days.  On June 8, 2025, his wife called  his son (a retired medic), who called 911.  In the ER he had a variety of testing done, with normal troponins and no ischemic abnormality on EKG.  A CTA disclosed no evidence of PE or pericardial effusion.  He has seen a neurosurgeon who has diagnosed C5-6, C6-7, and T1 disease.       Last Recorded Vitals:  Vitals:    06/10/25 0900   BP: 152/64   BP Location: Left arm   Patient Position: Sitting   Pulse: 60   Weight: 89.8 kg (198 lb)   Height: 1.829 m (6')     Body mass index is 26.85 kg/m².      Past Medical History:  He has a past medical history of Other disorders of iron metabolism (09/30/2021) and Personal history of other endocrine, nutritional and metabolic disease (05/10/2021).    Past Surgical History:  He has a past surgical history that includes Other surgical history (10/17/2019).      Social History:  He reports that he has never smoked. He has never used smokeless tobacco. He reports that he does not currently use alcohol after a past usage of about 12.0 standard drinks of alcohol per week. He  reports that he does not use drugs.    Family History:  Family History   Adopted: Yes        Allergies:  Patient has no known allergies.    Outpatient Medications:  Current Outpatient Medications   Medication Instructions    allopurinol (ZYLOPRIM) 300 mg, oral, Daily    aspirin 81 mg EC tablet 1 tablet, Daily    cholecalciferol (Vitamin D-3) 5,000 Units tablet 1 tablet, Daily    choline fenofibrate (TRILIPIX) 135 mg, oral, Daily    cyanocobalamin (Vitamin B-12) 2,500 mcg tablet Take by mouth.    fish oil concentrate (Omega-3) 120-180 mg capsule Take by mouth.    glucosamine/chondr maurer A sod (OSTEO BI-FLEX ORAL) Take 2 tabs po every day.    lidocaine (Lidoderm) 5 % patch 1 patch, transdermal, Daily, Apply to painful area 12 hours per day, remove for 12 hours.    lisinopriL-hydrochlorothiazide 20-25 mg tablet 1.5 tablets, oral, Daily    meloxicam (MOBIC) 15 mg, oral, Daily PRN    methocarbamol (ROBAXIN) 500 mg, oral, 4 times daily PRN    omeprazole (PRILOSEC) 20 mg, oral, Daily    simvastatin (ZOCOR) 10 mg, oral, Daily    traMADol (ULTRAM) 50 mg, Every 6 hours PRN    valACYclovir (Valtrex) 1 gram tablet 1 tablet, Daily PRN    venlafaxine XR (EFFEXOR-XR) 150 mg, oral, Daily       Physical Exam:  GENERAL:  pleasant 85 year-old  HEENT: No xanthelasma  NECK: Supple, no palpable adenopathy or thyromegaly  CHEST: Clear to auscultation, respiratory effort unlabored  CARDIAC: RRR, normal S1 and S2, no audible murmur, rub, gallop, carotids are brisk, PMI is not displaced  ABD: Active bowel sounds, nontender, no organomegaly, no evidence of ascites  EXT: No clubbing, cyanosis, edema, or tenderness  NEURO: Awake, alert, appropriate, speech is fluent         Last Labs:  CBC -  Lab Results   Component Value Date    WBC 8.1 06/08/2025    HGB 14.5 06/08/2025    HCT 43.2 06/08/2025    MCV 95 06/08/2025     06/08/2025       CMP -  Lab Results   Component Value Date    CALCIUM 10.8 (H) 06/08/2025    PROT 7.9 06/08/2025    ALBUMIN  4.9 06/08/2025    AST 28 06/08/2025    ALT 29 06/08/2025    ALKPHOS 55 06/08/2025    BILITOT 0.5 06/08/2025       LIPID PANEL -   Lab Results   Component Value Date    CHOL 211 (H) 09/25/2024    TRIG 527 (H) 09/25/2024    HDL 45.7 09/25/2024    CHHDL 4.6 09/25/2024    LDLF 85 09/20/2023    VLDL  09/25/2024      Comment:      Unable to calculate VLDL.    NHDL 165 (H) 09/25/2024       RENAL FUNCTION PANEL -   Lab Results   Component Value Date    GLUCOSE 122 (H) 06/08/2025     06/08/2025    K 4.1 06/08/2025    CL 99 06/08/2025    CO2 26 06/08/2025    ANIONGAP 16 06/08/2025    BUN 31 (H) 06/08/2025    CREATININE 1.79 (H) 06/08/2025    GFRMALE 43 (A) 09/20/2023    CALCIUM 10.8 (H) 06/08/2025    ALBUMIN 4.9 06/08/2025        Lab Results   Component Value Date    BNP 12 06/08/2025    HGBA1C 5.4 09/01/2022       Lab review: I have Chemistry CMP:   Lab Results   Component Value Date    ALBUMIN 4.9 06/08/2025    CALCIUM 10.8 (H) 06/08/2025    CO2 26 06/08/2025    CREATININE 1.79 (H) 06/08/2025    GLUCOSE 122 (H) 06/08/2025    BILITOT 0.5 06/08/2025    PROT 7.9 06/08/2025    ALT 29 06/08/2025    AST 28 06/08/2025    ALKPHOS 55 06/08/2025   , Chemistry BMP   Lab Results   Component Value Date    GLUCOSE 122 (H) 06/08/2025    CALCIUM 10.8 (H) 06/08/2025    CO2 26 06/08/2025    CREATININE 1.79 (H) 06/08/2025   , CBC:  Lab Results   Component Value Date    WBC 8.1 06/08/2025    RBC 4.53 06/08/2025    HGB 14.5 06/08/2025    HCT 43.2 06/08/2025    MCV 95 06/08/2025    MCH 32.0 06/08/2025    MCHC 33.6 06/08/2025    RDW 13.6 06/08/2025    NRBC 0.0 06/08/2025   , and Lipids:   Lab Results   Component Value Date    CHOL 211 (H) 09/25/2024    HDL 45.7 09/25/2024    LDLCALC  09/25/2024      Comment:      The calculation of LDL and VLDL are inaccurate when the Triglycerides are greater than 400 mg/dL or when the patient is non-fasting. If LDL measurement is necessary contact the testing laboratory for an alternative LDL  assay.                                  Near   Borderline      AGE      Desirable  Optimal    High     High     Very High     0-19 Y     0 - 109     ---    110-129   >/= 130     ----    20-24 Y     0 - 119     ---    120-159   >/= 160     ----      >24 Y     0 -  99   100-129  130-159   160-189     >/=190      TRIG 527 (H) 09/25/2024     Imaging review: I have  personally reviewed the result(s) thoracic CT scan      Assessment/Plan   Assessment & Plan  Primary hypertension  HTN: BP is not well controlled.   We discussed sodium restriction, lifestyle modification, and the DASH diet.  I advised the patient to log blood pressures daily, and to bring the data to the next appointment.  If home BPs are also high, will need to add, or adjust medications.  Very likely, his acute pain in his impacting his blood pressure.    Educational materials were provided.  Orders:    Follow Up In Cardiology    Follow Up In Cardiology; Future    Other hyperlipidemia  Educational materials were provided.  Orders:    Follow Up In Cardiology    Follow Up In Cardiology; Future    Atypical chest pain  I reassured the patient that his symptoms of right-sided chest and arm pain are not cardiac in origin.  They are likely related to his cervical radiculopathy and advised him to follow-up with his primary care physician.       Cervical radiculopathy         BMI 26.0-26.9,adult  Risk factor modification: educational materials were provided to the patient.          Important aspects of primary prevention were discussed.          MD Humberto Nicole MD

## 2025-06-10 NOTE — ASSESSMENT & PLAN NOTE
HTN: BP is not well controlled.   We discussed sodium restriction, lifestyle modification, and the DASH diet.  I advised the patient to log blood pressures daily, and to bring the data to the next appointment.  If home BPs are also high, will need to add, or adjust medications.  Very likely, his acute pain in his impacting his blood pressure.    Educational materials were provided.  Orders:    Follow Up In Cardiology    Follow Up In Cardiology; Future

## 2025-06-14 DIAGNOSIS — M15.0 PRIMARY OSTEOARTHRITIS INVOLVING MULTIPLE JOINTS: ICD-10-CM

## 2025-06-16 RX ORDER — MELOXICAM 15 MG/1
15 TABLET ORAL DAILY PRN
Qty: 30 TABLET | Refills: 6 | Status: SHIPPED | OUTPATIENT
Start: 2025-06-16 | End: 2026-06-16

## 2025-06-17 ENCOUNTER — APPOINTMENT (OUTPATIENT)
Dept: PRIMARY CARE | Facility: CLINIC | Age: 85
End: 2025-06-17
Payer: MEDICARE

## 2025-06-17 VITALS
HEIGHT: 72 IN | DIASTOLIC BLOOD PRESSURE: 68 MMHG | BODY MASS INDEX: 26.01 KG/M2 | SYSTOLIC BLOOD PRESSURE: 116 MMHG | RESPIRATION RATE: 16 BRPM | OXYGEN SATURATION: 98 % | WEIGHT: 192 LBS | TEMPERATURE: 97.9 F | HEART RATE: 76 BPM

## 2025-06-17 DIAGNOSIS — F32.1 DEPRESSION, MAJOR, SINGLE EPISODE, MODERATE (MULTI): Primary | ICD-10-CM

## 2025-06-17 DIAGNOSIS — S29.011D MUSCLE STRAIN OF CHEST WALL, SUBSEQUENT ENCOUNTER: ICD-10-CM

## 2025-06-17 PROCEDURE — 1125F AMNT PAIN NOTED PAIN PRSNT: CPT | Performed by: INTERNAL MEDICINE

## 2025-06-17 PROCEDURE — 1160F RVW MEDS BY RX/DR IN RCRD: CPT | Performed by: INTERNAL MEDICINE

## 2025-06-17 PROCEDURE — 99214 OFFICE O/P EST MOD 30 MIN: CPT | Performed by: INTERNAL MEDICINE

## 2025-06-17 PROCEDURE — 3074F SYST BP LT 130 MM HG: CPT | Performed by: INTERNAL MEDICINE

## 2025-06-17 PROCEDURE — 3078F DIAST BP <80 MM HG: CPT | Performed by: INTERNAL MEDICINE

## 2025-06-17 PROCEDURE — G2211 COMPLEX E/M VISIT ADD ON: HCPCS | Performed by: INTERNAL MEDICINE

## 2025-06-17 PROCEDURE — 1159F MED LIST DOCD IN RCRD: CPT | Performed by: INTERNAL MEDICINE

## 2025-06-17 PROCEDURE — 1158F ADVNC CARE PLAN TLK DOCD: CPT | Performed by: INTERNAL MEDICINE

## 2025-06-17 RX ORDER — VENLAFAXINE HYDROCHLORIDE 75 MG/1
75 CAPSULE, EXTENDED RELEASE ORAL DAILY
Qty: 15 CAPSULE | Refills: 0 | Status: SHIPPED | OUTPATIENT
Start: 2025-06-17 | End: 2025-07-02

## 2025-06-17 RX ORDER — METHOCARBAMOL 500 MG/1
500 TABLET, FILM COATED ORAL 4 TIMES DAILY PRN
Qty: 40 TABLET | Refills: 2 | Status: SHIPPED | OUTPATIENT
Start: 2025-06-17 | End: 2025-08-16

## 2025-06-17 ASSESSMENT — PAIN SCALES - GENERAL: PAINLEVEL_OUTOF10: 3

## 2025-06-17 NOTE — PROGRESS NOTES
Subjective   Jamar Gill is a 85 y.o. male who presents for Hospital Follow-up.    HPI   [facility] ED eval 6/8/25  Dx: Chest pain in adult  Labs, CXR, CTA chest, EKG  Med changes: None  Follow up: PCP, Cardiology  Pain has improved.  Following up Ortho, Neurosurgeon, Pain Management.  Starting PT.    Review of Systems   Constitutional:  Negative for fatigue and fever.   Respiratory:  Negative for cough and shortness of breath.    Cardiovascular:  Negative for chest pain and leg swelling.   All other systems reviewed and are negative.      Health Maintenance Due   Topic Date Due    Derm Melanoma Skin Check  Never done    Hepatitis A Vaccines (1 of 2 - Risk 2-dose series) Never done    CKD: Urine Protein Screening  Never done    Hepatitis B Vaccines (1 of 3 - Risk 3-dose series) Never done    RSV High Risk: (Elderly (60+) or Pregnant Population) (1 - 1-dose 75+ series) Never done    COVID-19 Vaccine (4 - 2024-25 season) 09/01/2024       Objective   /68   Pulse 76   Temp 36.6 °C (97.9 °F)   Resp 16   Ht 1.829 m (6')   Wt 87.1 kg (192 lb)   SpO2 98%   BMI 26.04 kg/m²     Physical Exam  Vitals and nursing note reviewed.   Constitutional:       Appearance: Normal appearance.   HENT:      Head: Normocephalic.   Eyes:      Conjunctiva/sclera: Conjunctivae normal.      Pupils: Pupils are equal, round, and reactive to light.   Cardiovascular:      Rate and Rhythm: Normal rate and regular rhythm.      Pulses: Normal pulses.      Heart sounds: Normal heart sounds.   Pulmonary:      Effort: Pulmonary effort is normal.      Breath sounds: Normal breath sounds.   Musculoskeletal:         General: No swelling.      Cervical back: Neck supple.   Skin:     General: Skin is warm and dry.   Neurological:      General: No focal deficit present.      Mental Status: He is oriented to person, place, and time.         Assessment/Plan   Problem List Items Addressed This Visit       Depression, major, single episode, moderate  (Multi) - Primary    Relevant Medications    venlafaxine XR (Effexor-XR) 75 mg 24 hr capsule     Other Visit Diagnoses         Muscle strain of chest wall, subsequent encounter        Relevant Medications    methocarbamol (Robaxin) 500 mg tablet          Will resume effexor slowly  Cont robaxin  Call if any problems  Fu with pain mgmt  Call if not feeling better  Stable based on symptoms and exam.  Continue established treatment plan and follow up at least yearly.  Reviewed all records

## 2025-06-18 ASSESSMENT — ENCOUNTER SYMPTOMS
COUGH: 0
SHORTNESS OF BREATH: 0
FEVER: 0
FATIGUE: 0

## 2025-06-18 NOTE — PROGRESS NOTES
Patient Name: Evaristo Akers : 1940        Date: 2025      Type of Appt: Follow up    Reason for appt: Follow up after Pain management and Therapay      Pt last seen by   Dr. Smalls on 6/3/2025    Studies done: No New Studies    Conservative Treatments (Have you ever tried any)  Physical Therapy in the last 6 months to a year:   NSAID's: Yes  Narcotics: Yes  Muscle relaxants: No  Epidural injections: No    Any Blood Thinners: [x] Yes   []  No        [x] Aspirin   [] Eliquis   [] Xarelto   [] Pletal   [] Plavix    [] Warfarin        Diabetic:  [] Yes   [x] No   If yes, prescribed insulin:  [] Yes   [x]  No     Weight loss medications:   [] Yes  [x] No     [] Ozempic   [] Wegovy    [] Trulicity    [] Mounjaro         Smoking : [] Yes   [x]  No     The patient is a 85 y.o. male who presents today for evaluation of the following problems:      Chief complaint neck pain        PAST MEDICAL, FAMILY AND SOCIAL HISTORY:  Past Medical History        Past Medical History:   Diagnosis Date    GERD (gastroesophageal reflux disease)      Gout      HTN (hypertension)      Hypercholesterolemia      Iron overload      Skin cancer           Past Surgical History         Past Surgical History:   Procedure Laterality Date    SKIN CANCER EXCISION        TONSILLECTOMY             Family History   Family History   Family history unknown: Yes         Medications Ordered Prior to Encounter          Current Outpatient Medications on File Prior to Visit   Medication Sig Dispense Refill    aspirin 81 MG tablet Take 1 tablet by mouth daily        vitamin B-12 (CYANOCOBALAMIN) 1000 MCG tablet Take 1 tablet by mouth daily        Omega-3 Fatty Acids (FISH OIL) 1000 MG CAPS Take 1 capsule by mouth daily        omeprazole (PRILOSEC) 20 MG delayed release capsule Take 1 capsule by mouth daily        allopurinol (ZYLOPRIM) 300 MG tablet Take 1 tablet by mouth daily        simvastatin (ZOCOR) 10 MG tablet Take 1 tablet by mouth daily

## 2025-06-21 LAB
ALBUMIN SERPL-MCNC: 4.4 G/DL (ref 3.6–5.1)
ALP SERPL-CCNC: 47 U/L (ref 35–144)
ALT SERPL-CCNC: 13 U/L (ref 9–46)
ANION GAP SERPL CALCULATED.4IONS-SCNC: 10 MMOL/L (CALC) (ref 7–17)
AST SERPL-CCNC: 14 U/L (ref 10–35)
BASOPHILS # BLD AUTO: 43 CELLS/UL (ref 0–200)
BASOPHILS NFR BLD AUTO: 0.6 %
BILIRUB SERPL-MCNC: 0.4 MG/DL (ref 0.2–1.2)
BUN SERPL-MCNC: 48 MG/DL (ref 7–25)
CALCIUM SERPL-MCNC: 9.9 MG/DL (ref 8.6–10.3)
CHLORIDE SERPL-SCNC: 105 MMOL/L (ref 98–110)
CO2 SERPL-SCNC: 24 MMOL/L (ref 20–32)
CREAT SERPL-MCNC: 1.92 MG/DL (ref 0.7–1.22)
EGFRCR SERPLBLD CKD-EPI 2021: 34 ML/MIN/1.73M2
EOSINOPHIL # BLD AUTO: 86 CELLS/UL (ref 15–500)
EOSINOPHIL NFR BLD AUTO: 1.2 %
ERYTHROCYTE [DISTWIDTH] IN BLOOD BY AUTOMATED COUNT: 13.1 % (ref 11–15)
FERRITIN SERPL-MCNC: 431 NG/ML (ref 24–380)
FOLATE SERPL-MCNC: 7.7 NG/ML
GLUCOSE SERPL-MCNC: 150 MG/DL (ref 65–99)
HCT VFR BLD AUTO: 39.7 % (ref 38.5–50)
HGB BLD-MCNC: 12.8 G/DL (ref 13.2–17.1)
IRON SATN MFR SERPL: 28 % (CALC) (ref 20–48)
IRON SERPL-MCNC: 125 MCG/DL (ref 50–180)
LYMPHOCYTES # BLD AUTO: 2549 CELLS/UL (ref 850–3900)
LYMPHOCYTES NFR BLD AUTO: 35.4 %
MCH RBC QN AUTO: 31.3 PG (ref 27–33)
MCHC RBC AUTO-ENTMCNC: 32.2 G/DL (ref 32–36)
MCV RBC AUTO: 97.1 FL (ref 80–100)
MONOCYTES # BLD AUTO: 533 CELLS/UL (ref 200–950)
MONOCYTES NFR BLD AUTO: 7.4 %
NEUTROPHILS # BLD AUTO: 3989 CELLS/UL (ref 1500–7800)
NEUTROPHILS NFR BLD AUTO: 55.4 %
PLATELET # BLD AUTO: 207 THOUSAND/UL (ref 140–400)
PMV BLD REES-ECKER: 11 FL (ref 7.5–12.5)
POTASSIUM SERPL-SCNC: 4.3 MMOL/L (ref 3.5–5.3)
PROT SERPL-MCNC: 6.7 G/DL (ref 6.1–8.1)
RBC # BLD AUTO: 4.09 MILLION/UL (ref 4.2–5.8)
SODIUM SERPL-SCNC: 139 MMOL/L (ref 135–146)
TIBC SERPL-MCNC: 444 MCG/DL (CALC) (ref 250–425)
VIT B12 SERPL-MCNC: 866 PG/ML (ref 200–1100)
WBC # BLD AUTO: 7.2 THOUSAND/UL (ref 3.8–10.8)

## 2025-06-26 ENCOUNTER — TELEPHONE (OUTPATIENT)
Dept: PRIMARY CARE | Facility: CLINIC | Age: 85
End: 2025-06-26
Payer: MEDICARE

## 2025-06-26 NOTE — TELEPHONE ENCOUNTER
----- Message from Brenda Bains sent at 6/26/2025  3:11 PM EDT -----  Call patient and son   His labs look good and stable  A little bit dehydrated but kidneys are stable    ----- Message -----  From: [email or web address] [email or web address] Results In  Sent: 6/20/2025  11:07 PM EDT  To: Brenda Bains, DO

## 2025-07-01 ENCOUNTER — APPOINTMENT (OUTPATIENT)
Dept: RADIOLOGY | Facility: HOSPITAL | Age: 85
DRG: 270 | End: 2025-07-01
Payer: MEDICARE

## 2025-07-01 ENCOUNTER — HOSPITAL ENCOUNTER (INPATIENT)
Facility: HOSPITAL | Age: 85
End: 2025-07-01
Attending: EMERGENCY MEDICINE | Admitting: STUDENT IN AN ORGANIZED HEALTH CARE EDUCATION/TRAINING PROGRAM
Payer: MEDICARE

## 2025-07-01 ENCOUNTER — APPOINTMENT (OUTPATIENT)
Dept: CARDIOLOGY | Facility: HOSPITAL | Age: 85
DRG: 270 | End: 2025-07-01
Payer: MEDICARE

## 2025-07-01 ENCOUNTER — OFFICE VISIT (OUTPATIENT)
Age: 85
End: 2025-07-01
Payer: MEDICARE

## 2025-07-01 ENCOUNTER — TELEPHONE (OUTPATIENT)
Dept: PRIMARY CARE | Facility: CLINIC | Age: 85
End: 2025-07-01
Payer: MEDICARE

## 2025-07-01 VITALS
DIASTOLIC BLOOD PRESSURE: 72 MMHG | SYSTOLIC BLOOD PRESSURE: 136 MMHG | HEIGHT: 72 IN | WEIGHT: 194 LBS | BODY MASS INDEX: 26.28 KG/M2

## 2025-07-01 VITALS
DIASTOLIC BLOOD PRESSURE: 60 MMHG | SYSTOLIC BLOOD PRESSURE: 110 MMHG | OXYGEN SATURATION: 99 % | HEART RATE: 70 BPM | WEIGHT: 194 LBS | BODY MASS INDEX: 26.28 KG/M2 | TEMPERATURE: 97.9 F | HEIGHT: 72 IN

## 2025-07-01 DIAGNOSIS — S22.009A CLOSED FRACTURE OF THORACIC VERTEBRAL BODY (HCC): ICD-10-CM

## 2025-07-01 DIAGNOSIS — I99.8 ACUTE LOWER LIMB ISCHEMIA: Primary | ICD-10-CM

## 2025-07-01 DIAGNOSIS — Z79.01 CHRONIC ANTICOAGULATION: Primary | ICD-10-CM

## 2025-07-01 DIAGNOSIS — R07.89 ATYPICAL CHEST PAIN: ICD-10-CM

## 2025-07-01 DIAGNOSIS — R55 SYNCOPE AND COLLAPSE: ICD-10-CM

## 2025-07-01 DIAGNOSIS — M79.662 PAIN OF LEFT CALF: ICD-10-CM

## 2025-07-01 DIAGNOSIS — M54.12 CERVICAL RADICULOPATHY AT C5: ICD-10-CM

## 2025-07-01 DIAGNOSIS — I70.222 CRITICAL LIMB ISCHEMIA OF LEFT LOWER EXTREMITY: ICD-10-CM

## 2025-07-01 LAB
ABO GROUP (TYPE) IN BLOOD: NORMAL
ALBUMIN SERPL BCP-MCNC: 4.5 G/DL (ref 3.4–5)
ALP SERPL-CCNC: 43 U/L (ref 33–136)
ALT SERPL W P-5'-P-CCNC: 21 U/L (ref 10–52)
ANION GAP SERPL CALC-SCNC: 16 MMOL/L (ref 10–20)
ANTIBODY SCREEN: NORMAL
APTT PPP: 27 SECONDS (ref 26–36)
AST SERPL W P-5'-P-CCNC: 27 U/L (ref 9–39)
BASOPHILS # BLD AUTO: 0.02 X10*3/UL (ref 0–0.1)
BASOPHILS NFR BLD AUTO: 0.2 %
BILIRUB SERPL-MCNC: 0.5 MG/DL (ref 0–1.2)
BUN SERPL-MCNC: 36 MG/DL (ref 6–23)
CALCIUM SERPL-MCNC: 9.6 MG/DL (ref 8.6–10.3)
CHLORIDE SERPL-SCNC: 103 MMOL/L (ref 98–107)
CO2 SERPL-SCNC: 24 MMOL/L (ref 21–32)
CREAT SERPL-MCNC: 1.55 MG/DL (ref 0.5–1.3)
EGFRCR SERPLBLD CKD-EPI 2021: 44 ML/MIN/1.73M*2
EOSINOPHIL # BLD AUTO: 0.14 X10*3/UL (ref 0–0.4)
EOSINOPHIL NFR BLD AUTO: 1.5 %
ERYTHROCYTE [DISTWIDTH] IN BLOOD BY AUTOMATED COUNT: 13.5 % (ref 11.5–14.5)
GLUCOSE SERPL-MCNC: 100 MG/DL (ref 74–99)
HCT VFR BLD AUTO: 36.9 % (ref 41–52)
HGB BLD-MCNC: 12.6 G/DL (ref 13.5–17.5)
IMM GRANULOCYTES # BLD AUTO: 0.02 X10*3/UL (ref 0–0.5)
IMM GRANULOCYTES NFR BLD AUTO: 0.2 % (ref 0–0.9)
INR PPP: 1 (ref 0.9–1.1)
LYMPHOCYTES # BLD AUTO: 2.64 X10*3/UL (ref 0.8–3)
LYMPHOCYTES NFR BLD AUTO: 27.8 %
MCH RBC QN AUTO: 31.9 PG (ref 26–34)
MCHC RBC AUTO-ENTMCNC: 34.1 G/DL (ref 32–36)
MCV RBC AUTO: 93 FL (ref 80–100)
MONOCYTES # BLD AUTO: 0.59 X10*3/UL (ref 0.05–0.8)
MONOCYTES NFR BLD AUTO: 6.2 %
NEUTROPHILS # BLD AUTO: 6.07 X10*3/UL (ref 1.6–5.5)
NEUTROPHILS NFR BLD AUTO: 64.1 %
NRBC BLD-RTO: 0 /100 WBCS (ref 0–0)
PLATELET # BLD AUTO: 189 X10*3/UL (ref 150–450)
POTASSIUM SERPL-SCNC: 3.9 MMOL/L (ref 3.5–5.3)
PROT SERPL-MCNC: 7 G/DL (ref 6.4–8.2)
PROTHROMBIN TIME: 11 SECONDS (ref 9.8–12.4)
RBC # BLD AUTO: 3.95 X10*6/UL (ref 4.5–5.9)
RH FACTOR (ANTIGEN D): NORMAL
SODIUM SERPL-SCNC: 139 MMOL/L (ref 136–145)
UFH PPP CHRO-ACNC: 0.6 IU/ML (ref ?–1.1)
UFH PPP CHRO-ACNC: 1.1 IU/ML (ref ?–1.1)
UFH PPP CHRO-ACNC: 1.6 IU/ML (ref ?–1.1)
WBC # BLD AUTO: 9.5 X10*3/UL (ref 4.4–11.3)

## 2025-07-01 PROCEDURE — 99213 OFFICE O/P EST LOW 20 MIN: CPT | Performed by: NEUROLOGICAL SURGERY

## 2025-07-01 PROCEDURE — 1123F ACP DISCUSS/DSCN MKR DOCD: CPT | Performed by: PAIN MEDICINE

## 2025-07-01 PROCEDURE — 73706 CT ANGIO LWR EXTR W/O&W/DYE: CPT | Mod: LEFT SIDE | Performed by: RADIOLOGY

## 2025-07-01 PROCEDURE — 2500000001 HC RX 250 WO HCPCS SELF ADMINISTERED DRUGS (ALT 637 FOR MEDICARE OP): Performed by: STUDENT IN AN ORGANIZED HEALTH CARE EDUCATION/TRAINING PROGRAM

## 2025-07-01 PROCEDURE — G8419 CALC BMI OUT NRM PARAM NOF/U: HCPCS | Performed by: PAIN MEDICINE

## 2025-07-01 PROCEDURE — 86900 BLOOD TYPING SEROLOGIC ABO: CPT | Performed by: PHYSICIAN ASSISTANT

## 2025-07-01 PROCEDURE — 1125F AMNT PAIN NOTED PAIN PRSNT: CPT | Performed by: PAIN MEDICINE

## 2025-07-01 PROCEDURE — 1159F MED LIST DOCD IN RCRD: CPT | Performed by: NEUROLOGICAL SURGERY

## 2025-07-01 PROCEDURE — 85730 THROMBOPLASTIN TIME PARTIAL: CPT | Performed by: PHYSICIAN ASSISTANT

## 2025-07-01 PROCEDURE — 99212 OFFICE O/P EST SF 10 MIN: CPT | Performed by: NEUROLOGICAL SURGERY

## 2025-07-01 PROCEDURE — 99222 1ST HOSP IP/OBS MODERATE 55: CPT | Performed by: STUDENT IN AN ORGANIZED HEALTH CARE EDUCATION/TRAINING PROGRAM

## 2025-07-01 PROCEDURE — 93005 ELECTROCARDIOGRAM TRACING: CPT

## 2025-07-01 PROCEDURE — G8427 DOCREV CUR MEDS BY ELIG CLIN: HCPCS | Performed by: NEUROLOGICAL SURGERY

## 2025-07-01 PROCEDURE — 1125F AMNT PAIN NOTED PAIN PRSNT: CPT | Performed by: NEUROLOGICAL SURGERY

## 2025-07-01 PROCEDURE — 73706 CT ANGIO LWR EXTR W/O&W/DYE: CPT | Mod: LT

## 2025-07-01 PROCEDURE — 96376 TX/PRO/DX INJ SAME DRUG ADON: CPT

## 2025-07-01 PROCEDURE — 36415 COLL VENOUS BLD VENIPUNCTURE: CPT | Performed by: EMERGENCY MEDICINE

## 2025-07-01 PROCEDURE — 2550000001 HC RX 255 CONTRASTS: Performed by: EMERGENCY MEDICINE

## 2025-07-01 PROCEDURE — 1100000001 HC PRIVATE ROOM DAILY

## 2025-07-01 PROCEDURE — 85025 COMPLETE CBC W/AUTO DIFF WBC: CPT | Performed by: PHYSICIAN ASSISTANT

## 2025-07-01 PROCEDURE — 99285 EMERGENCY DEPT VISIT HI MDM: CPT | Performed by: EMERGENCY MEDICINE

## 2025-07-01 PROCEDURE — 96366 THER/PROPH/DIAG IV INF ADDON: CPT

## 2025-07-01 PROCEDURE — G8427 DOCREV CUR MEDS BY ELIG CLIN: HCPCS | Performed by: PAIN MEDICINE

## 2025-07-01 PROCEDURE — G8419 CALC BMI OUT NRM PARAM NOF/U: HCPCS | Performed by: NEUROLOGICAL SURGERY

## 2025-07-01 PROCEDURE — 1036F TOBACCO NON-USER: CPT | Performed by: PAIN MEDICINE

## 2025-07-01 PROCEDURE — 99223 1ST HOSP IP/OBS HIGH 75: CPT | Performed by: STUDENT IN AN ORGANIZED HEALTH CARE EDUCATION/TRAINING PROGRAM

## 2025-07-01 PROCEDURE — 80053 COMPREHEN METABOLIC PANEL: CPT | Performed by: PHYSICIAN ASSISTANT

## 2025-07-01 PROCEDURE — 99212 OFFICE O/P EST SF 10 MIN: CPT | Performed by: PAIN MEDICINE

## 2025-07-01 PROCEDURE — 86923 COMPATIBILITY TEST ELECTRIC: CPT

## 2025-07-01 PROCEDURE — 99213 OFFICE O/P EST LOW 20 MIN: CPT | Performed by: PAIN MEDICINE

## 2025-07-01 PROCEDURE — 96365 THER/PROPH/DIAG IV INF INIT: CPT

## 2025-07-01 PROCEDURE — 1036F TOBACCO NON-USER: CPT | Performed by: NEUROLOGICAL SURGERY

## 2025-07-01 PROCEDURE — 2500000004 HC RX 250 GENERAL PHARMACY W/ HCPCS (ALT 636 FOR OP/ED): Performed by: PHYSICIAN ASSISTANT

## 2025-07-01 PROCEDURE — 2500000001 HC RX 250 WO HCPCS SELF ADMINISTERED DRUGS (ALT 637 FOR MEDICARE OP): Performed by: PHYSICIAN ASSISTANT

## 2025-07-01 PROCEDURE — 1159F MED LIST DOCD IN RCRD: CPT | Performed by: PAIN MEDICINE

## 2025-07-01 PROCEDURE — 85610 PROTHROMBIN TIME: CPT | Performed by: PHYSICIAN ASSISTANT

## 2025-07-01 PROCEDURE — 1123F ACP DISCUSS/DSCN MKR DOCD: CPT | Performed by: NEUROLOGICAL SURGERY

## 2025-07-01 PROCEDURE — 85520 HEPARIN ASSAY: CPT | Performed by: STUDENT IN AN ORGANIZED HEALTH CARE EDUCATION/TRAINING PROGRAM

## 2025-07-01 PROCEDURE — 85520 HEPARIN ASSAY: CPT | Performed by: EMERGENCY MEDICINE

## 2025-07-01 PROCEDURE — 36415 COLL VENOUS BLD VENIPUNCTURE: CPT | Performed by: PHYSICIAN ASSISTANT

## 2025-07-01 PROCEDURE — 1160F RVW MEDS BY RX/DR IN RCRD: CPT | Performed by: PAIN MEDICINE

## 2025-07-01 PROCEDURE — 99285 EMERGENCY DEPT VISIT HI MDM: CPT | Performed by: PHYSICIAN ASSISTANT

## 2025-07-01 RX ORDER — OXYCODONE AND ACETAMINOPHEN 5; 325 MG/1; MG/1
1 TABLET ORAL ONCE
Refills: 0 | Status: COMPLETED | OUTPATIENT
Start: 2025-07-01 | End: 2025-07-01

## 2025-07-01 RX ORDER — MELOXICAM 15 MG/1
15 TABLET ORAL DAILY
Qty: 30 TABLET | Refills: 5 | Status: SHIPPED | OUTPATIENT
Start: 2025-07-01

## 2025-07-01 RX ORDER — HEPARIN SODIUM 10000 [USP'U]/100ML
0-4500 INJECTION, SOLUTION INTRAVENOUS CONTINUOUS
Status: DISCONTINUED | OUTPATIENT
Start: 2025-07-01 | End: 2025-07-02

## 2025-07-01 RX ORDER — ATORVASTATIN CALCIUM 80 MG/1
80 TABLET, FILM COATED ORAL NIGHTLY
Status: DISPENSED | OUTPATIENT
Start: 2025-07-01

## 2025-07-01 RX ADMIN — ATORVASTATIN CALCIUM 80 MG: 80 TABLET, FILM COATED ORAL at 21:56

## 2025-07-01 RX ADMIN — SODIUM CHLORIDE 1000 ML: 9 INJECTION, SOLUTION INTRAVENOUS at 21:50

## 2025-07-01 RX ADMIN — OXYCODONE HYDROCHLORIDE AND ACETAMINOPHEN 1 TABLET: 5; 325 TABLET ORAL at 13:47

## 2025-07-01 RX ADMIN — IOHEXOL 90 ML: 350 INJECTION, SOLUTION INTRAVENOUS at 16:12

## 2025-07-01 RX ADMIN — HEPARIN SODIUM 1600 UNITS/HR: 10000 INJECTION, SOLUTION INTRAVENOUS at 14:32

## 2025-07-01 ASSESSMENT — ENCOUNTER SYMPTOMS
GASTROINTESTINAL NEGATIVE: 1
RESPIRATORY NEGATIVE: 1
ALLERGIC/IMMUNOLOGIC NEGATIVE: 1
EYES NEGATIVE: 1

## 2025-07-01 ASSESSMENT — PAIN SCALES - GENERAL
PAINLEVEL_OUTOF10: 0 - NO PAIN
PAINLEVEL_OUTOF10: 10 - WORST POSSIBLE PAIN
PAINLEVEL_OUTOF10: 0 - NO PAIN

## 2025-07-01 ASSESSMENT — LIFESTYLE VARIABLES
EVER FELT BAD OR GUILTY ABOUT YOUR DRINKING: NO
TOTAL SCORE: 0
EVER HAD A DRINK FIRST THING IN THE MORNING TO STEADY YOUR NERVES TO GET RID OF A HANGOVER: NO
HAVE YOU EVER FELT YOU SHOULD CUT DOWN ON YOUR DRINKING: NO
HAVE PEOPLE ANNOYED YOU BY CRITICIZING YOUR DRINKING: NO

## 2025-07-01 ASSESSMENT — PAIN - FUNCTIONAL ASSESSMENT
PAIN_FUNCTIONAL_ASSESSMENT: 0-10
PAIN_FUNCTIONAL_ASSESSMENT: 0-10

## 2025-07-01 NOTE — Clinical Note
Vessel(s): mid left popliteal artery. Guidewire inserted and used as the primary guidewire crosses lesion.

## 2025-07-01 NOTE — ED PROVIDER NOTES
Emergency Department Provider Note        History of Present Illness     History provided by: Patient  Limitations to History: None  External Records Reviewed with Brief Summary: family medicine notes    HPI:  Jamar Gill is a 85 y.o. male with history of CKD, COPD, GERD, hypertension, hyperlipidemia, liver cirrhosis who presents today for evaluation of left leg pain, atraumatic over the last week, patient states he has pain in his Radiating down into the foot.  Patient does endorse that he has at times noticed that his left foot looks white in color and pale.  He does also feel again feels colder than the other side.  Patient denies ever smoking, denies history of coronary artery disease or previous strokes.  Denies any back pain, abdominal pain, chest pain or shortness of breath.  Denies leg swelling, no history of blood clots, malignancy aside from skin cancer, hormone use, recent surgeries hospitalizations or travel.  He was sent here due to concerns for vascular occlusion.    Physical Exam   Triage vitals:  T 36.4 °C (97.5 °F)  HR 77  /77  RR 18  O2 100 % None (Room air)    Physical Exam  Vitals and nursing note reviewed.   Constitutional:       General: He is not in acute distress.     Appearance: Normal appearance. He is not toxic-appearing.   HENT:      Head: Normocephalic and atraumatic.      Nose: Nose normal.   Eyes:      Extraocular Movements: Extraocular movements intact.   Cardiovascular:      Rate and Rhythm: Normal rate and regular rhythm.   Pulmonary:      Effort: Pulmonary effort is normal.   Abdominal:      Palpations: Abdomen is soft.   Musculoskeletal:         General: Normal range of motion.      Cervical back: Normal range of motion and neck supple.      Comments: Left leg from the calf down is noted to be cooler to touch when compared with the right, biphasic pulses PT and DP on the right side, unable to obtain pulses on the left.  Cap refill under 2 seconds bilaterally,  sensation intact and equal.  Normal strength with hip flexion extension, knee flexion extension, dorsi and plantarflexion as well as flexion extension of the big toe.  No wounds to the leg.   Skin:     General: Skin is warm and dry.   Neurological:      General: No focal deficit present.      Mental Status: He is alert.   Psychiatric:         Mood and Affect: Mood normal.         Thought Content: Thought content normal.        CT angio lower extremity left w and or wo IV contrast   Final Result   The arterial system shows only minimal scattered mural plaquing on the   left down through the superficial femoral artery.  The left popliteal   artery shows some narrowing in its origin and then complete occlusion   just above the level of the knee joint.  There is only minimal   incomplete filling of the runoff vessels below the popliteal   trifurcation level..   Signed by Markie Brito MD        Labs Reviewed   CBC WITH AUTO DIFFERENTIAL - Abnormal       Result Value    WBC 9.5      nRBC 0.0      RBC 3.95 (*)     Hemoglobin 12.6 (*)     Hematocrit 36.9 (*)     MCV 93      MCH 31.9      MCHC 34.1      RDW 13.5      Platelets 189      Neutrophils % 64.1      Immature Granulocytes %, Automated 0.2      Lymphocytes % 27.8      Monocytes % 6.2      Eosinophils % 1.5      Basophils % 0.2      Neutrophils Absolute 6.07 (*)     Immature Granulocytes Absolute, Automated 0.02      Lymphocytes Absolute 2.64      Monocytes Absolute 0.59      Eosinophils Absolute 0.14      Basophils Absolute 0.02     COMPREHENSIVE METABOLIC PANEL - Abnormal    Glucose 100 (*)     Sodium 139      Potassium 3.9      Chloride 103      Bicarbonate 24      Anion Gap 16      Urea Nitrogen 36 (*)     Creatinine 1.55 (*)     eGFR 44 (*)     Calcium 9.6      Albumin 4.5      Alkaline Phosphatase 43      Total Protein 7.0      AST 27      Bilirubin, Total 0.5      ALT 21     HEPARIN ASSAY - Abnormal    Heparin Unfractionated 1.6 (*)     Narrative:     The  therapeutic reference range for UFH may be either 0.3-0.6 IU/mL or 0.3-0.7 IU/mL based on the clinical setting for anticoagulant therapy and the associated nomogram used. For Heparin dosing guidelines based on clinical scenario and Heparin Assay results, please refer to local Pharmacy and the [facility] Guidelines for Anticoagulation Therapy available on the [facility] intranet at: [email or web address]   COAGULATION SCREEN - Normal    Protime 11.0      INR 1.0      aPTT 27      Narrative:     The APTT is no longer used for monitoring Unfractionated Heparin Therapy. For monitoring Heparin Therapy, use the Heparin Assay.   TYPE AND SCREEN    ABO TYPE A      Rh TYPE POS      ANTIBODY SCREEN NEG     HEPARIN ASSAY     ED Course as of 25   1343 Called cT for concern for limb threatening occlusion, do not wait for labs [MC]   1520 Called CT again to take patient for CTA []   1720 Dr. Leal vascular surgeon will be down to see the patient. []      ED Course User Index  [] Carie Marquez PA-C         Medical Decision Making & ED Course   Medical Decision Makin y.o. male presents today for evaluation of left leg pain concerning for an acute ischemic limb, I did order a life or limb threatening CT and called CT tech to notify them not to wait on patient's renal function and to get the CT done, labs and coags were obtained as well, patient was given Percocet for pain and I did start high-dose heparin after discussion with ED attending.  He has no abdominal pain back pain chest pain or shortness of breath, suspect occlusion/stenosis more distal given that his pain starts at the calf.    CT angio of the leg showed IMPRESSION:  The arterial system shows only minimal scattered mural plaquing on the  left down through the superficial femoral artery.  The left popliteal  artery shows some narrowing in its  origin and then complete occlusion  just above the level of the knee joint.  There is only minimal  incomplete filling of the runoff vessels below the popliteal  trifurcation level..    I did consult Dr. Leal vascular surgery who saw the patient, recommended admission for thrombectomy tomorrow as well as heparin which was already started initially given high suspicion.  Patient will be admitted to the hospital for further evaluation management for thrombectomy tomorrow.  ----      Differential diagnoses considered include but are not limited to: Peripheral vascular disease, peripheral arterial disease, ischemic limb, dissection, etc.     Social Determinants of Health which Significantly Impact Care: None identified     EKG Independent Interpretation: EKG interpreted by myself. Please see ED Course for full interpretation.    Independent Result Review and Interpretation: Relevant laboratory and radiographic results were reviewed and independently interpreted by myself.  As necessary, they are commented on in the ED Course.    Chronic conditions affecting the patient's care: As documented above in [facility]    The patient was discussed with the following consultants/services: Dr. Leal, admitting team    Care Considerations: As documented above in [facility]    ED Course:  ED Course as of 07/01/25 1955 Tue Jul 01, 2025   1343 Called cT for concern for limb threatening occlusion, do not wait for labs [MC]   1520 Called CT again to take patient for CTA [MC]   1720 Dr. Leal vascular surgeon will be down to see the patient. []      ED Course User Index  [MC] Carie Marquez PA-C     Disposition   As a result of their workup, the patient will require admission to the hospital.  The patient was informed of his diagnosis.  The patient was given the opportunity to ask questions and I answered them. The patient agreed to be admitted to the hospital.    Procedures   Procedures    This was a shared visit with an ED attending.  The  patient was seen and discussed with the ED attending    Carie Marquez PA-C  Emergency Medicine       Carie aMrquez PA-C  07/01/25 1955

## 2025-07-01 NOTE — Clinical Note
Vessel(s): mid and distal left popliteal artery, left PTA, left peroneal artery, left PARKER and left tibio-peroneal trunk. Injected with hand injections. Multiple views taken.

## 2025-07-01 NOTE — Clinical Note
Continued Stay SW/CM Assessment/Plan of Care Note       Active Substitute Decision Maker (SDM)       Domi Blackmon Temporary Decision Maker - Daughter, Legal Guardian   Primary Phone: [phone] (Mobile)                     Progress note:  Reviewed the pt's medical chart.  Collaborated with the pt, Dr. Kehr, RN and pharmacy to discuss the pt's plan of care.  MD completed POC and signed MAR.  Clinicals faxed to Sean as requested.  Transport scheduled for tomorrow at 1100.  Call to guardian Lorri to update.  She is aware that Lupe from [employer] will be calling her to schedule payment for the ride.      Continue to follow for discharge planning.      See SW/CM flowsheets for other objective data.    Disposition Recommendations:  Preliminary discharge destination: Planned Discharge Destination: Assisted living  SW/CM recommendation for discharge: 24 Hour assist, CBRF, Assisted living    Destination Pharmacy:        Discharge Plan/Needs:     Continued Care and Services - Admitted Since 2/9/2024       Destination  Coordination complete.      Service Provider Request Status Selected Services Address Phone Fax    [facility]  Selected Assisted Living [address] [place] [id number] [phone]    [facility] Declined N/A [address] [place] [id number] [phone]    [facility] Declined  Bed not available N/A [address] [facility] [address] [phone]    [facility] Declined  Bed not available N/A [address] [facility] [address] [phone]    [facility] Declined N/A [address] [facility] [address] [id number] [phone] --                      Devices/ Equipment that need to be arranged for discharge: None at this time     Prior To Hospitalization:    Living Situation: Family members and residing at House    .  Support  POST ANGIOGRAM LT LEG Systems: Family members   Home Devices/Equipment: None            Mobility Assist Devices: None   Type of Service Prior to Hospitalization: None               Patient/Family discharge goal (s):  24 Hour assist, Assisted living, [facility]BRF     Resources provided:   Assisted living  Medicare    Therapy Recommendations for Discharge:   PT:      Last Filed Values         Value Time User    PT Discharge Needs  therapy 1-3 times per week 3/4/2024 12:57 PM Nicolle Hare, PT          OT:       Last Filed Values         Value Time User    OT Discharge Needs  therapy 1-3 times per week 2/27/2024 12:53 PM Jeaneth Ma, OT          SLP:    Last Filed Values       None            Mobility Equipment Recommended for Discharge: Pt may have a FWW (not used).      Barriers to Discharge  Identified Barriers to Discharge/Transition Planning: Rehab plan of care obstacle Facility, Payer/Authorization Transportation

## 2025-07-01 NOTE — Clinical Note
Vessel(s): left iliac artery, left CFA and left SFA. Injected with hand injections. Multiple views taken.

## 2025-07-01 NOTE — PROGRESS NOTES
History of Present Illness     Patient Identification  Evaristo Akers is a 85 y.o. male.    Patient information was obtained from patient.      Chief Complaint   Chief Complaint   Patient presents with    Neck Pain    Back Pain    Follow-up       Patient presents with complaint of Neck pain. This is a result of no known injury. Onset of pain was 2 months ago and has been unchanged since. The pain is located in Neck right side, described as dull and rated as moderate, severe, and 10 / 10, with radiation to T1 distribution Right side. Symptoms include no other symptoms. The patient also Denied complains of fever, dysuria, weight loss, history of cancer, history of osteoporosis, history of steroid use, obesity. The patient denies weakness, numbness, incontinence, dysuria, hematuria. The patient denies other injuries. Care prior to arrival consisted of Heat and Pain meds. Pain has gotten    Past Medical History:   Diagnosis Date    GERD (gastroesophageal reflux disease)     Gout     HTN (hypertension)     Hypercholesterolemia     Iron overload     Skin cancer      Family History   Family history unknown: Yes     Current Outpatient Medications   Medication Sig Dispense Refill    traMADol (ULTRAM) 50 MG tablet Take 1 tablet by mouth every 6 hours as needed for Pain for up to 30 days. Max Daily Amount: 200 mg 30 tablet 0    aspirin 81 MG tablet Take 1 tablet by mouth daily      Omega-3 Fatty Acids (FISH OIL) 1000 MG CAPS Take 1 capsule by mouth daily      omeprazole (PRILOSEC) 20 MG delayed release capsule Take 1 capsule by mouth daily      allopurinol (ZYLOPRIM) 300 MG tablet Take 1 tablet by mouth daily      simvastatin (ZOCOR) 10 MG tablet Take 1 tablet by mouth daily      desvenlafaxine succinate (PRISTIQ) 50 MG TB24 extended release tablet Take 1 tablet by mouth daily      lisinopril-hydrochlorothiazide (PRINZIDE;ZESTORETIC) 20-25 MG per tablet Take 1 tablet by mouth daily      fenofibrate (TRICOR) 145 MG tablet

## 2025-07-01 NOTE — TELEPHONE ENCOUNTER
Is there a spot he could be worked in. He went to the neurosurgeon today for a follow up appt. He has constant pain in left lower leg and and his foot is numb. His foot is pale in color but there is no swelling. The Surgeon he seen said he should get into see his PCP as soon s possible that he thinks their is something vascular going on because his pulse in his left foot is week. Please advise    His son Tate is the one to call at [phone]

## 2025-07-01 NOTE — TELEPHONE ENCOUNTER
Spoke w/son.  Reports below.  Pt made son aware today.  Sx's for a few days. Recommended ED eval.  Son voiced understanding will transport pt to ED today.

## 2025-07-01 NOTE — SIGNIFICANT EVENT
Patient has been identified as having an emergent need for administration of iodinated contrast for CT scan prior to result of laboratory studies OR despite known elevated GFR due to possibility of life and/or limb threatening pathology.    I acknowledge the risks and benefits of emergently proceeding with contrast administration including that, at present, it is the position of the [employer] that contrast induced nephropathy (ANGEL) is a rare but possible consequence. At this time the benefits of proceeding with contrast administration outweigh the risks.    Attempts will be made to mitigate possible ANGEL risk with IV fluid hydration if able.

## 2025-07-02 ENCOUNTER — APPOINTMENT (OUTPATIENT)
Dept: RADIOLOGY | Facility: HOSPITAL | Age: 85
DRG: 270 | End: 2025-07-02
Payer: MEDICARE

## 2025-07-02 DIAGNOSIS — I70.202 POPLITEAL ARTERY OCCLUSION, LEFT: ICD-10-CM

## 2025-07-02 DIAGNOSIS — R09.89 OTHER SPECIFIED SYMPTOMS AND SIGNS INVOLVING THE CIRCULATORY AND RESPIRATORY SYSTEMS: ICD-10-CM

## 2025-07-02 LAB
ABO GROUP (TYPE) IN BLOOD: NORMAL
ACT BLD: 274 SEC (ref 83–199)
ACT BLD: 279 SEC (ref 83–199)
ACT BLD: 281 SEC (ref 83–199)
ACT BLD: 313 SEC (ref 83–199)
ACT BLD: >397 SEC (ref 83–199)
ACT BLD: >397 SEC (ref 83–199)
ANION GAP BLDA CALCULATED.4IONS-SCNC: 10 MMO/L (ref 10–25)
ANION GAP SERPL CALC-SCNC: 14 MMOL/L (ref 10–20)
ARTERIAL PATENCY WRIST A: ABNORMAL
ATRIAL RATE: 60 BPM
BASE EXCESS BLDA CALC-SCNC: -4.3 MMOL/L (ref -2–3)
BASE EXCESS BLDV CALC-SCNC: -3.9 MMOL/L (ref -2–3)
BASOPHILS # BLD AUTO: 0.02 X10*3/UL (ref 0–0.1)
BASOPHILS NFR BLD AUTO: 0.1 %
BODY TEMPERATURE: ABNORMAL
BODY TEMPERATURE: ABNORMAL
BUN SERPL-MCNC: 32 MG/DL (ref 6–23)
CA-I BLDA-SCNC: 1.23 MMOL/L (ref 1.1–1.33)
CALCIUM SERPL-MCNC: 7.7 MG/DL (ref 8.6–10.3)
CHLORIDE BLDA-SCNC: 109 MMOL/L (ref 98–107)
CHLORIDE SERPL-SCNC: 109 MMOL/L (ref 98–107)
CK SERPL-CCNC: 1181 U/L (ref 0–325)
CK SERPL-CCNC: 3054 U/L (ref 0–325)
CO2 SERPL-SCNC: 20 MMOL/L (ref 21–32)
CREAT SERPL-MCNC: 1.6 MG/DL (ref 0.5–1.3)
EGFRCR SERPLBLD CKD-EPI 2021: 42 ML/MIN/1.73M*2
EOSINOPHIL # BLD AUTO: 0 X10*3/UL (ref 0–0.4)
EOSINOPHIL NFR BLD AUTO: 0 %
ERYTHROCYTE [DISTWIDTH] IN BLOOD BY AUTOMATED COUNT: 13.7 % (ref 11.5–14.5)
ERYTHROCYTE [DISTWIDTH] IN BLOOD BY AUTOMATED COUNT: 14.4 % (ref 11.5–14.5)
ERYTHROCYTE [DISTWIDTH] IN BLOOD BY AUTOMATED COUNT: 14.5 % (ref 11.5–14.5)
GLUCOSE BLD MANUAL STRIP-MCNC: 142 MG/DL (ref 74–99)
GLUCOSE BLDA-MCNC: 143 MG/DL (ref 74–99)
GLUCOSE SERPL-MCNC: 150 MG/DL (ref 74–99)
HCO3 BLDA-SCNC: 19.8 MMOL/L (ref 22–26)
HCO3 BLDV-SCNC: 19.5 MMOL/L (ref 22–26)
HCT VFR BLD AUTO: 25.9 % (ref 41–52)
HCT VFR BLD AUTO: 30.4 % (ref 41–52)
HCT VFR BLD AUTO: 30.8 % (ref 41–52)
HCT VFR BLD EST: 30 % (ref 41–52)
HGB BLD-MCNC: 10 G/DL (ref 13.5–17.5)
HGB BLD-MCNC: 8.6 G/DL (ref 13.5–17.5)
HGB BLD-MCNC: 9.8 G/DL (ref 13.5–17.5)
HGB BLDA-MCNC: 10 G/DL (ref 13.5–17.5)
HOLD SPECIMEN: NORMAL
HOLD SPECIMEN: NORMAL
IMM GRANULOCYTES # BLD AUTO: 0.07 X10*3/UL (ref 0–0.5)
IMM GRANULOCYTES NFR BLD AUTO: 0.4 % (ref 0–0.9)
LACTATE BLDA-SCNC: 0.6 MMOL/L (ref 0.4–2)
LACTATE SERPL-SCNC: 1.9 MMOL/L (ref 0.4–2)
LACTATE SERPL-SCNC: 2.3 MMOL/L (ref 0.4–2)
LYMPHOCYTES # BLD AUTO: 1.57 X10*3/UL (ref 0.8–3)
LYMPHOCYTES NFR BLD AUTO: 8.2 %
MCH RBC QN AUTO: 31.3 PG (ref 26–34)
MCH RBC QN AUTO: 31.4 PG (ref 26–34)
MCH RBC QN AUTO: 32 PG (ref 26–34)
MCHC RBC AUTO-ENTMCNC: 32.2 G/DL (ref 32–36)
MCHC RBC AUTO-ENTMCNC: 32.5 G/DL (ref 32–36)
MCHC RBC AUTO-ENTMCNC: 33.2 G/DL (ref 32–36)
MCV RBC AUTO: 96 FL (ref 80–100)
MCV RBC AUTO: 97 FL (ref 80–100)
MCV RBC AUTO: 97 FL (ref 80–100)
MONOCYTES # BLD AUTO: 0.93 X10*3/UL (ref 0.05–0.8)
MONOCYTES NFR BLD AUTO: 4.8 %
NEUTROPHILS # BLD AUTO: 16.62 X10*3/UL (ref 1.6–5.5)
NEUTROPHILS NFR BLD AUTO: 86.5 %
NRBC BLD-RTO: 0 /100 WBCS (ref 0–0)
OXYHGB MFR BLDA: 97.1 % (ref 94–98)
OXYHGB MFR BLDV: 97.5 % (ref 45–75)
P AXIS: 70 DEGREES
P OFFSET: 124 MS
P ONSET: 63 MS
PCO2 BLDA: 32 MM HG (ref 38–42)
PCO2 BLDV: 30 MM HG (ref 41–51)
PH BLDA: 7.4 PH (ref 7.38–7.42)
PH BLDV: 7.42 PH (ref 7.33–7.43)
PLATELET # BLD AUTO: 156 X10*3/UL (ref 150–450)
PLATELET # BLD AUTO: 157 X10*3/UL (ref 150–450)
PLATELET # BLD AUTO: 162 X10*3/UL (ref 150–450)
PO2 BLDA: 125 MM HG (ref 85–95)
PO2 BLDV: 116 MM HG (ref 35–45)
POTASSIUM BLDA-SCNC: 3.8 MMOL/L (ref 3.5–5.3)
POTASSIUM SERPL-SCNC: 4 MMOL/L (ref 3.5–5.3)
PR INTERVAL: 286 MS
Q ONSET: 206 MS
QRS COUNT: 10 BEATS
QRS DURATION: 162 MS
QT INTERVAL: 470 MS
QTC CALCULATION(BAZETT): 470 MS
QTC FREDERICIA: 470 MS
R AXIS: -66 DEGREES
RBC # BLD AUTO: 2.69 X10*6/UL (ref 4.5–5.9)
RBC # BLD AUTO: 3.13 X10*6/UL (ref 4.5–5.9)
RBC # BLD AUTO: 3.18 X10*6/UL (ref 4.5–5.9)
RH FACTOR (ANTIGEN D): NORMAL
SAO2 % BLDA: 99 % (ref 94–100)
SAO2 % BLDV: 99 % (ref 45–75)
SITE OF ARTERIAL PUNCTURE: ABNORMAL
SODIUM BLDA-SCNC: 135 MMOL/L (ref 136–145)
SODIUM SERPL-SCNC: 139 MMOL/L (ref 136–145)
T AXIS: 18 DEGREES
T OFFSET: 441 MS
UFH PPP CHRO-ACNC: 1.1 IU/ML (ref ?–1.1)
VENTRICULAR RATE: 60 BPM
WBC # BLD AUTO: 15.4 X10*3/UL (ref 4.4–11.3)
WBC # BLD AUTO: 18.3 X10*3/UL (ref 4.4–11.3)
WBC # BLD AUTO: 19.2 X10*3/UL (ref 4.4–11.3)

## 2025-07-02 PROCEDURE — C1729 CATH, DRAINAGE: HCPCS | Performed by: STUDENT IN AN ORGANIZED HEALTH CARE EDUCATION/TRAINING PROGRAM

## 2025-07-02 PROCEDURE — 37228 PR REVSC OPN/PRQ TIB/PERO W/ANGIOPLASTY UNI: CPT | Performed by: STUDENT IN AN ORGANIZED HEALTH CARE EDUCATION/TRAINING PROGRAM

## 2025-07-02 PROCEDURE — 99153 MOD SED SAME PHYS/QHP EA: CPT | Performed by: STUDENT IN AN ORGANIZED HEALTH CARE EDUCATION/TRAINING PROGRAM

## 2025-07-02 PROCEDURE — 75710 ARTERY X-RAYS ARM/LEG: CPT | Performed by: STUDENT IN AN ORGANIZED HEALTH CARE EDUCATION/TRAINING PROGRAM

## 2025-07-02 PROCEDURE — 2550000001 HC RX 255 CONTRASTS: Mod: JW | Performed by: STUDENT IN AN ORGANIZED HEALTH CARE EDUCATION/TRAINING PROGRAM

## 2025-07-02 PROCEDURE — 2500000001 HC RX 250 WO HCPCS SELF ADMINISTERED DRUGS (ALT 637 FOR MEDICARE OP): Performed by: STUDENT IN AN ORGANIZED HEALTH CARE EDUCATION/TRAINING PROGRAM

## 2025-07-02 PROCEDURE — 2550000001 HC RX 255 CONTRASTS: Performed by: STUDENT IN AN ORGANIZED HEALTH CARE EDUCATION/TRAINING PROGRAM

## 2025-07-02 PROCEDURE — 82550 ASSAY OF CK (CPK): CPT | Performed by: INTERNAL MEDICINE

## 2025-07-02 PROCEDURE — 2500000004 HC RX 250 GENERAL PHARMACY W/ HCPCS (ALT 636 FOR OP/ED): Performed by: STUDENT IN AN ORGANIZED HEALTH CARE EDUCATION/TRAINING PROGRAM

## 2025-07-02 PROCEDURE — 37184 PRIM ART M-THRMBC 1ST VSL: CPT | Mod: LT | Performed by: STUDENT IN AN ORGANIZED HEALTH CARE EDUCATION/TRAINING PROGRAM

## 2025-07-02 PROCEDURE — 36415 COLL VENOUS BLD VENIPUNCTURE: CPT | Performed by: INTERNAL MEDICINE

## 2025-07-02 PROCEDURE — B41G1ZZ FLUOROSCOPY OF LEFT LOWER EXTREMITY ARTERIES USING LOW OSMOLAR CONTRAST: ICD-10-PCS | Performed by: STUDENT IN AN ORGANIZED HEALTH CARE EDUCATION/TRAINING PROGRAM

## 2025-07-02 PROCEDURE — 2500000001 HC RX 250 WO HCPCS SELF ADMINISTERED DRUGS (ALT 637 FOR MEDICARE OP): Performed by: INTERNAL MEDICINE

## 2025-07-02 PROCEDURE — 37184 PRIM ART M-THRMBC 1ST VSL: CPT | Performed by: STUDENT IN AN ORGANIZED HEALTH CARE EDUCATION/TRAINING PROGRAM

## 2025-07-02 PROCEDURE — 37228 HC REVASCULARIZE TIBIAL/PERON ARTERY,ANGIOPLASTY INITIAL: CPT | Mod: LT | Performed by: STUDENT IN AN ORGANIZED HEALTH CARE EDUCATION/TRAINING PROGRAM

## 2025-07-02 PROCEDURE — 04CN3ZZ EXTIRPATION OF MATTER FROM LEFT POPLITEAL ARTERY, PERCUTANEOUS APPROACH: ICD-10-PCS | Performed by: STUDENT IN AN ORGANIZED HEALTH CARE EDUCATION/TRAINING PROGRAM

## 2025-07-02 PROCEDURE — C1894 INTRO/SHEATH, NON-LASER: HCPCS | Performed by: STUDENT IN AN ORGANIZED HEALTH CARE EDUCATION/TRAINING PROGRAM

## 2025-07-02 PROCEDURE — 82947 ASSAY GLUCOSE BLOOD QUANT: CPT

## 2025-07-02 PROCEDURE — 7100000010 HC PHASE TWO TIME - EACH INCREMENTAL 1 MINUTE: Performed by: STUDENT IN AN ORGANIZED HEALTH CARE EDUCATION/TRAINING PROGRAM

## 2025-07-02 PROCEDURE — 73706 CT ANGIO LWR EXTR W/O&W/DYE: CPT | Mod: LEFT SIDE | Performed by: STUDENT IN AN ORGANIZED HEALTH CARE EDUCATION/TRAINING PROGRAM

## 2025-07-02 PROCEDURE — 82550 ASSAY OF CK (CPK): CPT

## 2025-07-02 PROCEDURE — 047L3Z1 DILATION OF LEFT FEMORAL ARTERY USING DRUG-COATED BALLOON, PERCUTANEOUS APPROACH: ICD-10-PCS | Performed by: STUDENT IN AN ORGANIZED HEALTH CARE EDUCATION/TRAINING PROGRAM

## 2025-07-02 PROCEDURE — 84132 ASSAY OF SERUM POTASSIUM: CPT | Performed by: INTERNAL MEDICINE

## 2025-07-02 PROCEDURE — 82810 BLOOD GASES O2 SAT ONLY: CPT

## 2025-07-02 PROCEDURE — 7100000009 HC PHASE TWO TIME - INITIAL BASE CHARGE: Performed by: STUDENT IN AN ORGANIZED HEALTH CARE EDUCATION/TRAINING PROGRAM

## 2025-07-02 PROCEDURE — 99152 MOD SED SAME PHYS/QHP 5/>YRS: CPT | Performed by: STUDENT IN AN ORGANIZED HEALTH CARE EDUCATION/TRAINING PROGRAM

## 2025-07-02 PROCEDURE — C1769 GUIDE WIRE: HCPCS | Performed by: STUDENT IN AN ORGANIZED HEALTH CARE EDUCATION/TRAINING PROGRAM

## 2025-07-02 PROCEDURE — 36430 TRANSFUSION BLD/BLD COMPNT: CPT | Performed by: STUDENT IN AN ORGANIZED HEALTH CARE EDUCATION/TRAINING PROGRAM

## 2025-07-02 PROCEDURE — 83605 ASSAY OF LACTIC ACID: CPT | Performed by: NURSE PRACTITIONER

## 2025-07-02 PROCEDURE — 85027 COMPLETE CBC AUTOMATED: CPT | Performed by: INTERNAL MEDICINE

## 2025-07-02 PROCEDURE — C1760 CLOSURE DEV, VASC: HCPCS | Performed by: STUDENT IN AN ORGANIZED HEALTH CARE EDUCATION/TRAINING PROGRAM

## 2025-07-02 PROCEDURE — 37224 HC REVASCULARIZE FEM/POP ARTERY,ANGIOPLASTY: CPT | Mod: 22,LT | Performed by: STUDENT IN AN ORGANIZED HEALTH CARE EDUCATION/TRAINING PROGRAM

## 2025-07-02 PROCEDURE — 37224 PR REVSC OPN/PRG FEM/POP W/ANGIOPLASTY UNI: CPT | Performed by: STUDENT IN AN ORGANIZED HEALTH CARE EDUCATION/TRAINING PROGRAM

## 2025-07-02 PROCEDURE — C1725 CATH, TRANSLUMIN NON-LASER: HCPCS | Performed by: STUDENT IN AN ORGANIZED HEALTH CARE EDUCATION/TRAINING PROGRAM

## 2025-07-02 PROCEDURE — 85347 COAGULATION TIME ACTIVATED: CPT | Performed by: STUDENT IN AN ORGANIZED HEALTH CARE EDUCATION/TRAINING PROGRAM

## 2025-07-02 PROCEDURE — 37185 PRIM ART M-THRMBC SBSQ VSL: CPT | Performed by: STUDENT IN AN ORGANIZED HEALTH CARE EDUCATION/TRAINING PROGRAM

## 2025-07-02 PROCEDURE — 2500000005 HC RX 250 GENERAL PHARMACY W/O HCPCS: Performed by: INTERNAL MEDICINE

## 2025-07-02 PROCEDURE — 85027 COMPLETE CBC AUTOMATED: CPT

## 2025-07-02 PROCEDURE — 85025 COMPLETE CBC W/AUTO DIFF WBC: CPT

## 2025-07-02 PROCEDURE — 75774 ARTERY X-RAY EACH VESSEL: CPT | Performed by: STUDENT IN AN ORGANIZED HEALTH CARE EDUCATION/TRAINING PROGRAM

## 2025-07-02 PROCEDURE — C1884 EMBOLIZATION PROTECT SYST: HCPCS | Performed by: STUDENT IN AN ORGANIZED HEALTH CARE EDUCATION/TRAINING PROGRAM

## 2025-07-02 PROCEDURE — 2780000003 HC OR 278 NO HCPCS: Performed by: STUDENT IN AN ORGANIZED HEALTH CARE EDUCATION/TRAINING PROGRAM

## 2025-07-02 PROCEDURE — P9016 RBC LEUKOCYTES REDUCED: HCPCS

## 2025-07-02 PROCEDURE — 2500000004 HC RX 250 GENERAL PHARMACY W/ HCPCS (ALT 636 FOR OP/ED)

## 2025-07-02 PROCEDURE — 047Q3ZZ DILATION OF LEFT ANTERIOR TIBIAL ARTERY, PERCUTANEOUS APPROACH: ICD-10-PCS | Performed by: STUDENT IN AN ORGANIZED HEALTH CARE EDUCATION/TRAINING PROGRAM

## 2025-07-02 PROCEDURE — 84132 ASSAY OF SERUM POTASSIUM: CPT

## 2025-07-02 PROCEDURE — 2720000007 HC OR 272 NO HCPCS: Performed by: STUDENT IN AN ORGANIZED HEALTH CARE EDUCATION/TRAINING PROGRAM

## 2025-07-02 PROCEDURE — 76937 US GUIDE VASCULAR ACCESS: CPT | Performed by: STUDENT IN AN ORGANIZED HEALTH CARE EDUCATION/TRAINING PROGRAM

## 2025-07-02 PROCEDURE — 2500000004 HC RX 250 GENERAL PHARMACY W/ HCPCS (ALT 636 FOR OP/ED): Performed by: INTERNAL MEDICINE

## 2025-07-02 PROCEDURE — C1887 CATHETER, GUIDING: HCPCS | Performed by: STUDENT IN AN ORGANIZED HEALTH CARE EDUCATION/TRAINING PROGRAM

## 2025-07-02 PROCEDURE — 04CQ3ZZ EXTIRPATION OF MATTER FROM LEFT ANTERIOR TIBIAL ARTERY, PERCUTANEOUS APPROACH: ICD-10-PCS | Performed by: STUDENT IN AN ORGANIZED HEALTH CARE EDUCATION/TRAINING PROGRAM

## 2025-07-02 PROCEDURE — 85347 COAGULATION TIME ACTIVATED: CPT

## 2025-07-02 PROCEDURE — 36415 COLL VENOUS BLD VENIPUNCTURE: CPT | Performed by: STUDENT IN AN ORGANIZED HEALTH CARE EDUCATION/TRAINING PROGRAM

## 2025-07-02 PROCEDURE — C1757 CATH, THROMBECTOMY/EMBOLECT: HCPCS | Performed by: STUDENT IN AN ORGANIZED HEALTH CARE EDUCATION/TRAINING PROGRAM

## 2025-07-02 PROCEDURE — 73706 CT ANGIO LWR EXTR W/O&W/DYE: CPT | Mod: LT

## 2025-07-02 PROCEDURE — C2623 CATH, TRANSLUMIN, DRUG-COAT: HCPCS | Performed by: STUDENT IN AN ORGANIZED HEALTH CARE EDUCATION/TRAINING PROGRAM

## 2025-07-02 PROCEDURE — 2020000001 HC ICU ROOM DAILY

## 2025-07-02 PROCEDURE — 85520 HEPARIN ASSAY: CPT | Performed by: STUDENT IN AN ORGANIZED HEALTH CARE EDUCATION/TRAINING PROGRAM

## 2025-07-02 RX ORDER — ASPIRIN 81 MG/1
81 TABLET ORAL DAILY
Status: DISCONTINUED | OUTPATIENT
Start: 2025-07-02 | End: 2025-07-05

## 2025-07-02 RX ORDER — IODIXANOL 320 MG/ML
INJECTION, SOLUTION INTRAVASCULAR AS NEEDED
Status: DISCONTINUED | OUTPATIENT
Start: 2025-07-02 | End: 2025-07-02 | Stop reason: HOSPADM

## 2025-07-02 RX ORDER — ASPIRIN 325 MG
TABLET ORAL AS NEEDED
Status: DISCONTINUED | OUTPATIENT
Start: 2025-07-02 | End: 2025-07-02 | Stop reason: HOSPADM

## 2025-07-02 RX ORDER — ACETAMINOPHEN 325 MG/1
650 TABLET ORAL EVERY 4 HOURS PRN
Status: ACTIVE | OUTPATIENT
Start: 2025-07-02

## 2025-07-02 RX ORDER — POLYETHYLENE GLYCOL 3350 17 G/17G
17 POWDER, FOR SOLUTION ORAL 2 TIMES DAILY
Status: DISPENSED | OUTPATIENT
Start: 2025-07-02

## 2025-07-02 RX ORDER — HYDRALAZINE HYDROCHLORIDE 20 MG/ML
INJECTION INTRAMUSCULAR; INTRAVENOUS AS NEEDED
Status: DISCONTINUED | OUTPATIENT
Start: 2025-07-02 | End: 2025-07-02 | Stop reason: HOSPADM

## 2025-07-02 RX ORDER — EPTIFIBATIDE 0.75 MG/ML
2 INJECTION, SOLUTION INTRAVENOUS CONTINUOUS
Status: DISCONTINUED | OUTPATIENT
Start: 2025-07-02 | End: 2025-07-02

## 2025-07-02 RX ORDER — LIDOCAINE HYDROCHLORIDE 20 MG/ML
INJECTION, SOLUTION INFILTRATION; PERINEURAL AS NEEDED
Status: DISCONTINUED | OUTPATIENT
Start: 2025-07-02 | End: 2025-07-02 | Stop reason: HOSPADM

## 2025-07-02 RX ORDER — ONDANSETRON 4 MG/1
4 TABLET, FILM COATED ORAL EVERY 8 HOURS PRN
Status: ACTIVE | OUTPATIENT
Start: 2025-07-02

## 2025-07-02 RX ORDER — HYDROMORPHONE HYDROCHLORIDE 0.2 MG/ML
0.2 INJECTION INTRAMUSCULAR; INTRAVENOUS; SUBCUTANEOUS
Status: ACTIVE | OUTPATIENT
Start: 2025-07-02

## 2025-07-02 RX ORDER — FENTANYL CITRATE 50 UG/ML
INJECTION, SOLUTION INTRAMUSCULAR; INTRAVENOUS AS NEEDED
Status: DISCONTINUED | OUTPATIENT
Start: 2025-07-02 | End: 2025-07-02 | Stop reason: HOSPADM

## 2025-07-02 RX ORDER — EPTIFIBATIDE 2 MG/ML
INJECTION, SOLUTION INTRAVENOUS CONTINUOUS PRN
Status: COMPLETED | OUTPATIENT
Start: 2025-07-02 | End: 2025-07-02

## 2025-07-02 RX ORDER — TALC
3 POWDER (GRAM) TOPICAL NIGHTLY
Status: DISPENSED | OUTPATIENT
Start: 2025-07-02

## 2025-07-02 RX ORDER — VENLAFAXINE HYDROCHLORIDE 75 MG/1
75 CAPSULE, EXTENDED RELEASE ORAL DAILY
Status: DISPENSED | OUTPATIENT
Start: 2025-07-02

## 2025-07-02 RX ORDER — NITROGLYCERIN 5 MG/ML
INJECTION, SOLUTION INTRAVENOUS AS NEEDED
Status: DISCONTINUED | OUTPATIENT
Start: 2025-07-02 | End: 2025-07-02 | Stop reason: HOSPADM

## 2025-07-02 RX ORDER — PHENYLEPHRINE HCL IN 0.9% NACL 1 MG/10 ML
SYRINGE (ML) INTRAVENOUS AS NEEDED
Status: DISCONTINUED | OUTPATIENT
Start: 2025-07-02 | End: 2025-07-02 | Stop reason: HOSPADM

## 2025-07-02 RX ORDER — ONDANSETRON HYDROCHLORIDE 2 MG/ML
4 INJECTION, SOLUTION INTRAVENOUS EVERY 8 HOURS PRN
Status: ACTIVE | OUTPATIENT
Start: 2025-07-02

## 2025-07-02 RX ORDER — ALLOPURINOL 300 MG/1
300 TABLET ORAL DAILY
Status: DISPENSED | OUTPATIENT
Start: 2025-07-02

## 2025-07-02 RX ORDER — CLOPIDOGREL BISULFATE 300 MG/1
TABLET, FILM COATED ORAL AS NEEDED
Status: DISCONTINUED | OUTPATIENT
Start: 2025-07-02 | End: 2025-07-02 | Stop reason: HOSPADM

## 2025-07-02 RX ORDER — EPTIFIBATIDE 0.75 MG/ML
INJECTION, SOLUTION INTRAVENOUS CONTINUOUS PRN
Status: COMPLETED | OUTPATIENT
Start: 2025-07-02 | End: 2025-07-02

## 2025-07-02 RX ORDER — HEPARIN SODIUM 1000 [USP'U]/ML
INJECTION, SOLUTION INTRAVENOUS; SUBCUTANEOUS AS NEEDED
Status: DISCONTINUED | OUTPATIENT
Start: 2025-07-02 | End: 2025-07-02 | Stop reason: HOSPADM

## 2025-07-02 RX ORDER — ACETAMINOPHEN 160 MG/5ML
650 SOLUTION ORAL EVERY 4 HOURS PRN
Status: ACTIVE | OUTPATIENT
Start: 2025-07-02

## 2025-07-02 RX ORDER — FENOFIBRATE 160 MG/1
160 TABLET ORAL DAILY
Status: DISPENSED | OUTPATIENT
Start: 2025-07-02

## 2025-07-02 RX ORDER — ACETAMINOPHEN 650 MG/1
650 SUPPOSITORY RECTAL EVERY 4 HOURS PRN
Status: ACTIVE | OUTPATIENT
Start: 2025-07-02

## 2025-07-02 RX ORDER — PANTOPRAZOLE SODIUM 40 MG/1
40 TABLET, DELAYED RELEASE ORAL
Status: DISPENSED | OUTPATIENT
Start: 2025-07-02

## 2025-07-02 RX ORDER — SODIUM CHLORIDE, SODIUM LACTATE, POTASSIUM CHLORIDE, CALCIUM CHLORIDE 600; 310; 30; 20 MG/100ML; MG/100ML; MG/100ML; MG/100ML
125 INJECTION, SOLUTION INTRAVENOUS CONTINUOUS
Status: ACTIVE | OUTPATIENT
Start: 2025-07-02 | End: 2025-07-03

## 2025-07-02 RX ORDER — MIDAZOLAM HYDROCHLORIDE 1 MG/ML
INJECTION, SOLUTION INTRAMUSCULAR; INTRAVENOUS AS NEEDED
Status: DISCONTINUED | OUTPATIENT
Start: 2025-07-02 | End: 2025-07-02 | Stop reason: HOSPADM

## 2025-07-02 RX ADMIN — SODIUM CHLORIDE 500 ML: 9 INJECTION, SOLUTION INTRAVENOUS at 15:20

## 2025-07-02 RX ADMIN — SODIUM CHLORIDE 500 ML: 9 INJECTION, SOLUTION INTRAVENOUS at 14:28

## 2025-07-02 RX ADMIN — HYDRALAZINE HYDROCHLORIDE 10 MG: 20 INJECTION, SOLUTION INTRAMUSCULAR; INTRAVENOUS at 09:46

## 2025-07-02 RX ADMIN — HYDROCHLOROTHIAZIDE: 25 TABLET ORAL at 05:37

## 2025-07-02 RX ADMIN — IOHEXOL 90 ML: 350 INJECTION, SOLUTION INTRAVENOUS at 18:50

## 2025-07-02 RX ADMIN — MIDAZOLAM 0.5 MG: 1 INJECTION INTRAMUSCULAR; INTRAVENOUS at 09:48

## 2025-07-02 RX ADMIN — ALTEPLASE 4 MG: 2.2 INJECTION, POWDER, LYOPHILIZED, FOR SOLUTION INTRAVENOUS at 09:52

## 2025-07-02 RX ADMIN — SODIUM CHLORIDE, SODIUM LACTATE, POTASSIUM CHLORIDE, AND CALCIUM CHLORIDE 125 ML/HR: .6; .31; .03; .02 INJECTION, SOLUTION INTRAVENOUS at 19:27

## 2025-07-02 RX ADMIN — PANTOPRAZOLE SODIUM 40 MG: 40 TABLET, DELAYED RELEASE ORAL at 05:38

## 2025-07-02 RX ADMIN — ONDANSETRON 4 MG: 2 INJECTION, SOLUTION INTRAMUSCULAR; INTRAVENOUS at 14:30

## 2025-07-02 RX ADMIN — FENTANYL CITRATE 25 MCG: 50 INJECTION, SOLUTION INTRAMUSCULAR; INTRAVENOUS at 09:49

## 2025-07-02 RX ADMIN — ATORVASTATIN CALCIUM 80 MG: 80 TABLET, FILM COATED ORAL at 21:00

## 2025-07-02 RX ADMIN — Medication 3 MG: at 21:00

## 2025-07-02 RX ADMIN — HEPARIN SODIUM 1600 UNITS/HR: 10000 INJECTION, SOLUTION INTRAVENOUS at 06:19

## 2025-07-02 RX ADMIN — POLYETHYLENE GLYCOL 3350 17 G: 17 POWDER, FOR SOLUTION ORAL at 21:00

## 2025-07-02 SDOH — HEALTH STABILITY: MENTAL HEALTH: HOW OFTEN DO YOU HAVE A DRINK CONTAINING ALCOHOL?: NEVER

## 2025-07-02 SDOH — ECONOMIC STABILITY: FOOD INSECURITY: WITHIN THE PAST 12 MONTHS, THE FOOD YOU BOUGHT JUST DIDN'T LAST AND YOU DIDN'T HAVE MONEY TO GET MORE.: NEVER TRUE

## 2025-07-02 SDOH — ECONOMIC STABILITY: FOOD INSECURITY: WITHIN THE PAST 12 MONTHS, YOU WORRIED THAT YOUR FOOD WOULD RUN OUT BEFORE YOU GOT THE MONEY TO BUY MORE.: NEVER TRUE

## 2025-07-02 SDOH — SOCIAL STABILITY: SOCIAL INSECURITY: ARE THERE ANY APPARENT SIGNS OF INJURIES/BEHAVIORS THAT COULD BE RELATED TO ABUSE/NEGLECT?: NO

## 2025-07-02 SDOH — ECONOMIC STABILITY: INCOME INSECURITY: IN THE PAST 12 MONTHS HAS THE ELECTRIC, GAS, OIL, OR WATER COMPANY THREATENED TO SHUT OFF SERVICES IN YOUR HOME?: NO

## 2025-07-02 SDOH — SOCIAL STABILITY: SOCIAL INSECURITY: WERE YOU ABLE TO COMPLETE ALL THE BEHAVIORAL HEALTH SCREENINGS?: YES

## 2025-07-02 SDOH — SOCIAL STABILITY: SOCIAL INSECURITY: WITHIN THE LAST YEAR, HAVE YOU BEEN HUMILIATED OR EMOTIONALLY ABUSED IN OTHER WAYS BY YOUR PARTNER OR EX-PARTNER?: NO

## 2025-07-02 SDOH — SOCIAL STABILITY: SOCIAL INSECURITY
WITHIN THE LAST YEAR, HAVE YOU BEEN KICKED, HIT, SLAPPED, OR OTHERWISE PHYSICALLY HURT BY YOUR PARTNER OR EX-PARTNER?: NO

## 2025-07-02 SDOH — SOCIAL STABILITY: SOCIAL INSECURITY
WITHIN THE LAST YEAR, HAVE YOU BEEN RAPED OR FORCED TO HAVE ANY KIND OF SEXUAL ACTIVITY BY YOUR PARTNER OR EX-PARTNER?: NO

## 2025-07-02 SDOH — SOCIAL STABILITY: SOCIAL INSECURITY: WITHIN THE LAST YEAR, HAVE YOU BEEN AFRAID OF YOUR PARTNER OR EX-PARTNER?: NO

## 2025-07-02 SDOH — SOCIAL STABILITY: SOCIAL INSECURITY: DOES ANYONE TRY TO KEEP YOU FROM HAVING/CONTACTING OTHER FRIENDS OR DOING THINGS OUTSIDE YOUR HOME?: NO

## 2025-07-02 SDOH — HEALTH STABILITY: MENTAL HEALTH: HOW MANY DRINKS CONTAINING ALCOHOL DO YOU HAVE ON A TYPICAL DAY WHEN YOU ARE DRINKING?: PATIENT DOES NOT DRINK

## 2025-07-02 SDOH — HEALTH STABILITY: MENTAL HEALTH: HOW OFTEN DO YOU HAVE SIX OR MORE DRINKS ON ONE OCCASION?: NEVER

## 2025-07-02 SDOH — SOCIAL STABILITY: SOCIAL INSECURITY: ABUSE: ADULT

## 2025-07-02 SDOH — SOCIAL STABILITY: SOCIAL INSECURITY: ARE YOU OR HAVE YOU BEEN THREATENED OR ABUSED PHYSICALLY, EMOTIONALLY, OR SEXUALLY BY ANYONE?: NO

## 2025-07-02 SDOH — SOCIAL STABILITY: SOCIAL INSECURITY: HAVE YOU HAD THOUGHTS OF HARMING ANYONE ELSE?: NO

## 2025-07-02 SDOH — SOCIAL STABILITY: SOCIAL INSECURITY: DO YOU FEEL UNSAFE GOING BACK TO THE PLACE WHERE YOU ARE LIVING?: NO

## 2025-07-02 SDOH — SOCIAL STABILITY: SOCIAL INSECURITY: HAS ANYONE EVER THREATENED TO HURT YOUR FAMILY OR YOUR PETS?: NO

## 2025-07-02 SDOH — SOCIAL STABILITY: SOCIAL INSECURITY: DO YOU FEEL ANYONE HAS EXPLOITED OR TAKEN ADVANTAGE OF YOU FINANCIALLY OR OF YOUR PERSONAL PROPERTY?: NO

## 2025-07-02 ASSESSMENT — ACTIVITIES OF DAILY LIVING (ADL)
JUDGMENT_ADEQUATE_SAFELY_COMPLETE_DAILY_ACTIVITIES: YES
PATIENT'S MEMORY ADEQUATE TO SAFELY COMPLETE DAILY ACTIVITIES?: YES
ADEQUATE_TO_COMPLETE_ADL: YES
BATHING: INDEPENDENT
DRESSING YOURSELF: INDEPENDENT
HEARING - LEFT EAR: FUNCTIONAL
HEARING - RIGHT EAR: FUNCTIONAL
WALKS IN HOME: INDEPENDENT
TOILETING: INDEPENDENT
ASSISTIVE_DEVICE: EYEGLASSES
FEEDING YOURSELF: INDEPENDENT
LACK_OF_TRANSPORTATION: NO
GROOMING: INDEPENDENT

## 2025-07-02 ASSESSMENT — PAIN SCALES - GENERAL: PAINLEVEL_OUTOF10: 0 - NO PAIN

## 2025-07-02 ASSESSMENT — COGNITIVE AND FUNCTIONAL STATUS - GENERAL
DAILY ACTIVITIY SCORE: 24
PATIENT BASELINE BEDBOUND: NO
MOBILITY SCORE: 24

## 2025-07-02 ASSESSMENT — PAIN - FUNCTIONAL ASSESSMENT: PAIN_FUNCTIONAL_ASSESSMENT: 0-10

## 2025-07-02 ASSESSMENT — LIFESTYLE VARIABLES
SKIP TO QUESTIONS 9-10: 1
AUDIT-C TOTAL SCORE: 0

## 2025-07-02 ASSESSMENT — PATIENT HEALTH QUESTIONNAIRE - PHQ9
SUM OF ALL RESPONSES TO PHQ9 QUESTIONS 1 & 2: 0
2. FEELING DOWN, DEPRESSED OR HOPELESS: NOT AT ALL
1. LITTLE INTEREST OR PLEASURE IN DOING THINGS: NOT AT ALL

## 2025-07-02 ASSESSMENT — ENCOUNTER SYMPTOMS
DIARRHEA: 0
CHEST TIGHTNESS: 0
COUGH: 0
MYALGIAS: 1
DYSURIA: 0
CONSTIPATION: 0
ABDOMINAL PAIN: 0
SHORTNESS OF BREATH: 0

## 2025-07-02 NOTE — CARE PLAN
The patient's goals for the shift include      The clinical goals for the shift include no s/s of bleeding, no pain in LLE    Over the shift, the patient did make progress toward the following goal of no s/s of bleeding and no pain in LLE at rest.

## 2025-07-02 NOTE — NURSING NOTE
Transferred to ICU, blood infusing without incident. Bedside report given to [facility] ICU. R Groin soft and slight ooze, unchanged from initial assessment.  + doppled bilat DP. Left thigh firm and painful.  Was unable to void per urinal sitting up bedside. C/o lightheaded when sitting.  Patient has not voided since 7 am.    Critical care team aware and stated will bladder scan on arrival.

## 2025-07-02 NOTE — CONSULTS
Critical Care Medicine Consult    Consults    Subjective     HPI  Patient is a 85 y.o. male admitted on 7/1/2025 12:46 PM  with history hemochromatosis with cirrhosis, dyslipidemia, hypertension, COPD, CKD 3, cervical radiculopathy, gout presents with chief complaint of left lower extremity pain that was worsening over the past week.  CTA revealed popliteal occlusion with reconstitution of tibial vessels.  He was placed on therapeutic heparin and evaluated by vascular surgery.     Today, S/p left lower extremity angiogram via right femoral access with findings of left popliteal artery, left PT and left AT distal occlusion.  Intervention included thrombectomy and balloon angioplasty resulting in dopplerable dorsalis pedis at the end of the procedure.  In recovery, patient developed hypotension and taut left thigh with associated pain.  Continued to have bilateral dopplerable DP, movement and sensation in left lower extremity.  He was given a liter of IV fluid with improvement in blood pressure however concerns for bleeding.  Hemoglobin dropped from 12.6-8.6 therefore was transfused 1 unit of packed red blood cells.  Request for transfer to ICU secondary to hypotension and acute blood loss anemia.  Interventionalist in route back to assess thigh with plans for stat CTA of left lower extremity.  Integrilin drip off.    Patient does endorse some left lower extremity pain on palpation of thigh but reports a good sensation of left lower extremity.  Denies chest pain, headache, lightheadedness or shortness of breath.      Medical History[1]  Surgical History[2]  Prescriptions Prior to Admission[3]  Patient has no known allergies.  Social History[4]  Family History[5]    Scheduled Medications:   Scheduled Medications[6]     Continuous Medications:   Continuous Medications[7]     PRN Medications:   PRN Medications[8]    Review of Systems   Respiratory:  Negative for cough, chest tightness and shortness of breath.     Cardiovascular:  Negative for chest pain.   Gastrointestinal:  Negative for abdominal pain, constipation and diarrhea.   Genitourinary:  Negative for dysuria.   Musculoskeletal:  Positive for myalgias.   All other systems reviewed and are negative.       Physical Exam  Constitutional:       Appearance: Normal appearance.   HENT:      Head: Normocephalic and atraumatic.      Mouth/Throat:      Pharynx: Oropharynx is clear.   Cardiovascular:      Rate and Rhythm: Normal rate and regular rhythm.      Pulses: Normal pulses.      Heart sounds: Normal heart sounds.      Comments: Bilateral DP dopplerable  Pulmonary:      Effort: Pulmonary effort is normal.      Breath sounds: Normal breath sounds.   Abdominal:      General: Bowel sounds are normal.      Palpations: Abdomen is soft.   Musculoskeletal:         General: Swelling and tenderness present. Normal range of motion.      Cervical back: Normal range of motion.      Comments: Left thigh taut and tender to palpation.    Skin:     General: Skin is dry.      Comments: L distal leg/foot cooler than R   Neurological:      General: No focal deficit present.      Mental Status: He is alert. Mental status is at baseline.         Objective   Vitals:  Most Recent:  Vitals:    07/02/25 1700   BP: 91/51   Pulse: 79   Resp: 14   Temp:    SpO2: 98%       24hr Min/Max:  Temp  Min: 36.1 °C (97 °F)  Max: 36.9 °C (98.4 °F)  Pulse  Min: 53  Max: 79  BP  Min: 89/48  Max: 200/87  Resp  Min: 14  Max: 18  SpO2  Min: 96 %  Max: 99 %         Hemodynamic parameters for last 24 hours:         Intake/Output Summary (Last 24 hours) at 7/2/2025 1802  Last data filed at 7/2/2025 1300  Gross per 24 hour   Intake 192.78 ml   Output 1350 ml   Net -1157.22 ml           Lab/Radiology/Diagnostic Review:  Results for orders placed or performed during the hospital encounter of 07/01/25 (from the past 24 hours)   Heparin Assay, UFH   Result Value Ref Range    Heparin Unfractionated 1.6 (HH) See Comment  Below for Therapeutic Ranges IU/mL   Heparin Assay, UFH   Result Value Ref Range    Heparin Unfractionated 1.1 (HH) See Comment Below for Therapeutic Ranges IU/mL   Heparin Assay, UFH   Result Value Ref Range    Heparin Unfractionated 0.6 See Comment Below for Therapeutic Ranges IU/mL   Lavender Top   Result Value Ref Range    Extra Tube Hold for add-ons.    PST Top   Result Value Ref Range    Extra Tube Hold for add-ons.    Heparin Assay   Result Value Ref Range    Heparin Unfractionated 1.1 (HH) See Comment Below for Therapeutic Ranges IU/mL   ACTIVATED CLOTTING TIME LOW   Result Value Ref Range    POCT Activated Clotting Time Low Range 281 (H) 83 - 199 sec   ACTIVATED CLOTTING TIME LOW   Result Value Ref Range    POCT Activated Clotting Time Low Range >397 (H) 83 - 199 sec   ACTIVATED CLOTTING TIME LOW   Result Value Ref Range    POCT Activated Clotting Time Low Range >397 (H) 83 - 199 sec   Blood Gas Venous Unsolicited   Result Value Ref Range    POCT pH, Venous 7.42 7.33 - 7.43 pH    POCT pCO2, Venous 30 (L) 41 - 51 mm Hg    POCT pO2, Venous 116 (H) 35 - 45 mm Hg    POCT SO2, Venous 99 (H) 45 - 75 %    POCT Oxy Hemoglobin, Venous 97.5 (H) 45.0 - 75.0 %    POCT Base Excess, Venous -3.9 (L) -2.0 - 3.0 mmol/L    POCT HCO3 Calculated, Venous 19.5 (L) 22.0 - 26.0 mmol/L    Patient Temperature     ACTIVATED CLOTTING TIME LOW   Result Value Ref Range    POCT Activated Clotting Time Low Range 279 (H) 83 - 199 sec   VERIFY ABO/Rh Group Test   Result Value Ref Range    ABO TYPE A     Rh TYPE POS    Blood Gas Arterial Full Panel Unsolicited   Result Value Ref Range    POCT pH, Arterial 7.40 7.38 - 7.42 pH    POCT pCO2, Arterial 32 (L) 38 - 42 mm Hg    POCT pO2, Arterial 125 (H) 85 - 95 mm Hg    POCT SO2, Arterial 99 94 - 100 %    POCT Oxy Hemoglobin, Arterial 97.1 94.0 - 98.0 %    POCT Hematocrit Calculated, Arterial 30.0 (L) 41.0 - 52.0 %    POCT Sodium, Arterial 135 (L) 136 - 145 mmol/L    POCT Potassium, Arterial 3.8  3.5 - 5.3 mmol/L    POCT Chloride, Arterial 109 (H) 98 - 107 mmol/L    POCT Ionized Calcium, Arterial 1.23 1.10 - 1.33 mmol/L    POCT Glucose, Arterial 143 (H) 74 - 99 mg/dL    POCT Lactate, Arterial 0.6 0.4 - 2.0 mmol/L    POCT Base Excess, Arterial -4.3 (L) -2.0 - 3.0 mmol/L    POCT HCO3 Calculated, Arterial 19.8 (L) 22.0 - 26.0 mmol/L    POCT Hemoglobin, Arterial 10.0 (L) 13.5 - 17.5 g/dL    POCT Anion Gap, Arterial 10 10 - 25 mmo/L    Patient Temperature      Site of Arterial Puncture A LINE     Cristopher's Test ALESHIA    ACTIVATED CLOTTING TIME LOW   Result Value Ref Range    POCT Activated Clotting Time Low Range 313 (H) 83 - 199 sec   ACTIVATED CLOTTING TIME LOW   Result Value Ref Range    POCT Activated Clotting Time Low Range 274 (H) 83 - 199 sec   CBC   Result Value Ref Range    WBC 15.4 (H) 4.4 - 11.3 x10*3/uL    nRBC 0.0 0.0 - 0.0 /100 WBCs    RBC 2.69 (L) 4.50 - 5.90 x10*6/uL    Hemoglobin 8.6 (L) 13.5 - 17.5 g/dL    Hematocrit 25.9 (L) 41.0 - 52.0 %    MCV 96 80 - 100 fL    MCH 32.0 26.0 - 34.0 pg    MCHC 33.2 32.0 - 36.0 g/dL    RDW 13.7 11.5 - 14.5 %    Platelets 156 150 - 450 x10*3/uL   Basic metabolic panel   Result Value Ref Range    Glucose 150 (H) 74 - 99 mg/dL    Sodium 139 136 - 145 mmol/L    Potassium 4.0 3.5 - 5.3 mmol/L    Chloride 109 (H) 98 - 107 mmol/L    Bicarbonate 20 (L) 21 - 32 mmol/L    Anion Gap 14 10 - 20 mmol/L    Urea Nitrogen 32 (H) 6 - 23 mg/dL    Creatinine 1.60 (H) 0.50 - 1.30 mg/dL    eGFR 42 (L) >60 mL/min/1.73m*2    Calcium 7.7 (L) 8.6 - 10.3 mg/dL   Creatine Kinase   Result Value Ref Range    Creatine Kinase 1,181 (H) 0 - 325 U/L   Prepare RBC: 1 Units   Result Value Ref Range    PRODUCT CODE G2284R50     Unit Number [id number]     Unit ABO A     Unit RH NEG     XM INTEP COMP     Dispense Status IS     Blood Expiration Date 7/9/2025 11:59:00 PM EDT     PRODUCT BLOOD TYPE 0600     UNIT VOLUME 268      Imaging  CT angio lower extremity left w and or wo IV contrast  Result  Date: 7/1/2025  The arterial system shows only minimal scattered mural plaquing on the left down through the superficial femoral artery.  The left popliteal artery shows some narrowing in its origin and then complete occlusion just above the level of the knee joint.  There is only minimal incomplete filling of the runoff vessels below the popliteal trifurcation level.. Signed by Markie Brito MD      Cardiology, Vascular, and Other Imaging  ECG 12 lead  Result Date: 7/2/2025  Sinus rhythm with 1st degree AV block with Premature atrial complexes Right bundle branch block Left anterior fascicular block ** Bifascicular block ** Septal infarct (cited on or before 01-JUL-2025) Abnormal ECG When compared with ECG of 08-JUN-2025 14:40, Premature atrial complexes are now Present Questionable change in initial forces of Septal leads Nonspecific T wave abnormality, worse in Lateral leads      Scheduled medications  Scheduled Medications[9]  Continuous medications  Continuous Medications[10]  PRN medications  PRN Medications[11]    Assessment & Plan  Acute lower limb ischemia  Neuro  -no acute issues  - Awake, alert and oriented  - Monitor CAM-ICU  -acetaminophen/dilaudid prn pain  - Continue home Effexor    Pulm  -No acute issues  - Oxygenating well on room air  - Encourage incentive spirometer    CVA  #Hypotension suspect secondary to ABL anemia  # Left popliteal, PT, AT artery occlusion  #Hx HTN  -s/p left lower extremity angiogram with thrombectomy and balloon angioplasty  -Will defer vascular check frequency to surgeon  -Stat CTA LLE  -Goal MAP > 65  -Fluid responsive therefore will closely monitor blood pressure and give IV fluid as necessary.  Packed red blood cells transfusing currently  -Obtain lactic acid  -Replete electrolytes per protocol  - Hold home lisinopril/HCTZ      GI  #acute urinary retention  -unable to void since this am. Bladder scan reveals apprx 425ml. If no void continues, will consider straight  cath  - N.p.o. for now until reevaluated by surgery  -miralax for bowel regimen      #Hx CKD 3  -Baseline Cr 1.4-1.6  -Monitor I/O    Heme  #ABL anemia  -H/H q4h  -Transfused 1u prbc  -Avoid heparin/lovenox for VTE  -Transfuse for HGB < 7    Endo  -No acute issues  -Goal blood glucose 110-180    ID  -No acute issues          I spent 60 minutes in the professional and overall care of this patient.         [1]   Past Medical History:  Diagnosis Date    Cancer (Multi)     Chronic kidney disease     Hypertension     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 90 tablet 3 6/30/2025 Morning    aspirin 81 mg EC tablet Take 1 tablet (81 mg) by mouth once daily.   6/30/2025 Morning    cholecalciferol (Vitamin D-3) 5,000 Units tablet Take 1 tablet (125 mcg) by mouth once daily.   6/30/2025 Morning    choline fenofibrate (Trilipix) 135 mg DR capsule TAKE 1 CAPSULE BY MOUTH ONCE DAILY 90 capsule 3 6/30/2025 Morning    cyanocobalamin (Vitamin B-12) 2,500 mcg tablet Take 1 tablet (2,500 mcg) by mouth once daily.   6/30/2025 Morning    fish oil concentrate (Omega-3) 120-180 mg capsule Take 1 capsule (1 g) by mouth once daily.   6/30/2025 Morning    lisinopriL-hydrochlorothiazide 20-25 mg tablet TAKE 1 AND 1/2 TABLETS BY MOUTH ONCE DAILY 135 tablet 2 7/1/2025 Morning    omeprazole (PriLOSEC) 20 mg DR capsule TAKE 1 CAPSULE BY MOUTH EVERY DAY 90 capsule 3 7/1/2025 Morning    simvastatin (Zocor) 10 mg tablet TAKE 1 TABLET BY MOUTH EVERY DAY 90 tablet 3 7/1/2025 Morning    venlafaxine XR  (Effexor-XR) 75 mg 24 hr capsule Take 1 capsule (75 mg) by mouth once daily for 15 days. Take with food. 15 capsule 0 7/1/2025 Morning    lidocaine (Lidoderm) 5 % patch Place 1 patch over 12 hours on the skin once daily. Apply to painful area 12 hours per day, remove for 12 hours. (Patient not taking: Reported on 6/17/2025) 30 patch 0     meloxicam (Mobic) 15 mg tablet TAKE 1 TABLET (15 MG) BY MOUTH ONCE DAILY AS NEEDED FOR MODERATE PAIN (4 - 6). 30 tablet 6 Unknown    methocarbamol (Robaxin) 500 mg tablet Take 1 tablet (500 mg) by mouth 4 times a day as needed for muscle spasms. 40 tablet 2 Unknown    valACYclovir (Valtrex) 1 gram tablet Take 1 tablet (1,000 mg) by mouth once daily as needed.   Unknown    venlafaxine XR (Effexor-XR) 150 mg 24 hr capsule TAKE 1 CAPSULE BY MOUTH ONCE DAILY. 90 capsule 3    [4]   Social History  Tobacco Use    Smoking status: Never    Smokeless tobacco: Never   Vaping Use    Vaping status: Never Used   Substance Use Topics    Alcohol use: Yes     Alcohol/week: 17.0 standard drinks of alcohol    Drug use: Never   [5]   Family History  Adopted: Yes   [6] allopurinol, 300 mg, oral, Daily  aspirin, 81 mg, oral, Daily  atorvastatin, 80 mg, oral, Nightly  fenofibrate, 160 mg, oral, Daily  [Held by provider] lisinopril 20 mg, hydroCHLOROthiazide 25 mg for Zestoretic/Prinizide, , oral, Daily  melatonin, 3 mg, oral, Nightly  pantoprazole, 40 mg, oral, Daily before breakfast  polyethylene glycol, 17 g, oral, BID  venlafaxine XR, 75 mg, oral, Daily    [7]    [8] PRN medications: acetaminophen **OR** acetaminophen **OR** acetaminophen, alteplase, ondansetron **OR** ondansetron  [9] allopurinol, 300 mg, oral, Daily  aspirin, 81 mg, oral, Daily  atorvastatin, 80 mg, oral, Nightly  fenofibrate, 160 mg, oral, Daily  [Held by provider] lisinopril 20 mg, hydroCHLOROthiazide 25 mg for Zestoretic/Prinizide, , oral, Daily  melatonin, 3 mg, oral, Nightly  pantoprazole, 40 mg, oral, Daily before  breakfast  polyethylene glycol, 17 g, oral, BID  venlafaxine XR, 75 mg, oral, Daily  [10]    [11] PRN medications: acetaminophen **OR** acetaminophen **OR** acetaminophen, alteplase, ondansetron **OR** ondansetron

## 2025-07-02 NOTE — POST-PROCEDURE NOTE
Physician Transition of Care Summary  Invasive Cardiovascular Lab    Procedure Date: 7/2/2025  Attending:    * Sonia Clancy - Primary  Resident/Fellow/Other Assistant: Surgeons and Role:  * No surgeons found with a matching role *    Indications:   Pre-op Diagnosis      * Acute lower limb ischemia [I99.8]    Post-procedure diagnosis:   Post-op Diagnosis     * Acute lower limb ischemia [I99.8]    Procedure(s):   Lower Extremity Angiogram  04345 - CHG ANGIOGRAPHY EXTREMITY UNILATERAL RS&I    Angioplasty - Lower Extremity        Procedure Findings:   L popliteal artery occlusion  L PT occlusion  L AT distal occlusion    Description of the Procedure:   Left lower extremity angiogram and intervention via right femoral access  Thrombectomy to left popliteal artery and anterior tibial artery  Balloon angioplasty to AT and TPT  Drug coated balloon angioplasty to distal SFA/pop  Restoration of 2 vessel runoff to foot AT and peronal  Patient had dopplerable DP signal at end of procedure.     Complications:   None    Stents/Implants:   Implants       No implant documentation for this case.            Anticoagulation/Antiplatelet Plan:   Start heparin drip at same rate in 4 hrs  Integrilin drip started in lab to be continued for 12 hrs  Continue neurovascular and compartment checks    Estimated Blood Loss:   500 mL    Anesthesia: Moderate Sedation Anesthesia Staff: No anesthesia staff entered.    Any Specimen(s) Removed:   Order Name Source Comment Collection Info Order Time   BLOOD GAS VENOUS FULL PANEL Blood, Venous  Collected By: Brenda Carballo RN 7/2/2025 10:43 AM     Release result to [email or web address]   Immediate        VERAB/VERIFY ABORH Blood, Venous **This is for confirming/verifying history of ABORh on file for transfusion of blood products. If this is not for transfusion, please order an ABO/RH [[id number]]. If you have any questions or unsure what to order, please call the blood bank.** Collected By: Brenda  CLAY Carballo 7/2/2025 11:22 AM     Release result to [email or web address]   Immediate            Disposition:   ICU      Electronically signed by: Sonia Clancy MD, 7/2/2025 1:11 PM

## 2025-07-02 NOTE — H&P
History Of Present Illness  Jamar Gill is a 85 y.o. male presenting with increasing left lower extremity pain over the last week, had rest pain that was quite severe the previous evening that made it difficult to sleep.  Prior to this he denies any claudications in that limb, denies prior history of arterial thrombus or peripheral arterial disease.  Does have a history of hypertension and dyslipidemia..    CTA in the ED showing a popliteal occlusion with reconstitution of the tibial vessels.  Evaluated by vascular surgery in the ED, placed on therapeutic heparin per their recommendations with a plan for intervention tomorrow morning.       PMH: Hemochromatosis with cirrhosis, dyslipidemia, primary hypertension, COPD, CKD 3B, cervical radiculopathy, GERD, gout  PSX: Basal cell carcinoma excision, tonsillectomy  FamHx: Denies  SocHx: Never smoker, daily drinker 2-5 drinks/day, no history of withdrawals     Code Status: full code     Medical History[1]  Surgical History[2]  Family History[3]  Social History[4]     Allergies  Patient has no known allergies.      Physical Exam  Constitutional: Awake/alert/oriented x3  Eyes: Clear sclera  Head/Neck: Normocephalic, atraumatic  Respiratory/Thorax: CTAB  Cardiovascular: RRR  Gastrointestinal: Non-TTP  Musculoskeletal: FROM  Extremities: left lower extremity cool to the touch, full range of movement in the leg, denying rest pain  Psychological: Appropriate mood and behavior  Skin: Warm and dry, no jaundice     Last Recorded Vitals  Blood pressure 162/74, pulse 78, temperature 36.4 °C (97.5 °F), temperature source Temporal, resp. rate 18, height 1.829 m (6'), weight 88 kg (194 lb), SpO2 99%.    Relevant Results    Results for orders placed during the hospital encounter of 07/01/25    CT angio lower extremity left w and or wo IV contrast    Narrative  STUDY:  CT CTA LOWER EXTREMITY LEFT; 7/1/2025 16:32  INDICATION:  No pulses in the left lower  extremity.  COMPARISON:  None Available.  ACCESSION NUMBER(S):  [id number]  ORDERING CLINICIAN:  ROME BURCH  TECHNIQUE:  Helical CT is performed from the infrarenal aorta through  the feet after bolus administration of 90 mL of Omnipaque 350.  Images  are reviewed and processed at a workstation according to the CT  angiogram protocol with 3-D and/or MIP post processing imaging  generated.  Automated mA/kV exposure control was utilized and patient examination  was performed in strict accordance with principles of ALARA.  FINDINGS:  Vascular:  Infrarenal Aorta:  The infrarenal aorta is not aneurysmal and demonstrates no significant  occlusive disease.  There is some mild scattered mural calcific  plaquing in the infrarenal aorta.  Right Run-off:  The common, external, and internal iliac vessels demonstrate no  aneurysm or significant stenosis.  There is some minimal scattered  mural calcification in the common iliac artery.  Left Run-off:  The common, external, and internal iliac vessels demonstrate no  aneurysm or significant stenosis.There is some minimal scattered mural  calcification in the common iliac artery.  The left femoral popliteal system shows  only some mild calcific and  noncalcific plaquing in the distal superficial femoral artery.  In the proximal left popliteal artery there is a short segment of  about 50% stenosis but then there is complete occlusion of the  popliteal artery just above the level of the knee joint.  Below the  popliteal trifurcation there is some minimal filling of the runoff  vessels in the proximal calf but no significant arterial opacification  is seen in the distal calf..  Pelvis:  There is no bowel wall thickening or obstruction.  There is no free  fluid.  Lymph nodes are not enlarged.  Urinary bladder is  unremarkable.  Skeleton:  There are no acute fractures.  No suspicious bony lesions.    Impression  The arterial system shows only minimal scattered mural plaquing on  the  left down through the superficial femoral artery.  The left popliteal  artery shows some narrowing in its origin and then complete occlusion  just above the level of the knee joint.  There is only minimal  incomplete filling of the runoff vessels below the popliteal  trifurcation level..  Signed by Markie Brito MD      Assessment/Plan    Assessment & Plan  Acute lower limb ischemia    Evaluated by vascular surgery in the ED, placed on therapeutic heparin, plan for angioplasty/stenting/thrombectomy tomorrow morning  N.p.o. after midnight  Every 2 neurovascular checks  Atorvastatin and aspirin    Primary hypertension  Continue home lisinopril/hydrochlorothiazide      Dispo: Home on d/c. LOS > 2 MN    Code Status: Full Code    Plan of care was discussed extensively with patient. Patient verbalized understanding through teach back method. All questions and concerns addressed upon examination.   Aakash Mckay DO  Complexity: Moderate  ###Of note, this documentation is completed using the Dragon Dictation system (voice recognition software). There may be spelling and/or grammatical errors that were not corrected prior to final submission.**         [1]   Past Medical History:  Diagnosis Date    Cancer (Multi)     Chronic kidney disease     Hypertension     Other disorders of iron metabolism 09/30/2021    Increased storage iron    Personal history of other endocrine, nutritional and metabolic disease 05/10/2021    History of hyperlipidemia   [2]   Past Surgical History:  Procedure Laterality Date    OTHER SURGICAL HISTORY  10/17/2019    Tonsillectomy with adenoidectomy   [3]   Family History  Adopted: Yes   [4]   Social History  Tobacco Use    Smoking status: Never    Smokeless tobacco: Never   Vaping Use    Vaping status: Never Used   Substance Use Topics    Alcohol use: Yes     Alcohol/week: 17.0 standard drinks of alcohol    Drug use: Never

## 2025-07-02 NOTE — NURSING NOTE
Left thigh swollen, painful, Dr Coleman and Dr Leal aware, blood infusing well. No reactions noted.  + doppler Dp bilat

## 2025-07-02 NOTE — CONSULTS
ENDOVASCULAR/LIMB SALVAGE CONSULT NOTE    Reason for Consult: popliteal occlusion     History of Present Illness  Jamar Gill is a 85 y.o. male with history of hypertension and hyperlipidemia who is presenting to the emergency room with a 1 week history of left lower extremity pain and numbness.  Patient reports noticing over the last week intermittent soreness in the left foot as well as pain.  He reports that over the last few days he will notice pain in his left foot when sleeping at night.  Last night he was unable to sleep due to the pain.      Last Recorded Vitals:  Vitals:    07/01/25 1240 07/01/25 1704   BP: 175/77 162/74   BP Location:  Left arm   Patient Position:  Sitting   Pulse: 77 78   Resp: 18 18   Temp: 36.4 °C (97.5 °F)    TempSrc: Temporal    SpO2: 100% 99%   Weight: 88 kg (194 lb)    Height: 1.829 m (6')          Physical Exam  General: NAD, well-appearing  HEENT: moist mucous membranes, no jaundice  Neck: No JVD, no carotid bruit  Lungs: CTA tyree, no wheezing or rales  Cardiac: RRR, no murmurs  Abdomen: soft, non-tender, non-distended  Extremities: 2+ radial pulses, left foot cool to touch, no dopplerable signals, decreased sensation in the toes and the plantar aspect of the foot, normal strength  Skin: warm, dry  Neurologic: AAOx3,  no focal deficits        Assessment/Plan     Assessment  -Acute limb ischemia, [place] 2a    Plan:    -CTA reviewed in detail showing mid popliteal occlusion with some faint reconstitution of the tibial vessels  - Will plan for left lower extremity thrombectomy plus minus angioplasty/stenting.  Benefits and risks were discussed with patient and son and they agreed to proceed  - Continue heparin drip.  Keep n.p.o. after midnight  - Continue close neurovascular checks every 2 hours.  Please call me if any changes in his exam        Sonia Clancy MD

## 2025-07-02 NOTE — SIGNIFICANT EVENT
Patient Name: Jamar Gill   YOB: 1940    ICU Upgrade // transfer  Patient admitted overnight, after thrombectomy, he has been more hypotensive while on integrellin, he was complaining of some pain in the left thigh area; writer came to evaluate, repeat labs collected showing worsened hypotension; patient stating he feels well overall, but has taut thigh on left, access site in Right groin appears well, intact pulses distally by doppler.  Will administer unit pRBC, add CK to labs, check stat CTA of the left leg; updated vascular interventionalist, patient to transfer to ICU for closer monitoring.  Integrellin has been stopped.  Patient and son updated.     BP 98/51   Pulse 76   Temp 36.4 °C (97.5 °F)   Resp 16   Ht 1.829 m (6')   Wt 88 kg (194 lb)   SpO2 98%   BMI 26.31 kg/m²        Scheduled medications  Scheduled Medications[1]  Continuous medications  Continuous Medications[2]  PRN medications  PRN Medications[3]     Lab Results   Component Value Date    WBC 15.4 (H) 07/02/2025    HGB 8.6 (L) 07/02/2025    HCT 25.9 (L) 07/02/2025    MCV 96 07/02/2025     07/02/2025     Lab Results   Component Value Date    GLUCOSE 150 (H) 07/02/2025    CALCIUM 7.7 (L) 07/02/2025     07/02/2025    K 4.0 07/02/2025    CO2 20 (L) 07/02/2025     (H) 07/02/2025    BUN 32 (H) 07/02/2025    CREATININE 1.60 (H) 07/02/2025     Oumar Coleman MD  Formerly [facility] Medicine    This document was generated in whole or in part using the Dragon One medical voice recognition software and there may be some incorrect words/wording, spelling, or punctuation errors that were not corrected prior to finalization in the medical record.           [1] allopurinol, 300 mg, oral, Daily  aspirin, 81 mg, oral, Daily  atorvastatin, 80 mg, oral, Nightly  fenofibrate, 160 mg, oral, Daily  lisinopril 20 mg, hydroCHLOROthiazide 25 mg for Zestoretic/Prinizide, , oral, Daily  melatonin, 3  mg, oral, Nightly  pantoprazole, 40 mg, oral, Daily before breakfast  polyethylene glycol, 17 g, oral, BID  venlafaxine XR, 75 mg, oral, Daily  [2]    [3] PRN medications: acetaminophen **OR** acetaminophen **OR** acetaminophen, alteplase, ondansetron **OR** ondansetron

## 2025-07-02 NOTE — ASSESSMENT & PLAN NOTE
Neuro  -no acute issues  - Awake, alert and oriented  - Monitor CAM-ICU  -acetaminophen/dilaudid prn pain  - Continue home Effexor    Pulm  -No acute issues  - Oxygenating well on room air  - Encourage incentive spirometer    CVA  #Hypotension suspect secondary to ABL anemia  # Left popliteal, PT, AT artery occlusion  #Hx HTN  -s/p left lower extremity angiogram with thrombectomy and balloon angioplasty  -Will defer vascular check frequency to surgeon  -Stat CTA LLE  -Goal MAP > 65  -Fluid responsive therefore will closely monitor blood pressure and give IV fluid as necessary.  Packed red blood cells transfusing currently  -Obtain lactic acid  -Replete electrolytes per protocol  - Hold home lisinopril/HCTZ      GI  #acute urinary retention  -unable to void since this am. Bladder scan reveals apprx 425ml. If no void continues, will consider straight cath  - N.p.o. for now until reevaluated by surgery  -miralax for bowel regimen      #Hx CKD 3  -Baseline Cr 1.4-1.6  -Monitor I/O    Heme  #ABL anemia  -H/H q4h  -Transfused 1u prbc  -Avoid heparin/lovenox for VTE  -Transfuse for HGB < 7    Endo  -No acute issues  -Goal blood glucose 110-180    ID  -No acute issues

## 2025-07-03 ENCOUNTER — APPOINTMENT (OUTPATIENT)
Dept: CARDIOLOGY | Facility: HOSPITAL | Age: 85
End: 2025-07-03
Payer: MEDICARE

## 2025-07-03 LAB
ALBUMIN SERPL BCP-MCNC: 3 G/DL (ref 3.4–5)
ALP SERPL-CCNC: 29 U/L (ref 33–136)
ALT SERPL W P-5'-P-CCNC: 24 U/L (ref 10–52)
ANION GAP SERPL CALC-SCNC: 12 MMOL/L (ref 10–20)
ANION GAP SERPL CALC-SCNC: 14 MMOL/L (ref 10–20)
AST SERPL W P-5'-P-CCNC: 57 U/L (ref 9–39)
BILIRUB SERPL-MCNC: 0.5 MG/DL (ref 0–1.2)
BLOOD EXPIRATION DATE: NORMAL
BUN SERPL-MCNC: 37 MG/DL (ref 6–23)
BUN SERPL-MCNC: 39 MG/DL (ref 6–23)
CALCIUM SERPL-MCNC: 7.6 MG/DL (ref 8.6–10.3)
CALCIUM SERPL-MCNC: 7.8 MG/DL (ref 8.6–10.3)
CHLORIDE SERPL-SCNC: 109 MMOL/L (ref 98–107)
CHLORIDE SERPL-SCNC: 109 MMOL/L (ref 98–107)
CK SERPL-CCNC: 1694 U/L (ref 0–325)
CK SERPL-CCNC: 2264 U/L (ref 0–325)
CK SERPL-CCNC: 2492 U/L (ref 0–325)
CK SERPL-CCNC: 2885 U/L (ref 0–325)
CO2 SERPL-SCNC: 20 MMOL/L (ref 21–32)
CO2 SERPL-SCNC: 22 MMOL/L (ref 21–32)
CREAT SERPL-MCNC: 1.7 MG/DL (ref 0.5–1.3)
CREAT SERPL-MCNC: 1.75 MG/DL (ref 0.5–1.3)
DISPENSE STATUS: NORMAL
EGFRCR SERPLBLD CKD-EPI 2021: 38 ML/MIN/1.73M*2
EGFRCR SERPLBLD CKD-EPI 2021: 40 ML/MIN/1.73M*2
ERYTHROCYTE [DISTWIDTH] IN BLOOD BY AUTOMATED COUNT: 15 % (ref 11.5–14.5)
ERYTHROCYTE [DISTWIDTH] IN BLOOD BY AUTOMATED COUNT: 15.2 % (ref 11.5–14.5)
ERYTHROCYTE [DISTWIDTH] IN BLOOD BY AUTOMATED COUNT: 15.3 % (ref 11.5–14.5)
GLUCOSE BLD MANUAL STRIP-MCNC: 109 MG/DL (ref 74–99)
GLUCOSE BLD MANUAL STRIP-MCNC: 109 MG/DL (ref 74–99)
GLUCOSE BLD MANUAL STRIP-MCNC: 121 MG/DL (ref 74–99)
GLUCOSE SERPL-MCNC: 108 MG/DL (ref 74–99)
GLUCOSE SERPL-MCNC: 113 MG/DL (ref 74–99)
HCT VFR BLD AUTO: 23.2 % (ref 41–52)
HCT VFR BLD AUTO: 23.4 % (ref 41–52)
HCT VFR BLD AUTO: 23.8 % (ref 41–52)
HGB BLD-MCNC: 7.7 G/DL (ref 13.5–17.5)
HGB BLD-MCNC: 7.7 G/DL (ref 13.5–17.5)
HGB BLD-MCNC: 7.9 G/DL (ref 13.5–17.5)
HOLD SPECIMEN: NORMAL
MAGNESIUM SERPL-MCNC: 1.11 MG/DL (ref 1.6–2.4)
MAGNESIUM SERPL-MCNC: 1.53 MG/DL (ref 1.6–2.4)
MCH RBC QN AUTO: 31 PG (ref 26–34)
MCH RBC QN AUTO: 31.3 PG (ref 26–34)
MCH RBC QN AUTO: 31.3 PG (ref 26–34)
MCHC RBC AUTO-ENTMCNC: 32.9 G/DL (ref 32–36)
MCHC RBC AUTO-ENTMCNC: 33.2 G/DL (ref 32–36)
MCHC RBC AUTO-ENTMCNC: 33.2 G/DL (ref 32–36)
MCV RBC AUTO: 94 FL (ref 80–100)
MCV RBC AUTO: 94 FL (ref 80–100)
MCV RBC AUTO: 95 FL (ref 80–100)
NRBC BLD-RTO: 0 /100 WBCS (ref 0–0)
PHOSPHATE SERPL-MCNC: 5.2 MG/DL (ref 2.5–4.9)
PLATELET # BLD AUTO: 134 X10*3/UL (ref 150–450)
PLATELET # BLD AUTO: 141 X10*3/UL (ref 150–450)
PLATELET # BLD AUTO: 143 X10*3/UL (ref 150–450)
POTASSIUM SERPL-SCNC: 3.8 MMOL/L (ref 3.5–5.3)
POTASSIUM SERPL-SCNC: 4 MMOL/L (ref 3.5–5.3)
PRODUCT BLOOD TYPE: 600
PRODUCT CODE: NORMAL
PROT SERPL-MCNC: 4.6 G/DL (ref 6.4–8.2)
RBC # BLD AUTO: 2.46 X10*6/UL (ref 4.5–5.9)
RBC # BLD AUTO: 2.48 X10*6/UL (ref 4.5–5.9)
RBC # BLD AUTO: 2.52 X10*6/UL (ref 4.5–5.9)
SODIUM SERPL-SCNC: 139 MMOL/L (ref 136–145)
SODIUM SERPL-SCNC: 139 MMOL/L (ref 136–145)
UNIT ABO: NORMAL
UNIT NUMBER: NORMAL
UNIT RH: NORMAL
UNIT VOLUME: 268
WBC # BLD AUTO: 10.4 X10*3/UL (ref 4.4–11.3)
WBC # BLD AUTO: 13.8 X10*3/UL (ref 4.4–11.3)
WBC # BLD AUTO: 9.8 X10*3/UL (ref 4.4–11.3)
XM INTEP: NORMAL

## 2025-07-03 PROCEDURE — 83735 ASSAY OF MAGNESIUM: CPT

## 2025-07-03 PROCEDURE — 2020000001 HC ICU ROOM DAILY

## 2025-07-03 PROCEDURE — 80053 COMPREHEN METABOLIC PANEL: CPT

## 2025-07-03 PROCEDURE — 85027 COMPLETE CBC AUTOMATED: CPT

## 2025-07-03 PROCEDURE — 93922 UPR/L XTREMITY ART 2 LEVELS: CPT | Performed by: INTERNAL MEDICINE

## 2025-07-03 PROCEDURE — 2500000001 HC RX 250 WO HCPCS SELF ADMINISTERED DRUGS (ALT 637 FOR MEDICARE OP): Performed by: INTERNAL MEDICINE

## 2025-07-03 PROCEDURE — 93922 UPR/L XTREMITY ART 2 LEVELS: CPT

## 2025-07-03 PROCEDURE — 2500000005 HC RX 250 GENERAL PHARMACY W/O HCPCS: Performed by: INTERNAL MEDICINE

## 2025-07-03 PROCEDURE — 2500000004 HC RX 250 GENERAL PHARMACY W/ HCPCS (ALT 636 FOR OP/ED): Performed by: INTERNAL MEDICINE

## 2025-07-03 PROCEDURE — 2500000002 HC RX 250 W HCPCS SELF ADMINISTERED DRUGS (ALT 637 FOR MEDICARE OP, ALT 636 FOR OP/ED): Performed by: INTERNAL MEDICINE

## 2025-07-03 PROCEDURE — 36415 COLL VENOUS BLD VENIPUNCTURE: CPT

## 2025-07-03 PROCEDURE — 2500000004 HC RX 250 GENERAL PHARMACY W/ HCPCS (ALT 636 FOR OP/ED)

## 2025-07-03 PROCEDURE — 99232 SBSQ HOSP IP/OBS MODERATE 35: CPT | Performed by: NURSE PRACTITIONER

## 2025-07-03 PROCEDURE — 82550 ASSAY OF CK (CPK): CPT

## 2025-07-03 PROCEDURE — 84100 ASSAY OF PHOSPHORUS: CPT

## 2025-07-03 PROCEDURE — 82947 ASSAY GLUCOSE BLOOD QUANT: CPT

## 2025-07-03 PROCEDURE — 80048 BASIC METABOLIC PNL TOTAL CA: CPT | Mod: CCI

## 2025-07-03 PROCEDURE — 99291 CRITICAL CARE FIRST HOUR: CPT

## 2025-07-03 RX ORDER — DEXTROSE 50 % IN WATER (D50W) INTRAVENOUS SYRINGE
12.5
Status: ACTIVE | OUTPATIENT
Start: 2025-07-03

## 2025-07-03 RX ORDER — MAGNESIUM SULFATE HEPTAHYDRATE 40 MG/ML
2 INJECTION, SOLUTION INTRAVENOUS ONCE
Status: COMPLETED | OUTPATIENT
Start: 2025-07-03 | End: 2025-07-03

## 2025-07-03 RX ORDER — DEXTROSE 50 % IN WATER (D50W) INTRAVENOUS SYRINGE
25
Status: ACTIVE | OUTPATIENT
Start: 2025-07-03

## 2025-07-03 RX ADMIN — POLYETHYLENE GLYCOL 3350 17 G: 17 POWDER, FOR SOLUTION ORAL at 07:59

## 2025-07-03 RX ADMIN — PANTOPRAZOLE SODIUM 40 MG: 40 TABLET, DELAYED RELEASE ORAL at 06:15

## 2025-07-03 RX ADMIN — FENOFIBRATE 160 MG: 160 TABLET ORAL at 07:59

## 2025-07-03 RX ADMIN — ASPIRIN 81 MG: 81 TABLET, COATED ORAL at 07:58

## 2025-07-03 RX ADMIN — Medication 3 MG: at 21:26

## 2025-07-03 RX ADMIN — ALLOPURINOL 300 MG: 300 TABLET ORAL at 08:00

## 2025-07-03 RX ADMIN — MAGNESIUM SULFATE HEPTAHYDRATE 2 G: 40 INJECTION, SOLUTION INTRAVENOUS at 06:30

## 2025-07-03 RX ADMIN — VENLAFAXINE HYDROCHLORIDE 75 MG: 75 CAPSULE, EXTENDED RELEASE ORAL at 08:00

## 2025-07-03 RX ADMIN — POLYETHYLENE GLYCOL 3350 17 G: 17 POWDER, FOR SOLUTION ORAL at 21:26

## 2025-07-03 RX ADMIN — POLYETHYLENE GLYCOL 3350 17 G: 17 POWDER, FOR SOLUTION ORAL at 08:00

## 2025-07-03 ASSESSMENT — PAIN - FUNCTIONAL ASSESSMENT
PAIN_FUNCTIONAL_ASSESSMENT: 0-10

## 2025-07-03 ASSESSMENT — PAIN SCALES - GENERAL
PAINLEVEL_OUTOF10: 0 - NO PAIN

## 2025-07-03 NOTE — PROGRESS NOTES
Spiritual Care Visit  Spiritual Care Request    Reason for Visit:  Routine Visit: Introduction     Request Received From:       Focus of Care:  Visited With: Patient         Refer to :          Spiritual Care Assessment    Spiritual Assessment:                      Care Provided:  Intended Effects: Promote sense of peace, Preserve dignity and respect, Meaning-making  Methods: Offer spiritual/Alevism support  Interventions: Share words of hope and inspiration, Com[place]    Sense of Community and or Faith Affiliation:  Presbyterian         Addressed Needs/Concerns and/or Juanjo Through:          Outcome:        Advance Directives:         Spiritual Care Annotation    Annotation:  Patient shared his story and talked about his son who recently retired as a .   was supportive and prayed.

## 2025-07-03 NOTE — PROGRESS NOTES
Critical Care Medicine       Date:  7/3/2025  Patient:  Jamar Gill  YOB: 1940  MRN:  [id number]   Admit Date:  7/1/2025    Chief Complaint   Patient presents with    Leg Pain     Left, foot numb, pain with walking, poor pulses to extremity, seen by neurologist today and pcp also referred to ED.        Patient is a 85 y.o. male admitted on 7/1/2025 12:46 PM with the following indication(s) for ICU care s/p LLE throbectomy and balloon angioplasty, hypotensive after sx and acute blood loss anemia.   Hospital day 2 ICU day 14h     Interval ICU Events:  7/3: No acute events overnight.  HDS. Palpable DP pulse to LLE.  Sensation intact.  Hg 7.9 this AM. Left thigh less taut than yesterday per patient without pain currently.     Medical History:  Medical History[1]  Surgical History[2]  Prescriptions Prior to Admission[3]  Patient has no known allergies.  Social History[4]  Family History[5]    Hospital Medications:    Continuous Medications[6]    Current Medications[7]    Review of Systems:  14 point review of systems was completed and negative except for those specially mention in my HPI    Physical Exam:    Heart Rate:  []   Temp:  [36.1 °C (97 °F)-36.8 °C (98.2 °F)]   Resp:  [10-31]   BP: ()/(44-94)   Weight:  [92.5 kg (203 lb 14.8 oz)-96.5 kg (212 lb 11.9 oz)]   SpO2:  [94 %-100 %]     Physical Exam  Constitutional:       General: He is not in acute distress.  HENT:      Head: Normocephalic and atraumatic.   Eyes:      Extraocular Movements: Extraocular movements intact.      Conjunctiva/sclera: Conjunctivae normal.   Cardiovascular:      Rate and Rhythm: Normal rate and regular rhythm.      Pulses:           Radial pulses are 2+ on the right side and 2+ on the left side.        Dorsalis pedis pulses are 2+ on the right side and 2+ on the left side.   Abdominal:      General: Bowel sounds are normal.      Palpations: Abdomen is soft.   Musculoskeletal:      Cervical back: Normal range  of motion.      Right lower leg: No edema.      Left lower leg: No edema.      Comments: 5/5 strength to all extremities. Left thigh taut.   Skin:     General: Skin is warm and dry.      Capillary Refill: Capillary refill takes less than 2 seconds.      Comments: Rt groin cath site dry and intact, tegaderm in place   Neurological:      Mental Status: He is alert and oriented to person, place, and time.         Objective:    I have reviewed all medications, laboratory results, and imaging pertinent for today's encounter.           Intake/Output Summary (Last 24 hours) at 7/3/2025 0744  Last data filed at 7/3/2025 0400  Gross per 24 hour   Intake 997.92 ml   Output 750 ml   Net 247.92 ml       Results for orders placed or performed during the hospital encounter of 07/01/25 (from the past 24 hours)   ACTIVATED CLOTTING TIME LOW   Result Value Ref Range    POCT Activated Clotting Time Low Range 281 (H) 83 - 199 sec   ACTIVATED CLOTTING TIME LOW   Result Value Ref Range    POCT Activated Clotting Time Low Range >397 (H) 83 - 199 sec   ACTIVATED CLOTTING TIME LOW   Result Value Ref Range    POCT Activated Clotting Time Low Range >397 (H) 83 - 199 sec   Blood Gas Venous Unsolicited   Result Value Ref Range    POCT pH, Venous 7.42 7.33 - 7.43 pH    POCT pCO2, Venous 30 (L) 41 - 51 mm Hg    POCT pO2, Venous 116 (H) 35 - 45 mm Hg    POCT SO2, Venous 99 (H) 45 - 75 %    POCT Oxy Hemoglobin, Venous 97.5 (H) 45.0 - 75.0 %    POCT Base Excess, Venous -3.9 (L) -2.0 - 3.0 mmol/L    POCT HCO3 Calculated, Venous 19.5 (L) 22.0 - 26.0 mmol/L    Patient Temperature     ACTIVATED CLOTTING TIME LOW   Result Value Ref Range    POCT Activated Clotting Time Low Range 279 (H) 83 - 199 sec   VERIFY ABO/Rh Group Test   Result Value Ref Range    ABO TYPE A     Rh TYPE POS    Blood Gas Arterial Full Panel Unsolicited   Result Value Ref Range    POCT pH, Arterial 7.40 7.38 - 7.42 pH    POCT pCO2, Arterial 32 (L) 38 - 42 mm Hg    POCT pO2, Arterial  125 (H) 85 - 95 mm Hg    POCT SO2, Arterial 99 94 - 100 %    POCT Oxy Hemoglobin, Arterial 97.1 94.0 - 98.0 %    POCT Hematocrit Calculated, Arterial 30.0 (L) 41.0 - 52.0 %    POCT Sodium, Arterial 135 (L) 136 - 145 mmol/L    POCT Potassium, Arterial 3.8 3.5 - 5.3 mmol/L    POCT Chloride, Arterial 109 (H) 98 - 107 mmol/L    POCT Ionized Calcium, Arterial 1.23 1.10 - 1.33 mmol/L    POCT Glucose, Arterial 143 (H) 74 - 99 mg/dL    POCT Lactate, Arterial 0.6 0.4 - 2.0 mmol/L    POCT Base Excess, Arterial -4.3 (L) -2.0 - 3.0 mmol/L    POCT HCO3 Calculated, Arterial 19.8 (L) 22.0 - 26.0 mmol/L    POCT Hemoglobin, Arterial 10.0 (L) 13.5 - 17.5 g/dL    POCT Anion Gap, Arterial 10 10 - 25 mmo/L    Patient Temperature      Site of Arterial Puncture A LINE     Cristopher's Test ALESHIA    ACTIVATED CLOTTING TIME LOW   Result Value Ref Range    POCT Activated Clotting Time Low Range 313 (H) 83 - 199 sec   ACTIVATED CLOTTING TIME LOW   Result Value Ref Range    POCT Activated Clotting Time Low Range 274 (H) 83 - 199 sec   CBC   Result Value Ref Range    WBC 15.4 (H) 4.4 - 11.3 x10*3/uL    nRBC 0.0 0.0 - 0.0 /100 WBCs    RBC 2.69 (L) 4.50 - 5.90 x10*6/uL    Hemoglobin 8.6 (L) 13.5 - 17.5 g/dL    Hematocrit 25.9 (L) 41.0 - 52.0 %    MCV 96 80 - 100 fL    MCH 32.0 26.0 - 34.0 pg    MCHC 33.2 32.0 - 36.0 g/dL    RDW 13.7 11.5 - 14.5 %    Platelets 156 150 - 450 x10*3/uL   Basic metabolic panel   Result Value Ref Range    Glucose 150 (H) 74 - 99 mg/dL    Sodium 139 136 - 145 mmol/L    Potassium 4.0 3.5 - 5.3 mmol/L    Chloride 109 (H) 98 - 107 mmol/L    Bicarbonate 20 (L) 21 - 32 mmol/L    Anion Gap 14 10 - 20 mmol/L    Urea Nitrogen 32 (H) 6 - 23 mg/dL    Creatinine 1.60 (H) 0.50 - 1.30 mg/dL    eGFR 42 (L) >60 mL/min/1.73m*2    Calcium 7.7 (L) 8.6 - 10.3 mg/dL   Creatine Kinase   Result Value Ref Range    Creatine Kinase 1,181 (H) 0 - 325 U/L   Prepare RBC: 1 Units   Result Value Ref Range    PRODUCT CODE [id number]     Unit Number  [id number]     Unit ABO A     Unit RH NEG     XM INTEP COMP     Dispense Status IS     Blood Expiration Date 7/9/2025 11:59:00 PM EDT     PRODUCT BLOOD TYPE 0600     UNIT VOLUME 268    CBC and Auto Differential   Result Value Ref Range    WBC 19.2 (H) 4.4 - 11.3 x10*3/uL    nRBC 0.0 0.0 - 0.0 /100 WBCs    RBC 3.13 (L) 4.50 - 5.90 x10*6/uL    Hemoglobin 9.8 (L) 13.5 - 17.5 g/dL    Hematocrit 30.4 (L) 41.0 - 52.0 %    MCV 97 80 - 100 fL    MCH 31.3 26.0 - 34.0 pg    MCHC 32.2 32.0 - 36.0 g/dL    RDW 14.4 11.5 - 14.5 %    Platelets 162 150 - 450 x10*3/uL    Neutrophils % 86.5 40.0 - 80.0 %    Immature Granulocytes %, Automated 0.4 0.0 - 0.9 %    Lymphocytes % 8.2 13.0 - 44.0 %    Monocytes % 4.8 2.0 - 10.0 %    Eosinophils % 0.0 0.0 - 6.0 %    Basophils % 0.1 0.0 - 2.0 %    Neutrophils Absolute 16.62 (H) 1.60 - 5.50 x10*3/uL    Immature Granulocytes Absolute, Automated 0.07 0.00 - 0.50 x10*3/uL    Lymphocytes Absolute 1.57 0.80 - 3.00 x10*3/uL    Monocytes Absolute 0.93 (H) 0.05 - 0.80 x10*3/uL    Eosinophils Absolute 0.00 0.00 - 0.40 x10*3/uL    Basophils Absolute 0.02 0.00 - 0.10 x10*3/uL   Lactate   Result Value Ref Range    Lactate 2.3 (H) 0.4 - 2.0 mmol/L   POCT GLUCOSE   Result Value Ref Range    POCT Glucose 142 (H) 74 - 99 mg/dL   CBC   Result Value Ref Range    WBC 18.3 (H) 4.4 - 11.3 x10*3/uL    nRBC 0.0 0.0 - 0.0 /100 WBCs    RBC 3.18 (L) 4.50 - 5.90 x10*6/uL    Hemoglobin 10.0 (L) 13.5 - 17.5 g/dL    Hematocrit 30.8 (L) 41.0 - 52.0 %    MCV 97 80 - 100 fL    MCH 31.4 26.0 - 34.0 pg    MCHC 32.5 32.0 - 36.0 g/dL    RDW 14.5 11.5 - 14.5 %    Platelets 157 150 - 450 x10*3/uL   Creatine Kinase   Result Value Ref Range    Creatine Kinase 3,054 (H) 0 - 325 U/L   Lactate   Result Value Ref Range    Lactate 1.9 0.4 - 2.0 mmol/L   POCT GLUCOSE   Result Value Ref Range    POCT Glucose 109 (H) 74 - 99 mg/dL   POCT GLUCOSE   Result Value Ref Range    POCT Glucose 109 (H) 74 - 99 mg/dL   Comprehensive Metabolic  Panel   Result Value Ref Range    Glucose 113 (H) 74 - 99 mg/dL    Sodium 139 136 - 145 mmol/L    Potassium 4.0 3.5 - 5.3 mmol/L    Chloride 109 (H) 98 - 107 mmol/L    Bicarbonate 22 21 - 32 mmol/L    Anion Gap 12 10 - 20 mmol/L    Urea Nitrogen 37 (H) 6 - 23 mg/dL    Creatinine 1.70 (H) 0.50 - 1.30 mg/dL    eGFR 40 (L) >60 mL/min/1.73m*2    Calcium 7.8 (L) 8.6 - 10.3 mg/dL    Albumin 3.0 (L) 3.4 - 5.0 g/dL    Alkaline Phosphatase 29 (L) 33 - 136 U/L    Total Protein 4.6 (L) 6.4 - 8.2 g/dL    AST 57 (H) 9 - 39 U/L    Bilirubin, Total 0.5 0.0 - 1.2 mg/dL    ALT 24 10 - 52 U/L   Phosphorus   Result Value Ref Range    Phosphorus 5.2 (H) 2.5 - 4.9 mg/dL   Magnesium   Result Value Ref Range    Magnesium 1.11 (L) 1.60 - 2.40 mg/dL   Creatine Kinase   Result Value Ref Range    Creatine Kinase 2,885 (H) 0 - 325 U/L   CBC   Result Value Ref Range    WBC 10.4 4.4 - 11.3 x10*3/uL    nRBC 0.0 0.0 - 0.0 /100 WBCs    RBC 2.52 (L) 4.50 - 5.90 x10*6/uL    Hemoglobin 7.9 (L) 13.5 - 17.5 g/dL    Hematocrit 23.8 (L) 41.0 - 52.0 %    MCV 94 80 - 100 fL    MCH 31.3 26.0 - 34.0 pg    MCHC 33.2 32.0 - 36.0 g/dL    RDW 15.0 (H) 11.5 - 14.5 %    Platelets 141 (L) 150 - 450 x10*3/uL       Assessment/Plan:    I am currently managing this critically ill patient for the following problems:    Neuro/Psych/Pain Ctrl/Sedation:  -Neuro checks per protocol  -Patient is A&Ox's 4  -Monitor CAM-ICU  -Continue home effexor    Respiratory/ENT:  -Currently on RA  -Continuous pulse oximetry, maintain SPO2>92%  -OOB to chair    Cardiovascular:  #Hypotension 2/2 acute blood loss anemia resolved  # Left popliteal, PT, AT artery occlusion  #Hx HTN  -S/p left lower extremity angiogram with thrombectomy and balloon angioplasty 7/2  -Continuous cardiac monitoring  -Maintain goal MAP>65  -Vascular sx following  -JUAN ordered for today  -Monitor and optimize electrolytes, keep K>4, Mg>2  -Home lisinopril-hydrochlorothiazide on hold    GI:  -Diet: Regular  -GI  prophylaxis: Protonix  -Bowel regimen: Miralax  -Last BM: 7/1/25    Renal/Volume Status (Intra & Extravascular):  #Hx CKD3   -Baseline Cr ~1.4-1.6  -Renal function: 39/1.75  -Daily RFP, Mg, Phos  -Replace electrolytes PRN    Endocrine  -Monitor blood glucose daily with labs and PRN   -Goal BS<180  -Hypoglycemic protocol    Infectious Disease:  -Tmax 36.9  -WBC WNL    Heme/Onc:  #Acute blood loss anemia likely 2/2 diffuse intramuscular bleed  -CTA LLE: New severe thickening of the entire left vastus intermedius  muscle, and to a lesser extent vastus lateralis muscle, with  effacement of anterior and intramuscular fatty striations and some  mild perimuscular stranding. Differential diagnosis includes  compartment syndrome versus intramuscular hematoma, the latter  favored which could also result in compartment syndrome. However,  there is no evidence of measurable hematoma or active extravasation.  As for compartment syndrome, the location is unexpected given the  level of the popliteal artery occlusion distal to this abnormality  and there was no evidence of a representation process involving the  SFA after intervention. Recommend correlation with  hemoglobin/hematocrit that would otherwise suggest hemorrhage and  urgent surgical consultation.      3. Mild ill-defined fat stranding in the left groin and along the  proximal SFA representing small amount of non loculated blood  products without discrete hematoma in the groin.      4. New focal mild narrowing of the proximal left SFA with smooth  margins and no intimal flap or focal wall thickening is possibly  related basal spasm. The remainder of the left lower extremity  arterial system demonstrates no arterial dissection, focal intimal  injury, pseudoaneurysm, arteriovenous fistula or hemodynamically  significant stenosis.      5. Favor intramuscular blood products but both could cause  compartment syndrome      -HH: 7.7/23.2   -CBC Q8  -Transfuse for Hg <7  -VTE  prophylaxis: Holding anticoagulation until HH consistently stable    Derm/MSK:  -ICU skin protocol  -PT/OT    Ethics/Code Status:  -Full code    Lines/[place]/Restranits:  CVC: no  [place]: no  [place]: no  Restraints: no    Dispo: tx to stepdown    Plan discussed with: Dr Pickett  Critical Care Time:  40 minutes      BRISA Ho-CNP  Pulmonary and Critical Care Medicine         [1]   Past Medical History:  Diagnosis Date    Cancer (Multi)     Chronic kidney disease     Hypertension     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 90 tablet 3 6/30/2025 Morning    aspirin 81 mg EC tablet Take 1 tablet (81 mg) by mouth once daily.   6/30/2025 Morning    cholecalciferol (Vitamin D-3) 5,000 Units tablet Take 1 tablet (125 mcg) by mouth once daily.   6/30/2025 Morning    choline fenofibrate (Trilipix) 135 mg DR capsule TAKE 1 CAPSULE BY MOUTH ONCE DAILY 90 capsule 3 6/30/2025 Morning    cyanocobalamin (Vitamin B-12) 2,500 mcg tablet Take 1 tablet (2,500 mcg) by mouth once daily.   6/30/2025 Morning    fish oil concentrate (Omega-3) 120-180 mg capsule Take 1 capsule (1 g) by mouth once daily.   6/30/2025 Morning    lisinopriL-hydrochlorothiazide 20-25 mg tablet TAKE 1 AND 1/2 TABLETS BY MOUTH ONCE DAILY 135 tablet 2 7/1/2025 Morning    omeprazole (PriLOSEC) 20 mg DR capsule TAKE 1 CAPSULE BY MOUTH EVERY DAY 90 capsule 3 7/1/2025 Morning    simvastatin (Zocor) 10 mg tablet TAKE 1 TABLET BY MOUTH EVERY DAY 90 tablet 3 7/1/2025 Morning    venlafaxine XR (Effexor-XR) 75 mg 24 hr  capsule Take 1 capsule (75 mg) by mouth once daily for 15 days. Take with food. 15 capsule 0 7/1/2025 Morning    lidocaine (Lidoderm) 5 % patch Place 1 patch over 12 hours on the skin once daily. Apply to painful area 12 hours per day, remove for 12 hours. (Patient not taking: Reported on 6/17/2025) 30 patch 0     meloxicam (Mobic) 15 mg tablet TAKE 1 TABLET (15 MG) BY MOUTH ONCE DAILY AS NEEDED FOR MODERATE PAIN (4 - 6). 30 tablet 6 Unknown    methocarbamol (Robaxin) 500 mg tablet Take 1 tablet (500 mg) by mouth 4 times a day as needed for muscle spasms. 40 tablet 2 Unknown    valACYclovir (Valtrex) 1 gram tablet Take 1 tablet (1,000 mg) by mouth once daily as needed.   Unknown    venlafaxine XR (Effexor-XR) 150 mg 24 hr capsule TAKE 1 CAPSULE BY MOUTH ONCE DAILY. 90 capsule 3    [4]   Social History  Tobacco Use    Smoking status: Never    Smokeless tobacco: Never   Vaping Use    Vaping status: Never Used   Substance Use Topics    Alcohol use: Yes     Alcohol/week: 17.0 standard drinks of alcohol    Drug use: Never   [5]   Family History  Adopted: Yes   [6]    [7]   Current Facility-Administered Medications:     acetaminophen (Tylenol) tablet 650 mg, 650 mg, oral, q4h PRN **OR** acetaminophen (Tylenol) oral liquid 650 mg, 650 mg, oral, q4h PRN **OR** acetaminophen (Tylenol) suppository 650 mg, 650 mg, rectal, q4h PRN, Oumar Coleman MD    allopurinol (Zyloprim) tablet 300 mg, 300 mg, oral, Daily, Oumar Coleman MD    alteplase (Cathflo Activase) injection 1 mg, 1 mg, intra-catheter, PRN, Oumar Coleman MD, 4 mg at 07/02/25 0952    aspirin EC tablet 81 mg, 81 mg, oral, Daily, Oumar Coleman MD    atorvastatin (Lipitor) tablet 80 mg, 80 mg, oral, Nightly, Oumar Coleman MD, 80 mg at 07/02/25 2100    fenofibrate (Triglide) tablet 160 mg, 160 mg, oral, Daily, Oumar Coleman MD    HYDROmorphone PF (Dilaudid) injection 0.2 mg, 0.2 mg, intravenous, q3h PRN, Brenda Wagoner, APRN-CNP    [Held by provider]  lisinopril 20 mg, hydroCHLOROthiazide 25 mg for Zestoretic/Prinizide, , oral, Daily, Oumar Coleman MD, Given at 07/02/25 0537    magnesium sulfate 2 g in sterile water for injection 50 mL, 2 g, intravenous, Once, Caridad Bonilla, APRN-CNP, Last Rate: 25 mL/hr at 07/03/25 0630, 2 g at 07/03/25 0630    melatonin tablet 3 mg, 3 mg, oral, Nightly, Oumar Coleman MD, 3 mg at 07/02/25 2100    ondansetron (Zofran) tablet 4 mg, 4 mg, oral, q8h PRN **OR** ondansetron (Zofran) injection 4 mg, 4 mg, intravenous, q8h PRN, Oumar Coleman MD, 4 mg at 07/02/25 1430    pantoprazole (ProtoNix) EC tablet 40 mg, 40 mg, oral, Daily before breakfast, Oumar Coleman MD, 40 mg at 07/03/25 0615    polyethylene glycol (Glycolax, Miralax) packet 17 g, 17 g, oral, BID, Oumar Coleman MD, 17 g at 07/02/25 2100    venlafaxine XR (Effexor-XR) 24 hr capsule 75 mg, 75 mg, oral, Daily, Oumar Coleman MD

## 2025-07-03 NOTE — PROGRESS NOTES
"Nutrition Initial Assessment:   Nutrition Assessment    Reason for Assessment: Provider consult order    Nutrition Note:  Jamar Gill is a 85 y.o. male presenting 7/1 with  left lower extremity pain that was worsening over the past week. Work up revealing  left lower extremity angiogram via right femoral access with findings of left popliteal artery, left PT and left AT distal occlusion.  Intervention included thrombectomy and balloon angioplasty on 7/2. Post op, pt with hypotension and taught left thigh and pain; transferred to ICU. Per vascular surgery, pt likely w/ diffuse intramuscular bleed in anterior compartment of thigh in the setting of use of heparin, intra-arterial tPA, and Integrilin. Pt responsive to IVF and PRBC.    Past Medical History  Hemochromatosis with cirrhosis, dyslipidemia, primary hypertension, COPD, CKD 3B, cervical radiculopathy, GERD, gout, daily drinker 2-5 drinks/day per physician notes  Surgical History  Basal cell carcinoma excision, tonsillectomy       Nutrition History:  Energy Intake: Good > 75 %  Pain affecting nutrition status: N/A  Food and Nutrient History: Pt from home with wife--pt states they eat 3 meals per day \"I definitely have breakfast in the morning since I am usually very hungry, lunch is sometimes soup and/or sandwich, then we have a regular dinner. The last time I ate was on Monday because I didn't really have an appetite but I have one now.\"  Vitamin/Herbal Supplement Use: home meds include D3, B12, omega3  Food Allergy:  (NKFA)  Food Intolerance:  (No intolerances per pt)  Pattern of Alcohol Consumption: daily drinker 2-5 drinks/day per physician H&P       Anthropometrics:  Height: 182.9 cm (6')   Weight: 96.5 kg (212 lb 11.9 oz)   BMI (Calculated): 28.85   Admit weight 92.5kg      IBW: 80.9kg               0-10 (Numeric) Pain Score: 0 - No pain     Weight History:   6/2025: 89.8kg  3/2025: 93kg  1/2025: 93.9kg  12/2024: 93.9kg  6/2024: 89.8kg  UBW per pt is " 194-195# (88-88.6kg)  Weight Change %:  Significant Weight Loss: No    Nutrition Focused Physical Exam Findings:    Subcutaneous Fat Loss:   Orbital Fat Pads: Well nourished (slightly bulging fat pads)  Buccal Fat Pads: Well nourished (full, rounded cheeks)  Triceps: Defer  Ribs: Defer  Muscle Wasting:  Temporalis: Well nourished (well-defined muscle)  Pectoralis (Clavicular Region): Well nourished (clavicle not visible)  Deltoid/Trapezius: Defer  Interosseous: Defer  Trapezius/Infraspinatus/Supraspinatus (Scapular Region): Defer  Quadriceps: Defer  Gastrocnemius: Defer  Edema:  Edema: none  Physical Findings:  Hair: Negative  Skin: Positive  Positive Skin Findings:  (LLE incisons)  Respiratory : Negative  Digestive System Findings:  (pt denies issues)  Mouth Findings:  (pt denies issues)  Teeth Findings:  (pt denies issues)    Nutrition Significant Labs:  BMP Trend:   Results from last 7 days   Lab Units 07/03/25  0730 07/03/25  0422 07/02/25  1511 07/01/25  1407   GLUCOSE mg/dL 108* 113* 150* 100*   CALCIUM mg/dL 7.6* 7.8* 7.7* 9.6   SODIUM mmol/L 139 139 139 139   POTASSIUM mmol/L 3.8 4.0 4.0 3.9   CO2 mmol/L 20* 22 20* 24   CHLORIDE mmol/L 109* 109* 109* 103   BUN mg/dL 39* 37* 32* 36*   CREATININE mg/dL 1.75* 1.70* 1.60* 1.55*    , A1C:  Lab Results   Component Value Date    HGBA1C 5.4 09/01/2022   , BG POCT trend:   Results from last 7 days   Lab Units 07/03/25  0754 07/03/25  0343 07/03/25  0001 07/02/25  2008   POCT GLUCOSE mg/dL 121* 109* 109* 142*    , Liver Function Trend:   Results from last 7 days   Lab Units 07/03/25  0422 07/01/25  1407   ALK PHOS U/L 29* 43   AST U/L 57* 27   ALT U/L 24 21   BILIRUBIN TOTAL mg/dL 0.5 0.5    , Vit D:   Lab Results   Component Value Date    VITD25 46 09/25/2024    , Vit B12:   Lab Results   Component Value Date    [id number] 866 06/20/2025    , Folate:   Lab Results   Component Value Date    FOLATE 7.7 06/20/2025        Nutrition Specific Medications:  Scheduled  medications  Scheduled Medications[1]  Continuous medications  Continuous Medications[2]  PRN medications  PRN Medications[3]     I/O:    ;      Dietary Orders (From admission, onward)       Start     Ordered    07/03/25 0955  Adult diet Regular  Diet effective now        Question:  Diet type  Answer:  Regular    07/03/25 0954    07/02/25 0013  May Participate in Room Service  ( ROOM SERVICE MAY PARTICIPATE)  Once        Question:  .  Answer:  Yes    07/02/25 0012                     Estimated Needs:      Method for Estimating Needs: 1900-2100kcal or 21-24kcal/kg of UBW 88kg     Method for Estimating 24 Hour Protein Needs: 88-105g pro or 1.1-1.3g/kg IBW or 80.9kg     Method for Estimating 24 Hour Fluid Needs: 1mL/kcal/d or as per physician  Patient on Order Fluid Restriction: No        Nutrition Diagnosis   Malnutrition Diagnosis  Patient has Malnutrition Diagnosis: No    Nutrition Diagnosis  Patient has Nutrition Diagnosis: Yes  Diagnosis Status (1): New  Nutrition Diagnosis 1: Increased nutrient needs  Related to (1): acute metabolic stress  As Evidenced by (1): OR 7/2  Additional Assessment Information (1): 7/3: Case discussed during CCM rounds. Diet advanced per vasular surgery to regular diet.       Nutrition Interventions/Recommendations   Nutrition prescription for oral nutrition    Nutrition Recommendations:  Individualized Nutrition Prescription Provided for : continue regular diet as ordered    Nutrition Interventions/Goals:   Meals and Snacks: General healthful diet  Goal: Continue regular diet; encourage >75% of meals and PRN snacks  Medical Food Supplement: Commercial beverage medical food supplement therapy, Commercial food medical food supplement therapy  Goal: denies need  Coordination of Care with Providers: Nursing, Provider (CLAY Ram and CCM team during pt care rounds.)      Education Documentation  No documentation found.    7/3: Met with pt at bedside. Pt states has not been hospitalized for  awhile; [facility] services reviewed. Pt ordering own meals; aware can order own snacks. Pt denies need for further MNT interventions at present time.         Nutrition Monitoring and Evaluation   Intake / Amount of food: Consumes at least 75% or more of meals/snacks/supplements    Body Weight: Body weight - Maintain stable weight    Electrolyte and Renal Panel: Electrolytes within normal limits    Skin Finding: Impaired wound healing - Improved wound healing  Other: incisional wounds to heal    Goal Status: New goal(s) identified    Time Spent (min): 45 minutes             [1] allopurinol, 300 mg, oral, Daily  aspirin, 81 mg, oral, Daily  [Held by provider] atorvastatin, 80 mg, oral, Nightly  fenofibrate, 160 mg, oral, Daily  [Held by provider] lisinopril 20 mg, hydroCHLOROthiazide 25 mg for Zestoretic/Prinizide, , oral, Daily  melatonin, 3 mg, oral, Nightly  pantoprazole, 40 mg, oral, Daily before breakfast  polyethylene glycol, 17 g, oral, BID  venlafaxine XR, 75 mg, oral, Daily     [2]    [3] PRN medications: acetaminophen **OR** acetaminophen **OR** acetaminophen, alteplase, HYDROmorphone, ondansetron **OR** ondansetron

## 2025-07-03 NOTE — TREATMENT PLAN
VASCULAR EVALUATION    RLE   Right groin access site 2+ femoral pulse, no hematoma  DP biphasic signal    LLE  Popliteal - triphasic signal  DP - loud biphasic signal   PT - no signal      Anterior thigh feels tight, posterior feels soft  Lower leg compartments are soft. Patient reports normal sensation dorsum of foot, decreased sensation plantar foot. Normal strength   Patient reports overall foot feels much better    Discussed with Dr Silva from vascular surgery. This does not represent reperfusion compartment, this is likely diffuse intramuscular bleed in anterior compartment of thigh in the setting of use of heparin, intra-arterial tPA, and Integrilin. At this moment will continue to trend CK every 6 hrs and neurovascular checks q1h. Maintain LLE elevated, apply cold compresses to the thigh  Continue to monitor CBC q8hrs. Holding anticoagulation for now, once H&H stabilizes will plan to restart.      Sonia Clancy MD

## 2025-07-03 NOTE — PROGRESS NOTES
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     Subjective Data:  Patient resting in bed. Nurse and family at bedside. Denies abdominal pain. Denies back pain. Endorses improvement in left thigh edema and pain. Endorse mild left plantar surface paresthesias improving.     Overnight Events:    Afebrile. Normotensive. Acute anemia, holding anticoagulation overnight.      Objective Data:  Last Recorded Vitals:  Vitals:    07/03/25 0700 07/03/25 0800 07/03/25 0900 07/03/25 1000   BP: (!) 126/44 137/54 (!) 139/47    BP Location:       Patient Position:       Pulse: 64 60 58 64   Resp: 13 14 16 14   Temp:  36.9 °C (98.4 °F)     TempSrc:  Temporal     SpO2: 94% 96% 99% 97%   Weight:       Height:         Last I/O:  I/O last 3 completed shifts:  In: 1190.7 (12.3 mL/kg) [P.O.:50; I.V.:1124.1 (11.6 mL/kg); IV Piggyback:16.7]  Out: 1600 (16.6 mL/kg) [Urine:1100 (0.3 mL/kg/hr); Blood:500]  Weight: 96.5 kg     Physical Exam  Vitals reviewed.   Constitutional:       General: He is awake. He is not in acute distress.     Appearance: He is not ill-appearing.   HENT:      Head: Normocephalic.      Nose: Nose normal.      Mouth/Throat:      Mouth: Mucous membranes are dry.   Eyes:      Conjunctiva/sclera: Conjunctivae normal.   Cardiovascular:      Rate and Rhythm: Normal rate and regular rhythm.      Pulses:           Radial pulses are 2+ on the right side and 2+ on the left side.        Dorsalis pedis pulses are 2+ on the right side and detected w/ Doppler on the left side.      Comments: Left thigh edema improved.   Right groin w/scant sanguineous drainage no visible ecchymosis. Dressing intact.   Pulmonary:      Effort: Pulmonary effort is normal.       Breath sounds: Normal breath sounds.   Feet:      Right foot:      Skin integrity: No ulcer, blister, skin breakdown, erythema or warmth.      Left foot:      Skin integrity: No ulcer, blister, skin breakdown, erythema or warmth.   Skin:     General: Skin is warm and dry.   Neurological:      Mental Status: He is alert and oriented to person, place, and time.   Psychiatric:         Mood and Affect: Mood normal.         Behavior: Behavior is cooperative.       Last Labs:  CBC - 7/3/2025:  4:22 AM  10.4 7.9 141    23.8      CMP - 7/3/2025:  4:22 AM  7.8 4.6 57 --- 0.5   5.2 3.0 24 29      PTT - 7/1/2025:  2:07 PM  1.0   11.0 27     TROPHS   Date/Time Value Ref Range Status   06/08/2025 03:57 PM 12 0 - 20 ng/L Final   06/08/2025 03:18 PM 14 0 - 20 ng/L Final   01/08/2025 04:39 PM 12 0 - 20 ng/L Final     BNP   Date/Time Value Ref Range Status   06/08/2025 03:18 PM 12 0 - 99 pg/mL Final   01/08/2025 02:29 PM 97 0 - 99 pg/mL Final     HGBA1C   Date/Time Value Ref Range Status   09/01/2022 10:06 AM 5.4 % Final     Comment:          Diagnosis of Diabetes-Adults   Non-Diabetic: < or = 5.6%   Increased risk for developing diabetes: 5.7-6.4%   Diagnostic of diabetes: > or = 6.5%  .       Monitoring of Diabetes                Age (y)     Therapeutic Goal (%)   Adults:          >18           <7.0   Pediatrics:    13-18           <7.5                   7-12           <8.0                   0- 6            7.5-8.5   American Diabetes Association. Diabetes Care 33(S1), Jan 2010.     03/21/2022 10:54 AM 5.4 % Final     Comment:          Diagnosis of Diabetes-Adults   Non-Diabetic: < or = 5.6%   Increased risk for developing diabetes: 5.7-6.4%   Diagnostic of diabetes: > or = 6.5%  .       Monitoring of Diabetes                Age (y)     Therapeutic Goal (%)   Adults:          >18           <7.0   Pediatrics:    13-18           <7.5                   7-12           <8.0                   0- 6            7.5-8.5   American  Diabetes Association. Diabetes Care 33(S1), Jan 2010.       LDLCALC   Date/Time Value Ref Range Status   09/25/2024 01:07 PM   Final     Comment:     The calculation of LDL and VLDL are inaccurate when the Triglycerides are greater than 400 mg/dL or when the patient is non-fasting. If LDL measurement is necessary contact the testing laboratory for an alternative LDL assay.                                  Near   Borderline      AGE      Desirable  Optimal    High     High     Very High     0-19 Y     0 - 109     ---    110-129   >/= 130     ----    20-24 Y     0 - 119     ---    120-159   >/= 160     ----      >24 Y     0 -  99   100-129  130-159   160-189     >/=190     03/20/2024 10:31 AM   Final     Comment:     The calculation of LDL and VLDL are inaccurate when the Triglycerides are greater than 400 mg/dL or when the patient is non-fasting. If LDL measurement is necessary contact the testing laboratory for an alternative LDL assay.                                  Near   Borderline      AGE      Desirable  Optimal    High     High     Very High     0-19 Y     0 - 109     ---    110-129   >/= 130     ----    20-24 Y     0 - 119     ---    120-159   >/= 160     ----      >24 Y     0 -  99   100-129  130-159   160-189     >/=190       VLDL   Date/Time Value Ref Range Status   09/25/2024 01:07 PM   Final     Comment:     Unable to calculate VLDL.   03/20/2024 10:31 AM   Final     Comment:     Unable to calculate VLDL.   09/20/2023 10:15 AM 79 0 - 40 mg/dL Final   03/20/2023 11:42 AM SEE COMMENT 0 - 40 mg/dL Final     Comment:       Unable to calculate VLDL.   09/01/2022 10:06 AM SEE COMMENT 0 - 40 mg/dL Final     Comment:       Unable to calculate VLDL.      Diagnostic Imaging:     CT CTA LOWER EXTREMITY LEFT; 7/1/2025 16:32  INDICATION:  No pulses in the left lower extremity.  COMPARISON:  None Available.  ACCESSION NUMBER(S):  [id number]  ORDERING CLINICIAN:  ROME BURCH  TECHNIQUE:  Helical CT is performed  from the infrarenal aorta through  the feet after bolus administration of 90 mL of Omnipaque 350.  Images  are reviewed and processed at a workstation according to the CT  angiogram protocol with 3-D and/or MIP post processing imaging  generated.    Automated mA/kV exposure control was utilized and patient examination  was performed in strict accordance with principles of ALARA.  FINDINGS:   Vascular:  Infrarenal Aorta:   The infrarenal aorta is not aneurysmal and demonstrates no significant  occlusive disease.  There is some mild scattered mural calcific  plaquing in the infrarenal aorta.  Right Run-off:   The common, external, and internal iliac vessels demonstrate no  aneurysm or significant stenosis.  There is some minimal scattered  mural calcification in the common iliac artery.  Left Run-off:   The common, external, and internal iliac vessels demonstrate no  aneurysm or significant stenosis.There is some minimal scattered mural  calcification in the common iliac artery.  The left femoral popliteal system shows  only some mild calcific and  noncalcific plaquing in the distal superficial femoral artery.  In the proximal left popliteal artery there is a short segment of  about 50% stenosis but then there is complete occlusion of the  popliteal artery just above the level of the knee joint.  Below the  popliteal trifurcation there is some minimal filling of the runoff  vessels in the proximal calf but no significant arterial opacification  is seen in the distal calf..  Pelvis:  There is no bowel wall thickening or obstruction.  There is no free  fluid.  Lymph nodes are not enlarged.  Urinary bladder is  unremarkable.  Skeleton:    There are no acute fractures.  No suspicious bony lesions.  IMPRESSION:  The arterial system shows only minimal scattered mural plaquing on the  left down through the superficial femoral artery.  The left popliteal  artery shows some narrowing in its origin and then complete  occlusion  just above the level of the knee joint.  There is only minimal  incomplete filling of the runoff vessels below the popliteal  trifurcation level..  Signed by Markie Brito MD      STUDY:  CT ANGIO LOWER EXTREMITY LEFT W AND OR WO IV CONTRAST;  7/2/2025 7:14  pm      INDICATION:  Signs/Symptoms:left leg swelling after angiography.          COMPARISON:  None.      ACCESSION NUMBER(S):  [id number]      ORDERING CLINICIAN:  KALEB OSHEA      TECHNIQUE:  Multiple contiguous axial images of the left lower extremity were  obtained after the intravenous administration of 90 ML iodinated  contrast in the arterial and venous phase.  Coronal and sagittal  reformatted images were reconstructed from the axial data. MIP images  and 3D reconstructions were created on an independent workstation and  reviewed.      FINDINGS:  VASCULATURE:  At the site of previous popliteal artery occlusion, there remains a  severe stenosis (> 95%) due to circumferential soft plaque. (Axial  image 600, series 401). Distal to this the caliber improved but there  remains large friable soft plaque extending directly into the lumen  resulting in 50% stenosis on (axial image 687, series 04/01). There  is minimally improved caliber of the tibioperoneal trunk  demonstrating 50% stenosis distal to the anterior tibial artery  takeoff. As on preoperative imaging there is persistent occlusion of  the popliteal artery with only a few areas of minimal reconstitution  in which the lumen the remains severely stenosed. The posterior  tibial artery remains occluded in the distal leg and into the ankle.  The anterior tibial and peroneal arteries remain patent into the  foot. There remains poor peripheral circulation in the distal foot  even on delayed images.          There is new focal mild narrowing of the proximal left SFA with  smooth margins and no intimal flap or focal wall thickening is  possibly related basal spasm. The remainder of the left  lower  extremity arterial system demonstrates no arterial dissection, focal  intimal injury, pseudoaneurysm, arteriovenous fistula or  hemodynamically significant stenosis.      There is mild atherosclerosis of the distal infrarenal aorta. 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  There is mild atherosclerotic stenosis the left internal iliac artery  origin.          SOFT TISSUES:  There is mild hyperdense stranding within the left groin and along  the proximal left SFA representing ill-defined blood products. There  is no evidence of focal fluid collection. No evidence of radiodense  foreign body. No soft tissue gas.      However, there is new marked diffuse thickening of the vastus  intermedius muscle and to a lesser extent the vastus lateralis  muscle. There is mild perimuscular stranding and slight effacement of  the intermuscular and intramuscular fatty striations.          OSSEOUS STRUCTURES:  No acute fractures or malalignment.  Moderate left hip osteoarthrosis. Mild left knee osteoarthrosis.      OTHER:  No acute abnormality in the partially visualized abdomen/pelvis.  There is colonic diverticulosis.      IMPRESSION:  1. At the site of previous popliteal artery occlusion, there remains  a severe stenosis (> 95%) due to circumferential soft plaque. (Axial  image 600, series 401). Distal to this the caliber improved but there  remains large friable soft plaque extending directly into the lumen  resulting in 50% stenosis on (axial image 687, series 04/01). There  is minimally improved caliber of the tibioperoneal trunk  demonstrating 50% stenosis distal to the anterior tibial artery  takeoff. As on preoperative imaging there is persistent occlusion of  the popliteal artery with only a few areas of minimal reconstitution  in which the lumen the remains severely stenosed. The posterior  tibial artery remains occluded in the distal leg and into the ankle.  The anterior tibial and  peroneal arteries remain patent into the  foot. There remains poor peripheral circulation in the distal foot  even on delayed images.      2. New severe thickening of the entire left vastus intermedius  muscle, and to a lesser extent vastus lateralis muscle, with  effacement of anterior and intramuscular fatty striations and some  mild perimuscular stranding. Differential diagnosis includes  compartment syndrome versus intramuscular hematoma, the latter  favored which could also result in compartment syndrome. However,  there is no evidence of measurable hematoma or active extravasation.  As for compartment syndrome, the location is unexpected given the  level of the popliteal artery occlusion distal to this abnormality  and there was no evidence of a representation process involving the  SFA after intervention. Recommend correlation with  hemoglobin/hematocrit that would otherwise suggest hemorrhage and  urgent surgical consultation.      3. Mild ill-defined fat stranding in the left groin and along the  proximal SFA representing small amount of non loculated blood  products without discrete hematoma in the groin.      4. New focal mild narrowing of the proximal left SFA with smooth  margins and no intimal flap or focal wall thickening is possibly  related basal spasm. The remainder of the left lower extremity  arterial system demonstrates no arterial dissection, focal intimal  injury, pseudoaneurysm, arteriovenous fistula or hemodynamically  significant stenosis.      5. Favor intramuscular blood products but both could cause  compartment syndrome      MACRO:  Keyur Lynn discussed the significance and urgency of this  critical finding via [employer] with confirmation of receipt with  KALEB OSHEA on 7/2/2025 at 7:45 pm.  (**-RCF-**) Findings:  See  findings.      Signed by: Keyur Lynn 7/2/2025 7:45 PM  Dictation workstation:   [id number]      Inpatient Medications:  Scheduled Medications[1]  PRN  Medications[2]  Continuous Medications[3]     Assessment/Plan   Acute limb ischemia 2/2 left popliteal artery, PT, and distal AT occlusion  s/p lower extremity angiogram w/thrombectomy of left popliteal artery and PARKER, ballon angioplasty of AT & TPT, and drug coated baloon angioplasty to dSFA/pop (7/2/25)  Denies abdominal pain. Denies back pain. Endorses improvement in left thigh edema and pain. Endorse mild left plantar surface paresthesias improving.   Right groin w/scant sanguineous drainage no visible ecchymosis w/dressing intact. Left thigh edema improved.  Able to palpate right DP. Able to detect left DP with doppler.   CTA LLE 7/2/25 At the site of previous popliteal artery occlusion, there remains a severe stenosis (> 95%) due to circumferential soft plaque.  Distal to this the caliber improved but there  remains large friable soft plaque extending directly into the lumen resulting in 50% stenosis on. There is minimally improved caliber of the tibioperoneal trunk demonstrating 50% stenosis distal to the anterior tibial artery takeoff. New severe thickening of the entire left vastus intermedius muscle, and to a lesser extent vastus lateralis muscle, with effacement of anterior and intramuscular fatty striations and some mild perimuscular stranding.  Mild ill-defined fat stranding in the left groin and along the proximal SFA representing small amount of non loculated blood products without discrete hematoma in the groin.  Plan for JUAN today per Dr. Sonia Hopkins.   Serial CK monitoring.   Integrelin stopped 7/2/25. Heparin gtt on hold. Continue to monitor CBC.     Discussed plan of care with Dr. Sonia Hopkins.   I spent 35 minutes in the professional and overall care of this patient.    Jeanette Wiseman, APRN-CNP  Vascular Surgery   [facility]  [facility]       [1]   Scheduled medications   Medication Dose Route Frequency     allopurinol  300 mg oral Daily    aspirin  81 mg oral Daily    atorvastatin  80 mg oral Nightly    fenofibrate  160 mg oral Daily    [Held by provider] lisinopril 20 mg, hydroCHLOROthiazide 25 mg for Zestoretic/Prinizide   oral Daily    melatonin  3 mg oral Nightly    pantoprazole  40 mg oral Daily before breakfast    polyethylene glycol  17 g oral BID    venlafaxine XR  75 mg oral Daily   [2]   PRN medications   Medication    acetaminophen    Or    acetaminophen    Or    acetaminophen    alteplase    HYDROmorphone    ondansetron    Or    ondansetron   [3]   Continuous Medications   Medication Dose Last Rate

## 2025-07-03 NOTE — PROGRESS NOTES
07/03/25 1438   Discharge Planning   Living Arrangements Spouse/significant other   Support Systems Spouse/significant other   Type of Residence Private residence   Home or Post Acute Services None   Expected Discharge Disposition Home     Met with patient. Identified self and role. Patient lives with his wife and is independent. Verified address and insurance. PCP is Dr. Bains. Pharmacy is [facility] in [place] at [place]. Patient understands his medicines and uses. Plan to discharge home when medically cleared for discharge.

## 2025-07-03 NOTE — PROGRESS NOTES
Occupational Therapy                 Therapy Communication Note    Patient Name: Jamar Gill  MRN: [id number]  Department: [facility] ICU  Room: 2130/2130-A  Today's Date: 7/3/2025     Discipline: Occupational Therapy    Missed Visit:       Missed Visit Reason:  S/p left lower extremity angiogram with thrombectomy and balloon angioplasty 7/2, now with acute blood loss anemia likely 2/2 diffuse LLE intramuscular bleed.  Hgb dropped from 10.0 on 7/2 to 7.7 on 7/3. Anticoagulation on hold until HH consistently stable.  See CTA LLE from 7/2/25.  OT evaluation on hold.  Reattempt as medically appropriate.       Missed Time: Attempt    Comment:

## 2025-07-04 LAB
ALBUMIN SERPL BCP-MCNC: 2.9 G/DL (ref 3.4–5)
ALP SERPL-CCNC: 28 U/L (ref 33–136)
ALT SERPL W P-5'-P-CCNC: 22 U/L (ref 10–52)
ANION GAP SERPL CALC-SCNC: 12 MMOL/L (ref 10–20)
AST SERPL W P-5'-P-CCNC: 44 U/L (ref 9–39)
BILIRUB SERPL-MCNC: 0.3 MG/DL (ref 0–1.2)
BLOOD EXPIRATION DATE: NORMAL
BUN SERPL-MCNC: 43 MG/DL (ref 6–23)
CALCIUM SERPL-MCNC: 7.9 MG/DL (ref 8.6–10.3)
CHLORIDE SERPL-SCNC: 109 MMOL/L (ref 98–107)
CK SERPL-CCNC: 1368 U/L (ref 0–325)
CO2 SERPL-SCNC: 20 MMOL/L (ref 21–32)
CREAT SERPL-MCNC: 2.08 MG/DL (ref 0.5–1.3)
DISPENSE STATUS: NORMAL
EGFRCR SERPLBLD CKD-EPI 2021: 31 ML/MIN/1.73M*2
ERYTHROCYTE [DISTWIDTH] IN BLOOD BY AUTOMATED COUNT: 15 % (ref 11.5–14.5)
ERYTHROCYTE [DISTWIDTH] IN BLOOD BY AUTOMATED COUNT: 16.2 % (ref 11.5–14.5)
GLUCOSE SERPL-MCNC: 119 MG/DL (ref 74–99)
HCT VFR BLD AUTO: 19.9 % (ref 41–52)
HCT VFR BLD AUTO: 25.9 % (ref 41–52)
HGB BLD-MCNC: 6.6 G/DL (ref 13.5–17.5)
HGB BLD-MCNC: 8.8 G/DL (ref 13.5–17.5)
MAGNESIUM SERPL-MCNC: 1.59 MG/DL (ref 1.6–2.4)
MCH RBC QN AUTO: 30.7 PG (ref 26–34)
MCH RBC QN AUTO: 31 PG (ref 26–34)
MCHC RBC AUTO-ENTMCNC: 33.2 G/DL (ref 32–36)
MCHC RBC AUTO-ENTMCNC: 34 G/DL (ref 32–36)
MCV RBC AUTO: 90 FL (ref 80–100)
MCV RBC AUTO: 93 FL (ref 80–100)
NRBC BLD-RTO: 0 /100 WBCS (ref 0–0)
NRBC BLD-RTO: 0 /100 WBCS (ref 0–0)
PHOSPHATE SERPL-MCNC: 3.3 MG/DL (ref 2.5–4.9)
PLATELET # BLD AUTO: 116 X10*3/UL (ref 150–450)
PLATELET # BLD AUTO: 126 X10*3/UL (ref 150–450)
POTASSIUM SERPL-SCNC: 3.7 MMOL/L (ref 3.5–5.3)
PRODUCT BLOOD TYPE: 6200
PRODUCT CODE: NORMAL
PROT SERPL-MCNC: 4.5 G/DL (ref 6.4–8.2)
RBC # BLD AUTO: 2.13 X10*6/UL (ref 4.5–5.9)
RBC # BLD AUTO: 2.87 X10*6/UL (ref 4.5–5.9)
SODIUM SERPL-SCNC: 137 MMOL/L (ref 136–145)
UNIT ABO: NORMAL
UNIT NUMBER: NORMAL
UNIT RH: NORMAL
UNIT VOLUME: 350
WBC # BLD AUTO: 8.1 X10*3/UL (ref 4.4–11.3)
WBC # BLD AUTO: 8.8 X10*3/UL (ref 4.4–11.3)
XM INTEP: NORMAL

## 2025-07-04 PROCEDURE — 36415 COLL VENOUS BLD VENIPUNCTURE: CPT

## 2025-07-04 PROCEDURE — 85027 COMPLETE CBC AUTOMATED: CPT

## 2025-07-04 PROCEDURE — 80053 COMPREHEN METABOLIC PANEL: CPT

## 2025-07-04 PROCEDURE — 36430 TRANSFUSION BLD/BLD COMPNT: CPT

## 2025-07-04 PROCEDURE — 83735 ASSAY OF MAGNESIUM: CPT

## 2025-07-04 PROCEDURE — 2500000001 HC RX 250 WO HCPCS SELF ADMINISTERED DRUGS (ALT 637 FOR MEDICARE OP): Performed by: INTERNAL MEDICINE

## 2025-07-04 PROCEDURE — 2060000001 HC INTERMEDIATE ICU ROOM DAILY

## 2025-07-04 PROCEDURE — 99233 SBSQ HOSP IP/OBS HIGH 50: CPT | Performed by: STUDENT IN AN ORGANIZED HEALTH CARE EDUCATION/TRAINING PROGRAM

## 2025-07-04 PROCEDURE — 84100 ASSAY OF PHOSPHORUS: CPT

## 2025-07-04 PROCEDURE — 2500000002 HC RX 250 W HCPCS SELF ADMINISTERED DRUGS (ALT 637 FOR MEDICARE OP, ALT 636 FOR OP/ED): Performed by: INTERNAL MEDICINE

## 2025-07-04 PROCEDURE — P9016 RBC LEUKOCYTES REDUCED: HCPCS

## 2025-07-04 PROCEDURE — 2500000004 HC RX 250 GENERAL PHARMACY W/ HCPCS (ALT 636 FOR OP/ED): Performed by: INTERNAL MEDICINE

## 2025-07-04 PROCEDURE — 82550 ASSAY OF CK (CPK): CPT

## 2025-07-04 PROCEDURE — 99233 SBSQ HOSP IP/OBS HIGH 50: CPT | Performed by: INTERNAL MEDICINE

## 2025-07-04 RX ORDER — CLOPIDOGREL BISULFATE 75 MG/1
75 TABLET ORAL DAILY
Status: DISPENSED | OUTPATIENT
Start: 2025-07-04

## 2025-07-04 RX ADMIN — CLOPIDOGREL 75 MG: 75 TABLET ORAL at 21:24

## 2025-07-04 RX ADMIN — ATORVASTATIN CALCIUM 80 MG: 80 TABLET, FILM COATED ORAL at 21:24

## 2025-07-04 RX ADMIN — ALLOPURINOL 300 MG: 300 TABLET ORAL at 08:38

## 2025-07-04 RX ADMIN — ASPIRIN 81 MG: 81 TABLET, COATED ORAL at 08:39

## 2025-07-04 RX ADMIN — VENLAFAXINE HYDROCHLORIDE 75 MG: 75 CAPSULE, EXTENDED RELEASE ORAL at 08:38

## 2025-07-04 RX ADMIN — PANTOPRAZOLE SODIUM 40 MG: 40 TABLET, DELAYED RELEASE ORAL at 06:40

## 2025-07-04 RX ADMIN — POLYETHYLENE GLYCOL 3350 17 G: 17 POWDER, FOR SOLUTION ORAL at 08:38

## 2025-07-04 RX ADMIN — FENOFIBRATE 160 MG: 160 TABLET ORAL at 08:39

## 2025-07-04 ASSESSMENT — PAIN - FUNCTIONAL ASSESSMENT
PAIN_FUNCTIONAL_ASSESSMENT: 0-10

## 2025-07-04 ASSESSMENT — COGNITIVE AND FUNCTIONAL STATUS - GENERAL
DAILY ACTIVITIY SCORE: 24
MOBILITY SCORE: 24

## 2025-07-04 ASSESSMENT — PAIN SCALES - GENERAL
PAINLEVEL_OUTOF10: 0 - NO PAIN

## 2025-07-04 NOTE — CARE PLAN
The patient's goals for the shift include  sleep and comfort.    The clinical goals for the shift include Patient will remain hemodynamically stable throughout the shift.    Over the shift, the patient did not make progress toward the following goals. Barriers to progression include information. Recommendations to address these barriers include education.

## 2025-07-04 NOTE — PROGRESS NOTES
Jamar Gill is a 85 y.o. male on day 3 of admission presenting with Acute lower limb ischemia.      Subjective   Patient doing well.  Passing gas but no bowel movement yet..  Talked about hemoglobin being down and giving a unit of blood       Objective     Last Recorded Vitals  /60   Pulse 54   Temp 36.7 °C (98.1 °F) (Temporal)   Resp 13   Wt 96.5 kg (212 lb 11.9 oz)   SpO2 99%   Intake/Output last 3 Shifts:    Intake/Output Summary (Last 24 hours) at 7/4/2025 1203  Last data filed at 7/4/2025 0800  Gross per 24 hour   Intake 145.76 ml   Output 0 ml   Net 145.76 ml       Admission Weight  Weight: 88 kg (194 lb) (07/01/25 1240)    Daily Weight  07/03/25 : 96.5 kg (212 lb 11.9 oz)      Physical Exam:  General: Not in acute distress, alert  HEENT: PERRLA, head intact and normocephalic  Neck: Normal to inspection  Lungs: Clear to auscultation, work of breathing within normal limit  Cardiac: Regular rate and rhythm  Abdomen: Soft nontender, positive bowel sounds  : Exam deferred  Skin: Intact  Hematology: No petechia or excessive ecchymosis  Musculoskeletal: Some discomfort in the left thigh but otherwise doing well  Neurological: Alert awake oriented, no focal deficit, cranial nerves grossly intact  Psych: No suicidal ideation or homicidal ideation    Relevant Results  Scheduled medications  Scheduled Medications[1]  Continuous medications  Continuous Medications[2]  PRN medications  PRN Medications[3]   Labs  Results from last 7 days   Lab Units 07/04/25  0305 07/03/25  2100 07/03/25  1123   WBC AUTO x10*3/uL 8.1 13.8* 9.8   HEMOGLOBIN g/dL 6.6* 7.7* 7.7*   HEMATOCRIT % 19.9* 23.4* 23.2*   PLATELETS AUTO x10*3/uL 116* 143* 134*     Results from last 7 days   Lab Units 07/04/25  0305 07/03/25  0730 07/03/25  0422 07/02/25  1511 07/01/25  1407   SODIUM mmol/L 137 139 139   < > 139   POTASSIUM mmol/L 3.7 3.8 4.0   < > 3.9   CHLORIDE mmol/L 109* 109* 109*   < > 103   CO2 mmol/L 20* 20* 22   < > 24   BUN  mg/dL 43* 39* 37*   < > 36*   CREATININE mg/dL 2.08* 1.75* 1.70*   < > 1.55*   CALCIUM mg/dL 7.9* 7.6* 7.8*   < > 9.6   PROTEIN TOTAL g/dL 4.5*  --  4.6*  --  7.0   BILIRUBIN TOTAL mg/dL 0.3  --  0.5  --  0.5   ALK PHOS U/L 28*  --  29*  --  43   ALT U/L 22  --  24  --  21   AST U/L 44*  --  57*  --  27   GLUCOSE mg/dL 119* 108* 113*   < > 100*    < > = values in this interval not displayed.       Imaging  CT angio lower extremity left w and or wo IV contrast  Result Date: 7/2/2025  1. At the site of previous popliteal artery occlusion, there remains a severe stenosis (> 95%) due to circumferential soft plaque. (Axial image 600, series 401). Distal to this the caliber improved but there remains large friable soft plaque extending directly into the lumen resulting in 50% stenosis on (axial image 687, series 04/01). There is minimally improved caliber of the tibioperoneal trunk demonstrating 50% stenosis distal to the anterior tibial artery takeoff. As on preoperative imaging there is persistent occlusion of the popliteal artery with only a few areas of minimal reconstitution in which the lumen the remains severely stenosed. The posterior tibial artery remains occluded in the distal leg and into the ankle. The anterior tibial and peroneal arteries remain patent into the foot. There remains poor peripheral circulation in the distal foot even on delayed images.   2. New severe thickening of the entire left vastus intermedius muscle, and to a lesser extent vastus lateralis muscle, with effacement of anterior and intramuscular fatty striations and some mild perimuscular stranding. Differential diagnosis includes compartment syndrome versus intramuscular hematoma, the latter favored which could also result in compartment syndrome. However, there is no evidence of measurable hematoma or active extravasation. As for compartment syndrome, the location is unexpected given the level of the popliteal artery occlusion distal to  this abnormality and there was no evidence of a representation process involving the SFA after intervention. Recommend correlation with hemoglobin/hematocrit that would otherwise suggest hemorrhage and urgent surgical consultation.   3. Mild ill-defined fat stranding in the left groin and along the proximal SFA representing small amount of non loculated blood products without discrete hematoma in the groin.   4. New focal mild narrowing of the proximal left SFA with smooth margins and no intimal flap or focal wall thickening is possibly related basal spasm. The remainder of the left lower extremity arterial system demonstrates no arterial dissection, focal intimal injury, pseudoaneurysm, arteriovenous fistula or hemodynamically significant stenosis.   5. Favor intramuscular blood products but both could cause compartment syndrome   MACRO: Keyur Lynn discussed the significance and urgency of this critical finding via [employer] with confirmation of receipt with KALEB OSHEA on 7/2/2025 at 7:45 pm.  (**-RCF-**) Findings:  See findings.   Signed by: Keyur Lynn 7/2/2025 7:45 PM Dictation workstation:   [id number]    CT angio lower extremity left w and or wo IV contrast  Result Date: 7/1/2025  The arterial system shows only minimal scattered mural plaquing on the left down through the superficial femoral artery.  The left popliteal artery shows some narrowing in its origin and then complete occlusion just above the level of the knee joint.  There is only minimal incomplete filling of the runoff vessels below the popliteal trifurcation level.. Signed by Markie Brito MD      Cardiology, Vascular, and Other Imaging  Vascular US Ankle Brachial Index (JUAN) Without Exercise  Result Date: 7/4/2025            [facility] [address] [place] [address]     Tel [phone] Fax [phone]  Vascular Lab Report  VAS US ANKLE BRACHIAL INDEX (JUAN) WITHOUT EXERCISE Patient Name:      MARQUES LEE  AMOR Valenzuela Physician:  [id number] Tatiana Copeland MD Study Date:        7/3/2025             Ordering Provider:  [id number] KALEB MCMULLENJESUS MRN/PID:           [id number]             Fellow: Accession#:        [id number]         Technologist:       Yolette Dean RVT, MS Date of Birth/Age: 1940 / 85 years Technologist 2: Gender:            M                    Encounter#:         [id number] Admission Status:  Inpatient            Location Performed: [facility]  Diagnosis/ICD: Atherosclerosis of native arteries of extremities with rest pain,                left leg-I70.222 Indication:    Post LLE intervention  Smoker:            Never. Pertinent History: Previous Vasc Surg, HTN and Hyperlipidemia. post LLE                    angiogram with left Pop A & tib A thrombectomy & angioplasty                    to distal FA & DEBORA.  CONCLUSIONS: Right Lower PVR: No evidence of arterial occlusive disease in the right lower extremity at rest. Normal digital perfusion noted. Multiphasic flow is noted in the right common femoral artery, right posterior tibial artery and right dorsalis pedis artery. The right digit tracing is diminished. Left Lower PVR: Evidence of mild arterial occlusive disease in the left lower extremity at rest. Decreased digital perfusion noted. Monophasic flow is noted in the left posterior tibial artery and left dorsalis pedis artery. Multiphasic flow is noted in the left common femoral artery.  Imaging & Doppler Findings:  RIGHT Lower PVR                Pressures Ratios Right Posterior Tibial (Ankle) 139 mmHg  0.99 Right Dorsalis Pedis (Ankle)   117 mmHg  0.84 Right Digit (Great Toe)        90 mmHg   0.64   LEFT Lower PVR                Pressures Ratios Left Posterior Tibial (Ankle) 78 mmHg   0.56 Left Dorsalis Pedis (Ankle)   106 mmHg  0.76 Left Digit (Great Toe)        80  mmHg   0.57                      Right     Left Brachial Pressure 135 mmHg 140 mmHg   [id number] Tatiana Copeland MD Electronically signed by [id number] Tatiana Copeland MD on 7/4/2025 at 9:15:04 AM  ** Final **     ECG 12 lead  Result Date: 7/2/2025  Sinus rhythm with 1st degree AV block with Premature atrial complexes Right bundle branch block Left anterior fascicular block ** Bifascicular block ** Septal infarct (cited on or before 01-JUL-2025) Abnormal ECG When compared with ECG of 08-JUN-2025 14:40, Premature atrial complexes are now Present Questionable change in initial forces of Septal leads Nonspecific T wave abnormality, worse in Lateral leads                      Assessment/Plan   Jamar Gill is a 85 y.o. male with past medical history of hemochromatosis with cirrhosis, dyslipidemia, primary hypertension, COPD, CKD stage IIIb cervical radiculopathy, GERD, gout presented for left lower extremity pain and was found to have acute left lower limb ischemia requiring vascular intervention.  Subsequently patient was transferred to ICU for hypotension and symptomatic acute blood loss anemia.  Assessment & Plan  Acute lower limb ischemia    Acute limb ischemia  Status post intervention by vascular surgery-patient had thrombectomy and balloon angioplasty on 7/2/2025  Patient can move down to stepdown unit when bed is available  Continue to hold antihypertensive medications  Continue with aspirin  Management as per vascular surgery    Hypotension secondary to symptomatic acute blood loss anemia-hemorrhagic shock  Hemoglobin is still down trended today  Will give unit of packed RBC  Hold antihypertensive medications  Monitor blood pressure trend  Keep hemoglobin greater than 7    Hyperlipidemia  Continue with fenofibrate  Resume statin    Hypertension  Blood pressure running on lower side  Continue to hold lisinopril/hydrochlorothiazide    DVT prophylaxis  SCDs and ambulation     Plan discussed with patient at bedside    High  level of MDM based on above issue and discussing plan    This note is created using voice recognition software. All efforts are made to minimize errors, if there are errors there due to transcription.    Bayron Yañez  Hospitalist           [1] allopurinol, 300 mg, oral, Daily  aspirin, 81 mg, oral, Daily  [Held by provider] atorvastatin, 80 mg, oral, Nightly  fenofibrate, 160 mg, oral, Daily  [Held by provider] lisinopril 20 mg, hydroCHLOROthiazide 25 mg for Zestoretic/Prinizide, , oral, Daily  melatonin, 3 mg, oral, Nightly  pantoprazole, 40 mg, oral, Daily before breakfast  polyethylene glycol, 17 g, oral, BID  venlafaxine XR, 75 mg, oral, Daily    [2]    [3] PRN medications: acetaminophen **OR** acetaminophen **OR** acetaminophen, alteplase, dextrose, dextrose, glucagon, glucagon, HYDROmorphone, ondansetron **OR** ondansetron

## 2025-07-04 NOTE — PROGRESS NOTES
ENDOVASCULAR/LIMB SALVAGE PROGRESS NOTE      Subjective Data:  Hemoglobin was down to 6.6 overnight.  Improved after a unit of blood  Reports foot feels better, denies pain, although still numb on the plantar surface of the foot.  Thigh feels better     Objective Data:  Last Recorded Vitals:  Vitals:    07/04/25 1200 07/04/25 1300 07/04/25 1400 07/04/25 1500   BP: 144/58      BP Location:       Patient Position:       Pulse: 64 70 68 70   Resp: 18 24 17 19   Temp: 36.7 °C (98.1 °F)      TempSrc: Temporal      SpO2: 100% 98% 99% 99%   Weight:       Height:           Physical Exam:  General: NAD, well-appearing  HEENT: moist mucous membranes, no jaundice  Neck: No JVD, no carotid bruit  Lungs: CTA tyree, no wheezing or rales  Cardiac: RRR, no murmurs  Abdomen: soft, non-tender, non-distended  Extremities: Left thigh soft, mild tenderness, DP biphasic signal, PT no signal, plantar aspect of foot is numb  Skin: warm, dry  Neurologic: AAOx3,  no focal deficits       Assessment/Plan   # Acute limb ischemia  # Acute blood loss anemia    -Status post popliteal artery thrombectomy and balloon angioplasty with restoration of flow to the foot via AT/DP  -Postprocedure ABIs reviewed  - Recommend to continue aspirin and Plavix 75 mg daily.  Once hemoglobin stabilizes we will plan to start anticoagulation with heparin drip.  Reassess for tomorrow  - Etiology for thrombus potentially cardioembolic.  Recommend to obtain echocardiogram with bubble study.  Monitor telemetry to rule out atrial fibrillation      Sonia Clancy MD

## 2025-07-05 ENCOUNTER — APPOINTMENT (OUTPATIENT)
Dept: CARDIOLOGY | Facility: HOSPITAL | Age: 85
End: 2025-07-05
Payer: MEDICARE

## 2025-07-05 LAB
ALBUMIN SERPL BCP-MCNC: 3.1 G/DL (ref 3.4–5)
ALP SERPL-CCNC: 31 U/L (ref 33–136)
ALT SERPL W P-5'-P-CCNC: 25 U/L (ref 10–52)
ANION GAP SERPL CALC-SCNC: 12 MMOL/L (ref 10–20)
AORTIC VALVE PEAK VELOCITY: 1.28 M/S
AST SERPL W P-5'-P-CCNC: 43 U/L (ref 9–39)
AV PEAK GRADIENT: 7 MMHG
AVA (PEAK VEL): 3.03 CM2
BASOPHILS # BLD AUTO: 0.02 X10*3/UL (ref 0–0.1)
BASOPHILS NFR BLD AUTO: 0.3 %
BILIRUB SERPL-MCNC: 0.4 MG/DL (ref 0–1.2)
BUN SERPL-MCNC: 32 MG/DL (ref 6–23)
CALCIUM SERPL-MCNC: 8.5 MG/DL (ref 8.6–10.3)
CHLORIDE SERPL-SCNC: 111 MMOL/L (ref 98–107)
CK SERPL-CCNC: 852 U/L (ref 0–325)
CO2 SERPL-SCNC: 22 MMOL/L (ref 21–32)
CREAT SERPL-MCNC: 1.58 MG/DL (ref 0.5–1.3)
EGFRCR SERPLBLD CKD-EPI 2021: 43 ML/MIN/1.73M*2
EJECTION FRACTION APICAL 4 CHAMBER: 49
EJECTION FRACTION: 55 %
EOSINOPHIL # BLD AUTO: 0.17 X10*3/UL (ref 0–0.4)
EOSINOPHIL NFR BLD AUTO: 2.5 %
ERYTHROCYTE [DISTWIDTH] IN BLOOD BY AUTOMATED COUNT: 16.5 % (ref 11.5–14.5)
GLUCOSE SERPL-MCNC: 128 MG/DL (ref 74–99)
HCT VFR BLD AUTO: 24.2 % (ref 41–52)
HGB BLD-MCNC: 8.2 G/DL (ref 13.5–17.5)
IMM GRANULOCYTES # BLD AUTO: 0.02 X10*3/UL (ref 0–0.5)
IMM GRANULOCYTES NFR BLD AUTO: 0.3 % (ref 0–0.9)
LEFT VENTRICLE INTERNAL DIMENSION DIASTOLE: 4.42 CM (ref 3.5–6)
LEFT VENTRICULAR OUTFLOW TRACT DIAMETER: 2.16 CM
LV EJECTION FRACTION BIPLANE: 51 %
LYMPHOCYTES # BLD AUTO: 1.76 X10*3/UL (ref 0.8–3)
LYMPHOCYTES NFR BLD AUTO: 25.4 %
MAGNESIUM SERPL-MCNC: 1.43 MG/DL (ref 1.6–2.4)
MCH RBC QN AUTO: 30.8 PG (ref 26–34)
MCHC RBC AUTO-ENTMCNC: 33.9 G/DL (ref 32–36)
MCV RBC AUTO: 91 FL (ref 80–100)
MITRAL VALVE E/A RATIO: 1.01
MONOCYTES # BLD AUTO: 0.57 X10*3/UL (ref 0.05–0.8)
MONOCYTES NFR BLD AUTO: 8.2 %
NEUTROPHILS # BLD AUTO: 4.38 X10*3/UL (ref 1.6–5.5)
NEUTROPHILS NFR BLD AUTO: 63.3 %
NRBC BLD-RTO: 0 /100 WBCS (ref 0–0)
PHOSPHATE SERPL-MCNC: 2.8 MG/DL (ref 2.5–4.9)
PLATELET # BLD AUTO: 102 X10*3/UL (ref 150–450)
POTASSIUM SERPL-SCNC: 3.8 MMOL/L (ref 3.5–5.3)
PROT SERPL-MCNC: 4.9 G/DL (ref 6.4–8.2)
RBC # BLD AUTO: 2.66 X10*6/UL (ref 4.5–5.9)
RIGHT VENTRICLE FREE WALL PEAK S': 17 CM/S
RIGHT VENTRICLE PEAK SYSTOLIC PRESSURE: 39 MMHG
SODIUM SERPL-SCNC: 141 MMOL/L (ref 136–145)
TRICUSPID ANNULAR PLANE SYSTOLIC EXCURSION: 1.8 CM
WBC # BLD AUTO: 6.9 X10*3/UL (ref 4.4–11.3)

## 2025-07-05 PROCEDURE — 2500000001 HC RX 250 WO HCPCS SELF ADMINISTERED DRUGS (ALT 637 FOR MEDICARE OP): Performed by: INTERNAL MEDICINE

## 2025-07-05 PROCEDURE — 2500000002 HC RX 250 W HCPCS SELF ADMINISTERED DRUGS (ALT 637 FOR MEDICARE OP, ALT 636 FOR OP/ED): Performed by: INTERNAL MEDICINE

## 2025-07-05 PROCEDURE — 82550 ASSAY OF CK (CPK): CPT | Performed by: INTERNAL MEDICINE

## 2025-07-05 PROCEDURE — 84100 ASSAY OF PHOSPHORUS: CPT

## 2025-07-05 PROCEDURE — 2500000004 HC RX 250 GENERAL PHARMACY W/ HCPCS (ALT 636 FOR OP/ED): Performed by: INTERNAL MEDICINE

## 2025-07-05 PROCEDURE — 99233 SBSQ HOSP IP/OBS HIGH 50: CPT | Performed by: INTERNAL MEDICINE

## 2025-07-05 PROCEDURE — 36415 COLL VENOUS BLD VENIPUNCTURE: CPT

## 2025-07-05 PROCEDURE — 85025 COMPLETE CBC W/AUTO DIFF WBC: CPT | Performed by: INTERNAL MEDICINE

## 2025-07-05 PROCEDURE — 93306 TTE W/DOPPLER COMPLETE: CPT

## 2025-07-05 PROCEDURE — 83735 ASSAY OF MAGNESIUM: CPT

## 2025-07-05 PROCEDURE — 93306 TTE W/DOPPLER COMPLETE: CPT | Performed by: INTERNAL MEDICINE

## 2025-07-05 PROCEDURE — 1200000002 HC GENERAL ROOM WITH TELEMETRY DAILY

## 2025-07-05 PROCEDURE — 80053 COMPREHEN METABOLIC PANEL: CPT

## 2025-07-05 RX ORDER — MAGNESIUM SULFATE HEPTAHYDRATE 40 MG/ML
2 INJECTION, SOLUTION INTRAVENOUS ONCE
Status: DISCONTINUED | OUTPATIENT
Start: 2025-07-05 | End: 2025-07-05

## 2025-07-05 RX ORDER — MAGNESIUM SULFATE HEPTAHYDRATE 40 MG/ML
4 INJECTION, SOLUTION INTRAVENOUS ONCE
Status: COMPLETED | OUTPATIENT
Start: 2025-07-05 | End: 2025-07-05

## 2025-07-05 RX ORDER — POTASSIUM CHLORIDE 20 MEQ/1
40 TABLET, EXTENDED RELEASE ORAL ONCE
Status: COMPLETED | OUTPATIENT
Start: 2025-07-05 | End: 2025-07-05

## 2025-07-05 RX ORDER — HEPARIN SODIUM 10000 [USP'U]/100ML
0-4500 INJECTION, SOLUTION INTRAVENOUS CONTINUOUS
Status: CANCELLED | OUTPATIENT
Start: 2025-07-05

## 2025-07-05 RX ADMIN — FENOFIBRATE 160 MG: 160 TABLET ORAL at 09:41

## 2025-07-05 RX ADMIN — VENLAFAXINE HYDROCHLORIDE 75 MG: 75 CAPSULE, EXTENDED RELEASE ORAL at 09:41

## 2025-07-05 RX ADMIN — POLYETHYLENE GLYCOL 3350 17 G: 17 POWDER, FOR SOLUTION ORAL at 20:11

## 2025-07-05 RX ADMIN — CLOPIDOGREL 75 MG: 75 TABLET ORAL at 09:41

## 2025-07-05 RX ADMIN — ALLOPURINOL 300 MG: 300 TABLET ORAL at 09:41

## 2025-07-05 RX ADMIN — ASPIRIN 81 MG: 81 TABLET, COATED ORAL at 09:41

## 2025-07-05 RX ADMIN — ATORVASTATIN CALCIUM 80 MG: 80 TABLET, FILM COATED ORAL at 20:11

## 2025-07-05 RX ADMIN — POLYETHYLENE GLYCOL 3350 17 G: 17 POWDER, FOR SOLUTION ORAL at 09:41

## 2025-07-05 RX ADMIN — POTASSIUM CHLORIDE EXTENDED-RELEASE 40 MEQ: 1500 TABLET ORAL at 09:41

## 2025-07-05 RX ADMIN — MAGNESIUM SULFATE HEPTAHYDRATE 4 G: 40 INJECTION, SOLUTION INTRAVENOUS at 09:41

## 2025-07-05 RX ADMIN — PANTOPRAZOLE SODIUM 40 MG: 40 TABLET, DELAYED RELEASE ORAL at 09:41

## 2025-07-05 ASSESSMENT — COGNITIVE AND FUNCTIONAL STATUS - GENERAL
MOBILITY SCORE: 24
DAILY ACTIVITIY SCORE: 24
DAILY ACTIVITIY SCORE: 24
MOBILITY SCORE: 24

## 2025-07-05 ASSESSMENT — PAIN SCALES - GENERAL
PAINLEVEL_OUTOF10: 0 - NO PAIN

## 2025-07-05 ASSESSMENT — PAIN - FUNCTIONAL ASSESSMENT
PAIN_FUNCTIONAL_ASSESSMENT: 0-10

## 2025-07-05 NOTE — NURSING NOTE
1537: Attempted to call report to receiving nurse. Will attempt again.     1550: Report called to CLAY Baltazar on 3N. Transport requested via w/c

## 2025-07-05 NOTE — PROGRESS NOTES
Jamar Gill is a 85 y.o. male on day 4 of admission presenting with Acute lower limb ischemia.      Subjective   Doing well.  Passing gas but has not had a bowel movement yet.  Urinating on his own.  Talked about starting anticoagulation and moving him out of ICU to telemetry bed.  Leg pain is improving       Objective     Last Recorded Vitals  /58 (BP Location: Right arm, Patient Position: Sitting)   Pulse 76   Temp 36.4 °C (97.5 °F) (Temporal)   Resp 23   Wt 88 kg (194 lb 0.1 oz)   SpO2 97%   Intake/Output last 3 Shifts:    Intake/Output Summary (Last 24 hours) at 7/5/2025 1101  Last data filed at 7/5/2025 0948  Gross per 24 hour   Intake --   Output 2750 ml   Net -2750 ml       Admission Weight  Weight: 88 kg (194 lb) (07/01/25 1240)    Daily Weight  07/05/25 : 88 kg (194 lb 0.1 oz)      Physical Exam:  General: Not in acute distress, alert.  On room air  HEENT: PERRLA, head intact and normocephalic  Neck: Normal to inspection  Lungs: Clear to auscultation, work of breathing within normal limit  Cardiac: Regular rate and rhythm  Abdomen: Soft nontender, positive bowel sounds  : Exam deferred  Skin: Intact  Hematology: No petechia or excessive ecchymosis  Musculoskeletal: Without significant trauma  Neurological: Alert awake oriented, no focal deficit, cranial nerves grossly intact  Psych: No suicidal ideation or homicidal ideation    Relevant Results  Scheduled medications  Scheduled Medications[1]  Continuous medications  Continuous Medications[2]  PRN medications  PRN Medications[3]   Labs  Results from last 7 days   Lab Units 07/05/25  0513 07/04/25  1251 07/04/25  0305   WBC AUTO x10*3/uL 6.9 8.8 8.1   HEMOGLOBIN g/dL 8.2* 8.8* 6.6*   HEMATOCRIT % 24.2* 25.9* 19.9*   PLATELETS AUTO x10*3/uL 102* 126* 116*     Results from last 7 days   Lab Units 07/05/25  0513 07/04/25  0305 07/03/25  0730 07/03/25  0422   SODIUM mmol/L 141 137 139 139   POTASSIUM mmol/L 3.8 3.7 3.8 4.0   CHLORIDE mmol/L  111* 109* 109* 109*   CO2 mmol/L 22 20* 20* 22   BUN mg/dL 32* 43* 39* 37*   CREATININE mg/dL 1.58* 2.08* 1.75* 1.70*   CALCIUM mg/dL 8.5* 7.9* 7.6* 7.8*   PROTEIN TOTAL g/dL 4.9* 4.5*  --  4.6*   BILIRUBIN TOTAL mg/dL 0.4 0.3  --  0.5   ALK PHOS U/L 31* 28*  --  29*   ALT U/L 25 22  --  24   AST U/L 43* 44*  --  57*   GLUCOSE mg/dL 128* 119* 108* 113*       Imaging  CT angio lower extremity left w and or wo IV contrast  Result Date: 7/2/2025  1. At the site of previous popliteal artery occlusion, there remains a severe stenosis (> 95%) due to circumferential soft plaque. (Axial image 600, series 401). Distal to this the caliber improved but there remains large friable soft plaque extending directly into the lumen resulting in 50% stenosis on (axial image 687, series 04/01). There is minimally improved caliber of the tibioperoneal trunk demonstrating 50% stenosis distal to the anterior tibial artery takeoff. As on preoperative imaging there is persistent occlusion of the popliteal artery with only a few areas of minimal reconstitution in which the lumen the remains severely stenosed. The posterior tibial artery remains occluded in the distal leg and into the ankle. The anterior tibial and peroneal arteries remain patent into the foot. There remains poor peripheral circulation in the distal foot even on delayed images.   2. New severe thickening of the entire left vastus intermedius muscle, and to a lesser extent vastus lateralis muscle, with effacement of anterior and intramuscular fatty striations and some mild perimuscular stranding. Differential diagnosis includes compartment syndrome versus intramuscular hematoma, the latter favored which could also result in compartment syndrome. However, there is no evidence of measurable hematoma or active extravasation. As for compartment syndrome, the location is unexpected given the level of the popliteal artery occlusion distal to this abnormality and there was no  evidence of a representation process involving the SFA after intervention. Recommend correlation with hemoglobin/hematocrit that would otherwise suggest hemorrhage and urgent surgical consultation.   3. Mild ill-defined fat stranding in the left groin and along the proximal SFA representing small amount of non loculated blood products without discrete hematoma in the groin.   4. New focal mild narrowing of the proximal left SFA with smooth margins and no intimal flap or focal wall thickening is possibly related basal spasm. The remainder of the left lower extremity arterial system demonstrates no arterial dissection, focal intimal injury, pseudoaneurysm, arteriovenous fistula or hemodynamically significant stenosis.   5. Favor intramuscular blood products but both could cause compartment syndrome   MACRO: Keyur Lynn discussed the significance and urgency of this critical finding via [employer] with confirmation of receipt with KALEB OSHEA on 7/2/2025 at 7:45 pm.  (**-RCF-**) Findings:  See findings.   Signed by: Keyur Lynn 7/2/2025 7:45 PM Dictation workstation:   [id number]    CT angio lower extremity left w and or wo IV contrast  Result Date: 7/1/2025  The arterial system shows only minimal scattered mural plaquing on the left down through the superficial femoral artery.  The left popliteal artery shows some narrowing in its origin and then complete occlusion just above the level of the knee joint.  There is only minimal incomplete filling of the runoff vessels below the popliteal trifurcation level.. Signed by Markie Brito MD      Cardiology, Vascular, and Other Imaging  Vascular US Ankle Brachial Index (JUAN) Without Exercise  Result Date: 7/4/2025            [facility] [address] [place] [address]     Tel [phone] Fax [phone]  Vascular Lab Report  [facility] US ANKLE BRACHIAL INDEX (JUAN) WITHOUT EXERCISE Patient Name:      MARQUES Valenzuela Physician:   [id number] Tatiana Copeland MD Study Date:        7/3/2025             Ordering Provider:  [id number] KALEB MCMULLENJESUS MRN/PID:           [id number]             Fellow: Accession#:        [id number]         Technologist:       Yolette Dean RVT, RDMS Date of Birth/Age: 1940 / 85 years Technologist 2: Gender:            M                    Encounter#:         [id number] Admission Status:  Inpatient            [facility] Performed[facility]  Diagnosis/ICD: Atherosclerosis of native arteries of extremities with rest pain,                left leg-I70.222 Indication:    Post LLE intervention  Smoker:            Never. Pertinent History: Previous Vasc Surg, HTN and Hyperlipidemia. post LLE                    angiogram with left Pop A & tib A thrombectomy & angioplasty                    to distal FA & DEBORA.  CONCLUSIONS: Right Lower PVR: No evidence of arterial occlusive disease in the right lower extremity at rest. Normal digital perfusion noted. Multiphasic flow is noted in the right common femoral artery, right posterior tibial artery and right dorsalis pedis artery. The right digit tracing is diminished. Left Lower PVR: Evidence of mild arterial occlusive disease in the left lower extremity at rest. Decreased digital perfusion noted. Monophasic flow is noted in the left posterior tibial artery and left dorsalis pedis artery. Multiphasic flow is noted in the left common femoral artery.  Imaging & Doppler Findings:  RIGHT Lower PVR                Pressures Ratios Right Posterior Tibial (Ankle) 139 mmHg  0.99 Right Dorsalis Pedis (Ankle)   117 mmHg  0.84 Right Digit (Great Toe)        90 mmHg   0.64   LEFT Lower PVR                Pressures Ratios Left Posterior Tibial (Ankle) 78 mmHg   0.56 Left Dorsalis Pedis (Ankle)   106 mmHg  0.76 Left Digit (Great Toe)        80 mmHg   0.57                       Right     Left Brachial Pressure 135 mmHg 140 mmHg   [id number] Tatiana Copeland MD Electronically signed by [id number] Tatiana Copeland MD on 7/4/2025 at 9:15:04 AM  ** Final **     ECG 12 lead  Result Date: 7/2/2025  Sinus rhythm with 1st degree AV block with Premature atrial complexes Right bundle branch block Left anterior fascicular block ** Bifascicular block ** Septal infarct (cited on or before 01-JUL-2025) Abnormal ECG When compared with ECG of 08-JUN-2025 14:40, Premature atrial complexes are now Present Questionable change in initial forces of Septal leads Nonspecific T wave abnormality, worse in Lateral leads                      Assessment/Plan   Jamar Gill is a 85 y.o. male with past medical history of hemochromatosis with cirrhosis, dyslipidemia, primary hypertension, COPD, CKD stage IIIb cervical radiculopathy, GERD, gout presented for left lower extremity pain and was found to have acute left lower limb ischemia requiring vascular intervention.  Subsequently patient was transferred to ICU for hypotension and symptomatic acute blood loss anemia.  Patient required some blood transfusion but now is hemodynamically stable and will be moving out to telemetry floor and will challenge him with heparin drip.  Assessment & Plan  Acute lower limb ischemia    Acute limb ischemia  Status post intervention by vascular surgery-patient had thrombectomy and balloon angioplasty on 7/2/2025  Cannula patient to telemetry  Will start patient on heparin drip for cardioembolic phenomena  Follow-up echocardiogram with bubble study  Discussed with Dr. Leal regarding the patient  Currently on aspirin and Plavix and will see if one of them can be stopped once patient is on heparin drip  Monitor H&H  No need for transfusion     Hypotension secondary to symptomatic acute blood loss anemia-hemorrhagic shock  H&H is stable  Will continue monitoring  Hold antihypertensive medications for now  Will start patient on heparin drip      Hyperlipidemia  Continue with fenofibrate  Elevated CK is likely related to acute limb ischemia rather than side effect of statin  Continue with statin     Hypertension  Blood pressure is stable  Continue to hold lisinopril/hydrochlorothiazide     DVT prophylaxis  SCDs and ambulation  Will start patient on heparin drip         Plan discussed with patient and primary nurse at bedside in ICU    High level of MDM based on above issue and discussing plan    This note is created using voice recognition software. All efforts are made to minimize errors, if there are errors there due to transcription.    Bayron Yañez  Hospitalist           [1] allopurinol, 300 mg, oral, Daily  aspirin, 81 mg, oral, Daily  atorvastatin, 80 mg, oral, Nightly  clopidogrel, 75 mg, oral, Daily  fenofibrate, 160 mg, oral, Daily  [Held by provider] lisinopril 20 mg, hydroCHLOROthiazide 25 mg for Zestoretic/Prinizide, , oral, Daily  magnesium sulfate, 4 g, intravenous, Once  melatonin, 3 mg, oral, Nightly  pantoprazole, 40 mg, oral, Daily before breakfast  polyethylene glycol, 17 g, oral, BID  venlafaxine XR, 75 mg, oral, Daily    [2]    [3] PRN medications: acetaminophen **OR** acetaminophen **OR** acetaminophen, alteplase, dextrose, dextrose, glucagon, glucagon, HYDROmorphone, ondansetron **OR** ondansetron

## 2025-07-05 NOTE — CARE PLAN
The patient's goals for the shift include      The clinical goals for the shift include Pt will remain HDS this shift.   Pt arrived to the unit at 1630. A/Ox4 SB/indep with a walker no complaints of pain. VSS. 2+ pulses

## 2025-07-06 VITALS
HEIGHT: 72 IN | SYSTOLIC BLOOD PRESSURE: 165 MMHG | WEIGHT: 202.82 LBS | HEART RATE: 80 BPM | BODY MASS INDEX: 27.47 KG/M2 | TEMPERATURE: 97.3 F | DIASTOLIC BLOOD PRESSURE: 73 MMHG | OXYGEN SATURATION: 100 % | RESPIRATION RATE: 18 BRPM

## 2025-07-06 LAB
ALBUMIN SERPL BCP-MCNC: 3.5 G/DL (ref 3.4–5)
ALP SERPL-CCNC: 36 U/L (ref 33–136)
ALT SERPL W P-5'-P-CCNC: 32 U/L (ref 10–52)
ANION GAP SERPL CALC-SCNC: 12 MMOL/L (ref 10–20)
AST SERPL W P-5'-P-CCNC: 38 U/L (ref 9–39)
BILIRUB SERPL-MCNC: 0.6 MG/DL (ref 0–1.2)
BUN SERPL-MCNC: 28 MG/DL (ref 6–23)
CALCIUM SERPL-MCNC: 9 MG/DL (ref 8.6–10.3)
CHLORIDE SERPL-SCNC: 109 MMOL/L (ref 98–107)
CK SERPL-CCNC: 304 U/L (ref 0–325)
CO2 SERPL-SCNC: 24 MMOL/L (ref 21–32)
CREAT SERPL-MCNC: 1.47 MG/DL (ref 0.5–1.3)
EGFRCR SERPLBLD CKD-EPI 2021: 47 ML/MIN/1.73M*2
ERYTHROCYTE [DISTWIDTH] IN BLOOD BY AUTOMATED COUNT: 16.1 % (ref 11.5–14.5)
GLUCOSE SERPL-MCNC: 115 MG/DL (ref 74–99)
HCT VFR BLD AUTO: 26 % (ref 41–52)
HGB BLD-MCNC: 8.5 G/DL (ref 13.5–17.5)
MCH RBC QN AUTO: 30.4 PG (ref 26–34)
MCHC RBC AUTO-ENTMCNC: 32.7 G/DL (ref 32–36)
MCV RBC AUTO: 93 FL (ref 80–100)
NRBC BLD-RTO: 0 /100 WBCS (ref 0–0)
PLATELET # BLD AUTO: 155 X10*3/UL (ref 150–450)
POTASSIUM SERPL-SCNC: 4.6 MMOL/L (ref 3.5–5.3)
PROT SERPL-MCNC: 5.7 G/DL (ref 6.4–8.2)
RBC # BLD AUTO: 2.8 X10*6/UL (ref 4.5–5.9)
SODIUM SERPL-SCNC: 140 MMOL/L (ref 136–145)
UFH PPP CHRO-ACNC: 0.7 IU/ML (ref ?–1.1)
UFH PPP CHRO-ACNC: 1.2 IU/ML (ref ?–1.1)
UFH PPP CHRO-ACNC: 1.5 IU/ML (ref ?–1.1)
WBC # BLD AUTO: 7 X10*3/UL (ref 4.4–11.3)

## 2025-07-06 PROCEDURE — 2500000004 HC RX 250 GENERAL PHARMACY W/ HCPCS (ALT 636 FOR OP/ED): Performed by: INTERNAL MEDICINE

## 2025-07-06 PROCEDURE — 82550 ASSAY OF CK (CPK): CPT | Performed by: INTERNAL MEDICINE

## 2025-07-06 PROCEDURE — 2500000001 HC RX 250 WO HCPCS SELF ADMINISTERED DRUGS (ALT 637 FOR MEDICARE OP): Performed by: INTERNAL MEDICINE

## 2025-07-06 PROCEDURE — 1200000002 HC GENERAL ROOM WITH TELEMETRY DAILY

## 2025-07-06 PROCEDURE — 85027 COMPLETE CBC AUTOMATED: CPT | Performed by: INTERNAL MEDICINE

## 2025-07-06 PROCEDURE — 2500000002 HC RX 250 W HCPCS SELF ADMINISTERED DRUGS (ALT 637 FOR MEDICARE OP, ALT 636 FOR OP/ED): Performed by: INTERNAL MEDICINE

## 2025-07-06 PROCEDURE — 36415 COLL VENOUS BLD VENIPUNCTURE: CPT | Performed by: INTERNAL MEDICINE

## 2025-07-06 PROCEDURE — 80053 COMPREHEN METABOLIC PANEL: CPT | Performed by: INTERNAL MEDICINE

## 2025-07-06 PROCEDURE — 85520 HEPARIN ASSAY: CPT | Performed by: INTERNAL MEDICINE

## 2025-07-06 PROCEDURE — 99233 SBSQ HOSP IP/OBS HIGH 50: CPT | Performed by: INTERNAL MEDICINE

## 2025-07-06 RX ORDER — HEPARIN SODIUM 10000 [USP'U]/100ML
0-4500 INJECTION, SOLUTION INTRAVENOUS CONTINUOUS
Status: DISPENSED | OUTPATIENT
Start: 2025-07-06

## 2025-07-06 RX ORDER — MORPHINE SULFATE 4 MG/ML
4 INJECTION, SOLUTION INTRAMUSCULAR; INTRAVENOUS ONCE
Status: ACTIVE | OUTPATIENT
Start: 2025-07-06

## 2025-07-06 RX ADMIN — HEPARIN SODIUM 1656 UNITS/HR: 10000 INJECTION, SOLUTION INTRAVENOUS at 11:08

## 2025-07-06 RX ADMIN — POLYETHYLENE GLYCOL 3350 17 G: 17 POWDER, FOR SOLUTION ORAL at 21:33

## 2025-07-06 RX ADMIN — FENOFIBRATE 160 MG: 160 TABLET ORAL at 08:57

## 2025-07-06 RX ADMIN — POLYETHYLENE GLYCOL 3350 17 G: 17 POWDER, FOR SOLUTION ORAL at 09:00

## 2025-07-06 RX ADMIN — HEPARIN SODIUM 1456 UNITS/HR: 10000 INJECTION, SOLUTION INTRAVENOUS at 23:07

## 2025-07-06 RX ADMIN — PANTOPRAZOLE SODIUM 40 MG: 40 TABLET, DELAYED RELEASE ORAL at 06:20

## 2025-07-06 RX ADMIN — VENLAFAXINE HYDROCHLORIDE 75 MG: 75 CAPSULE, EXTENDED RELEASE ORAL at 08:57

## 2025-07-06 RX ADMIN — CLOPIDOGREL 75 MG: 75 TABLET ORAL at 08:57

## 2025-07-06 RX ADMIN — ALLOPURINOL 300 MG: 300 TABLET ORAL at 08:58

## 2025-07-06 RX ADMIN — ATORVASTATIN CALCIUM 80 MG: 80 TABLET, FILM COATED ORAL at 21:33

## 2025-07-06 ASSESSMENT — COGNITIVE AND FUNCTIONAL STATUS - GENERAL
MOBILITY SCORE: 24
MOBILITY SCORE: 24
DAILY ACTIVITIY SCORE: 24
DAILY ACTIVITIY SCORE: 24

## 2025-07-06 ASSESSMENT — PAIN SCALES - GENERAL
PAINLEVEL_OUTOF10: 0 - NO PAIN
PAINLEVEL_OUTOF10: 0 - NO PAIN
PAINLEVEL_OUTOF10: 2

## 2025-07-06 ASSESSMENT — PAIN - FUNCTIONAL ASSESSMENT
PAIN_FUNCTIONAL_ASSESSMENT: 0-10

## 2025-07-06 ASSESSMENT — PAIN DESCRIPTION - DESCRIPTORS: DESCRIPTORS: ACHING;DISCOMFORT;TIGHTNESS

## 2025-07-06 NOTE — CARE PLAN
The patient's goals for this shift include    The clinical goals for this shift include see POC.     Patient is A/Ox4, RA, on tele. Took medications with no difficulties.     Patient walked around the unit with a walker and a stand by assist.    Heparin drip was started at 1108.     Heparin Assay drawn at 1505. Second will need to be drawn at 1905, then every 4 hours until two therapeutic results.     At 1555, lab called the unit and spoke to the nurse. Patient's heparin assay was 1.5. Another stat assay was ordered to be sure this number is accurate. Team was notified at 1559. At 1616 redraw was 1.2; will follow protocol and hold heparin for one hour. Will restart heparin at 1716 and decrease the rate by 200 units/hour.      New heparin rate of 1456 units/hour started at 1726. Next assay redraw is at 2016.

## 2025-07-06 NOTE — CARE PLAN
Problem: Safety - Adult  Goal: Free from fall injury  Outcome: Progressing     The patient's goals for the shift include comfort/safety/rest    The clinical goals for the shift include Pt will remain HDS this shift.

## 2025-07-06 NOTE — PROGRESS NOTES
Jamar Gill is a 85 y.o. male on day 5 of admission presenting with Acute lower limb ischemia.      Subjective   Case discussed with vascular surgery and recommended starting patient on heparin and stopping aspirin and seeing how he does.  If he does well and his hemoglobin remains stable consider discharge tomorrow.  Patient continues to improve his strength and able to walk without any discomfort using the walker       Objective     Last Recorded Vitals  /61 (BP Location: Left arm, Patient Position: Lying)   Pulse 57   Temp 36.1 °C (97 °F) (Temporal)   Resp 16   Wt 92 kg (202 lb 13.2 oz)   SpO2 98%   Intake/Output last 3 Shifts:    Intake/Output Summary (Last 24 hours) at 7/6/2025 1006  Last data filed at 7/6/2025 0622  Gross per 24 hour   Intake 120 ml   Output --   Net 120 ml       Admission Weight  Weight: 88 kg (194 lb) (07/01/25 1240)    Daily Weight  07/06/25 : 92 kg (202 lb 13.2 oz)      Physical Exam:  General: Not in acute distress, alert.  On room air  HEENT: PERRLA, head intact and normocephalic  Neck: Normal to inspection  Lungs: Clear to auscultation, work of breathing within normal limit  Cardiac: Regular rate and rhythm  Abdomen: Soft nontender, positive bowel sounds  : Exam deferred  Skin: Intact  Hematology: No petechia or excessive ecchymosis  Musculoskeletal: Without significant trauma  Neurological: Alert awake oriented, no focal deficit, cranial nerves grossly intact  Psych: No suicidal ideation or homicidal ideation    Relevant Results  Scheduled medications  Scheduled Medications[1]  Continuous medications  Continuous Medications[2]  PRN medications  PRN Medications[3]   Labs  Results from last 7 days   Lab Units 07/05/25  0513 07/04/25  1251 07/04/25  0305   WBC AUTO x10*3/uL 6.9 8.8 8.1   HEMOGLOBIN g/dL 8.2* 8.8* 6.6*   HEMATOCRIT % 24.2* 25.9* 19.9*   PLATELETS AUTO x10*3/uL 102* 126* 116*     Results from last 7 days   Lab Units 07/05/25  0513 07/04/25  0305  07/03/25  0730 07/03/25  0422   SODIUM mmol/L 141 137 139 139   POTASSIUM mmol/L 3.8 3.7 3.8 4.0   CHLORIDE mmol/L 111* 109* 109* 109*   CO2 mmol/L 22 20* 20* 22   BUN mg/dL 32* 43* 39* 37*   CREATININE mg/dL 1.58* 2.08* 1.75* 1.70*   CALCIUM mg/dL 8.5* 7.9* 7.6* 7.8*   PROTEIN TOTAL g/dL 4.9* 4.5*  --  4.6*   BILIRUBIN TOTAL mg/dL 0.4 0.3  --  0.5   ALK PHOS U/L 31* 28*  --  29*   ALT U/L 25 22  --  24   AST U/L 43* 44*  --  57*   GLUCOSE mg/dL 128* 119* 108* 113*       Imaging  CT angio lower extremity left w and or wo IV contrast  Result Date: 7/2/2025  1. At the site of previous popliteal artery occlusion, there remains a severe stenosis (> 95%) due to circumferential soft plaque. (Axial image 600, series 401). Distal to this the caliber improved but there remains large friable soft plaque extending directly into the lumen resulting in 50% stenosis on (axial image 687, series 04/01). There is minimally improved caliber of the tibioperoneal trunk demonstrating 50% stenosis distal to the anterior tibial artery takeoff. As on preoperative imaging there is persistent occlusion of the popliteal artery with only a few areas of minimal reconstitution in which the lumen the remains severely stenosed. The posterior tibial artery remains occluded in the distal leg and into the ankle. The anterior tibial and peroneal arteries remain patent into the foot. There remains poor peripheral circulation in the distal foot even on delayed images.   2. New severe thickening of the entire left vastus intermedius muscle, and to a lesser extent vastus lateralis muscle, with effacement of anterior and intramuscular fatty striations and some mild perimuscular stranding. Differential diagnosis includes compartment syndrome versus intramuscular hematoma, the latter favored which could also result in compartment syndrome. However, there is no evidence of measurable hematoma or active extravasation. As for compartment syndrome, the location  is unexpected given the level of the popliteal artery occlusion distal to this abnormality and there was no evidence of a representation process involving the SFA after intervention. Recommend correlation with hemoglobin/hematocrit that would otherwise suggest hemorrhage and urgent surgical consultation.   3. Mild ill-defined fat stranding in the left groin and along the proximal SFA representing small amount of non loculated blood products without discrete hematoma in the groin.   4. New focal mild narrowing of the proximal left SFA with smooth margins and no intimal flap or focal wall thickening is possibly related basal spasm. The remainder of the left lower extremity arterial system demonstrates no arterial dissection, focal intimal injury, pseudoaneurysm, arteriovenous fistula or hemodynamically significant stenosis.   5. Favor intramuscular blood products but both could cause compartment syndrome   MACRO: Keyur Lynn discussed the significance and urgency of this critical finding via [email or web address] with confirmation of receipt with KALEB OSHEA on 7/2/2025 at 7:45 pm.  (**-RCF-**) Findings:  See findings.   Signed by: Keyur Lynn 7/2/2025 7:45 PM Dictation workstation:   [id number]    CT angio lower extremity left w and or wo IV contrast  Result Date: 7/1/2025  The arterial system shows only minimal scattered mural plaquing on the left down through the superficial femoral artery.  The left popliteal artery shows some narrowing in its origin and then complete occlusion just above the level of the knee joint.  There is only minimal incomplete filling of the runoff vessels below the popliteal trifurcation level.. Signed by Markie Brito MD      Cardiology, Vascular, and Other Imaging  Transthoracic Echo Complete  Result Date: 7/5/2025            [facility] [address] [facility] [id number]    Tel [phone] Fax [phone] TRANSTHORACIC ECHOCARDIOGRAM REPORT Patient Name:        MARQUES Valenzuela Physician:    [id number] Shai Clancy MD Study Date:         7/5/2025             Ordering Provider:    [id number] AAMIR LOO MRN/PID:            [id number]             Fellow: Accession#:         [id number]         Nurse: Date of Birth/Age:  1940 / 85 years Sonographer:          Yi Machado RDCS Gender Assigned at  M                    Additional Staff: Birth: Height:             177.80 cm            Admit Date: Weight:             88.00 kg             Admission Status:     Inpatient -                                                                Routine BSA / BMI:          2.06 m2 / 27.84      Department Location:  [facility] ICU Back                     kg/m2                                      (27-34) Blood Pressure: 112 /60 mmHg Study Type:    TRANSTHORACIC ECHO (TTE) COMPLETE Diagnosis/ICD: Other chest pain-R07.89 Indication:    CP, concern for embolism CPT Codes:     Echo Complete w Full [id number] Patient History: Pertinent History: HTN, Hyperlipidemia and COPD. Study Detail: The following Echo studies were performed: 2D, M-Mode, Doppler and               color flow.  PHYSICIAN INTERPRETATION: Left Ventricle: The left ventricular systolic function is normal with a visually estimated ejection fraction of 55%. There is mild concentric left ventricular hypertrophy. There are no regional wall motion abnormalities. The left ventricular cavity size is normal. There is mild increased septal and mildly increased posterior left ventricular wall thickness. There is left ventricular concentric remodeling. Spectral Doppler shows a Grade I (impaired relaxation pattern) of left ventricular diastolic filling with normal left atrial filling pressure. Left Atrium: The left atrial size is mildly dilated. Right Ventricle: The right ventricle is normal in size. There is normal  right ventricular global systolic function. Right Atrium: The right atrial size is normal. Aortic Valve: The aortic valve is trileaflet. There is no evidence of aortic valve regurgitation. Mitral Valve: The mitral valve is normal in structure. There is mild mitral valve regurgitation. The E Vmax is 1.13 m/s. Tricuspid Valve: The tricuspid valve is structurally normal. There is trace tricuspid regurgitation. The Doppler estimated right ventricular systolic pressure (RVSP) is mildly elevated at 39 mmHg. Pulmonic Valve: The pulmonic valve is structurally normal. There is no indication of pulmonic valve regurgitation. Pericardium: No pericardial effusion noted. Aorta: The aortic root is normal. Systemic Veins: The inferior vena cava appears normal in size, with IVC inspiratory collapse greater than 50%.  CONCLUSIONS:  1. The left ventricular systolic function is normal with a visually estimated ejection fraction of 55%.  2. Spectral Doppler shows a Grade I (impaired relaxation pattern) of left ventricular diastolic filling with normal left atrial filling pressure.  3. There is normal right ventricular global systolic function.  4. The Doppler estimated RVSP is mildly elevated at 39 mmHg. QUANTITATIVE DATA SUMMARY:  2D MEASUREMENTS:             Normal Ranges: LAs:             4.09 cm     (2.7-4.0cm) IVSd:            1.18 cm     (0.6-1.1cm) LVPWd:           1.25 cm     (0.6-1.1cm) LVIDd:           4.42 cm     (3.9-5.9cm) LVIDs:           3.54 cm LV Mass Index:   94.9 g/m2 LVEDV Index:     51.57 ml/m2 LV % FS          20.0 %  LEFT ATRIUM:                 Normal Ranges: LA Area A4C:      18.0 cm2 LA Area A2C:      18.0 cm2 LA Volume Index:  26.0 ml/m2 LA Vol A4C:       47.4 ml LA Vol A2C:       48.9 ml LA Vol Index BSA: 23.4 ml/m2  M-MODE MEASUREMENTS:         Normal Ranges: AoV Exc:             2.09 cm (1.5-2.5cm)  AORTA MEASUREMENTS:         Normal Ranges: AoV Exc:            2.09 cm (1.5-2.5cm)  LV SYSTOLIC FUNCTION:                       Normal Ranges: EF-A4C View:    49 % (>=55%) EF-A2C View:    55 % EF-Biplane:     51 % EF-Visual:      55 % LV EF Reported: 55 %  LV DIASTOLIC FUNCTION:            Normal Ranges: MV Peak E:             1.13 m/s   (0.7-1.2 m/s) MV Peak A:             1.12 m/s   (0.42-0.7 m/s) E/A Ratio:             1.01       (1.0-2.2) MV e'                  0.095 m/s  (>8.0) MV lateral e'          0.10 m/s MV medial e'           0.09 m/s E/e' Ratio:            11.87      (<8.0) PulmV Sys Trevor:         65.18 cm/s PulmV Weber Trevor:        60.03 cm/s PulmV S/D Trevor:         1.09  MITRAL VALVE:          Normal Ranges: MV DT:        202 msec (150-240msec)  AORTIC VALVE:           Normal Ranges: AoV Vmax:      1.28 m/s (<=1.7m/s) AoV Peak P.5 mmHg (<20mmHg) LVOT Max Trevor:  1.05 m/s (<=1.1m/s) LVOT VTI:      23.38 cm LVOT Diameter: 2.16 cm  (1.8-2.4cm) AoV Area,Vmax: 3.03 cm2 (2.5-4.5cm2)  RIGHT VENTRICLE: RV Basal 3.80 cm RV Mid   2.00 cm RV Major 7.3 cm TAPSE:   18.0 mm RV s'    0.17 m/s  TRICUSPID VALVE/RVSP:          Normal Ranges: Peak TR Velocity:     3.02 m/s Est. RA Pressure:     3 mmHg RV Syst Pressure:     39 mmHg  (< 30mmHg) IVC Diam:             1.95 cm  PULMONIC VALVE:         Normal Ranges: PV Accel Time:  91 msec (>120ms)  PULMONARY VEINS: PulmV Weber Trevor: 60.03 cm/s PulmV S/D Trevor:  1.09 PulmV Sys Trevor:  65.18 cm/s  AORTA: Asc Ao Diam 3.04 cm  [id number] Shai Clancy MD Electronically signed on 2025 at 3:43:34 PM  ** Final **     Vascular US Ankle Brachial Index (JUAN) Without Exercise  Result Date: 2025            [facility] [address] [facility] [place] [address]     Tel [phone] Fax [phone]  Vascular Lab Report  [facility] US ANKLE BRACHIAL INDEX (JUAN) WITHOUT EXERCISE Patient Name:      MARQUES Valenzuela Physician:  [id number] Tatiana Copeland MD Study Date:        7/3/2025             Ordering Provider:  [id number] KALEB OSHEA  MRN/PID:           [id number]             Fellow: Accession#:        [id number]         Technologist:       Yolette Dean RVT, RDMS Date of Birth/Age: 1940 / 85 years Technologist 2: Gender:            M                    Encounter#:         [id number] Admission Status:  Inpatient            Location Performed: [facility]  Diagnosis/ICD: Atherosclerosis of native arteries of extremities with rest pain,                left leg-I70.222 Indication:    Post LLE intervention  Smoker:            Never. Pertinent History: Previous Vasc Surg, HTN and Hyperlipidemia. post LLE                    angiogram with left Pop A & tib A thrombectomy & angioplasty                    to distal FA & DEBORA.  CONCLUSIONS: Right Lower PVR: No evidence of arterial occlusive disease in the right lower extremity at rest. Normal digital perfusion noted. Multiphasic flow is noted in the right common femoral artery, right posterior tibial artery and right dorsalis pedis artery. The right digit tracing is diminished. Left Lower PVR: Evidence of mild arterial occlusive disease in the left lower extremity at rest. Decreased digital perfusion noted. Monophasic flow is noted in the left posterior tibial artery and left dorsalis pedis artery. Multiphasic flow is noted in the left common femoral artery.  Imaging & Doppler Findings:  RIGHT Lower PVR                Pressures Ratios Right Posterior Tibial (Ankle) 139 mmHg  0.99 Right Dorsalis Pedis (Ankle)   117 mmHg  0.84 Right Digit (Great Toe)        90 mmHg   0.64   LEFT Lower PVR                Pressures Ratios Left Posterior Tibial (Ankle) 78 mmHg   0.56 Left Dorsalis Pedis (Ankle)   106 mmHg  0.76 Left Digit (Great Toe)        80 mmHg   0.57                      Right     Left Brachial Pressure 135 mmHg 140 mmHg   [id number] Tatiana Copeland MD Electronically signed by [id number] Tatiana Copeland MD on 7/4/2025 at 9:15:04 AM  ** Final **      ECG 12 lead  Result Date: 7/2/2025  Sinus rhythm with 1st degree AV block with Premature atrial complexes Right bundle branch block Left anterior fascicular block ** Bifascicular block ** Septal infarct (cited on or before 01-JUL-2025) Abnormal ECG When compared with ECG of 08-JUN-2025 14:40, Premature atrial complexes are now Present Questionable change in initial forces of Septal leads Nonspecific T wave abnormality, worse in Lateral leads                      Assessment/Plan   Jamar Gill is a 85 y.o. male with past medical history of hemochromatosis with cirrhosis, dyslipidemia, primary hypertension, COPD, CKD stage IIIb cervical radiculopathy, GERD, gout presented for left lower extremity pain and was found to have acute left lower limb ischemia requiring vascular intervention.  Subsequently patient was transferred to ICU for hypotension and symptomatic acute blood loss anemia.  Patient required some blood transfusion but now is hemodynamically stable and will be moving out to telemetry floor and will challenge him with heparin drip.  Assessment & Plan  Acute lower limb ischemia    Acute limb ischemia  Status post intervention by vascular surgery-patient had thrombectomy and balloon angioplasty on 7/2/2025  Cannula patient to telemetry  Start patient on heparin drip  Echocardiogram results are noted  Discussed with Dr. Leal regarding the patient  Discontinue aspirin and continue only Plavix  Restarted statin  If hemoglobin remains stable, consider discharge tomorrow     Hypotension secondary to symptomatic acute blood loss anemia-hemorrhagic shock  H&H is stable  Will continue monitoring  Will start patient on heparin drip today and if hemoglobin is stable till tomorrow consider discharge on oral anticoagulation     Hyperlipidemia  Continue with fenofibrate  Elevated CK is likely related to acute limb ischemia rather than side effect of statin  Continue with statin     Hypertension  Blood pressure is  stable  Continue to hold lisinopril/hydrochlorothiazide     DVT prophylaxis  SCDs and ambulation  Patient on heparin drip         Plan discussed with patient and primary nurse at bedside on telemetry floor.    High level of MDM based on above issue and discussing plan    This note is created using voice recognition software. All efforts are made to minimize errors, if there are errors there due to transcription.    Bayron Yañez  Hospitalist             [1] allopurinol, 300 mg, oral, Daily  atorvastatin, 80 mg, oral, Nightly  clopidogrel, 75 mg, oral, Daily  fenofibrate, 160 mg, oral, Daily  heparin, 80 Units/kg, intravenous, Once  [Held by provider] lisinopril 20 mg, hydroCHLOROthiazide 25 mg for Zestoretic/Prinizide, , oral, Daily  melatonin, 3 mg, oral, Nightly  pantoprazole, 40 mg, oral, Daily before breakfast  polyethylene glycol, 17 g, oral, BID  venlafaxine XR, 75 mg, oral, Daily     [2] heparin, 0-4,500 Units/hr     [3] PRN medications: acetaminophen **OR** acetaminophen **OR** acetaminophen, alteplase, dextrose, dextrose, glucagon, glucagon, HYDROmorphone, ondansetron **OR** ondansetron

## 2025-07-06 NOTE — PROGRESS NOTES
Assessment complete.  Patient is alert and oriented X4.  Denies any SOB, N/V, dizziness or pain at this time.  Vital signs are stable.  Resting comfortably and denies any needs at this time.  Call light is within reach and bed alarm is in place.

## 2025-07-07 LAB
ANION GAP SERPL CALC-SCNC: 11 MMOL/L (ref 10–20)
BASOPHILS # BLD AUTO: 0.02 X10*3/UL (ref 0–0.1)
BASOPHILS # BLD AUTO: 0.04 X10*3/UL (ref 0–0.1)
BASOPHILS NFR BLD AUTO: 0.3 %
BASOPHILS NFR BLD AUTO: 0.5 %
BUN SERPL-MCNC: 27 MG/DL (ref 6–23)
CALCIUM SERPL-MCNC: 8.6 MG/DL (ref 8.6–10.3)
CHLORIDE SERPL-SCNC: 110 MMOL/L (ref 98–107)
CO2 SERPL-SCNC: 23 MMOL/L (ref 21–32)
CREAT SERPL-MCNC: 1.5 MG/DL (ref 0.5–1.3)
EGFRCR SERPLBLD CKD-EPI 2021: 46 ML/MIN/1.73M*2
EOSINOPHIL # BLD AUTO: 0.21 X10*3/UL (ref 0–0.4)
EOSINOPHIL # BLD AUTO: 0.21 X10*3/UL (ref 0–0.4)
EOSINOPHIL NFR BLD AUTO: 2.7 %
EOSINOPHIL NFR BLD AUTO: 3.7 %
ERYTHROCYTE [DISTWIDTH] IN BLOOD BY AUTOMATED COUNT: 16.1 % (ref 11.5–14.5)
ERYTHROCYTE [DISTWIDTH] IN BLOOD BY AUTOMATED COUNT: 16.3 % (ref 11.5–14.5)
GLUCOSE SERPL-MCNC: 113 MG/DL (ref 74–99)
HCT VFR BLD AUTO: 23.4 % (ref 41–52)
HCT VFR BLD AUTO: 28.5 % (ref 41–52)
HGB BLD-MCNC: 7.6 G/DL (ref 13.5–17.5)
HGB BLD-MCNC: 9.3 G/DL (ref 13.5–17.5)
HOLD SPECIMEN: NORMAL
HOLD SPECIMEN: NORMAL
IMM GRANULOCYTES # BLD AUTO: 0.03 X10*3/UL (ref 0–0.5)
IMM GRANULOCYTES # BLD AUTO: 0.04 X10*3/UL (ref 0–0.5)
IMM GRANULOCYTES NFR BLD AUTO: 0.5 % (ref 0–0.9)
IMM GRANULOCYTES NFR BLD AUTO: 0.5 % (ref 0–0.9)
LYMPHOCYTES # BLD AUTO: 2.06 X10*3/UL (ref 0.8–3)
LYMPHOCYTES # BLD AUTO: 2.33 X10*3/UL (ref 0.8–3)
LYMPHOCYTES NFR BLD AUTO: 30.4 %
LYMPHOCYTES NFR BLD AUTO: 36 %
MAGNESIUM SERPL-MCNC: 1.46 MG/DL (ref 1.6–2.4)
MCH RBC QN AUTO: 30.4 PG (ref 26–34)
MCH RBC QN AUTO: 30.8 PG (ref 26–34)
MCHC RBC AUTO-ENTMCNC: 32.5 G/DL (ref 32–36)
MCHC RBC AUTO-ENTMCNC: 32.6 G/DL (ref 32–36)
MCV RBC AUTO: 94 FL (ref 80–100)
MCV RBC AUTO: 94 FL (ref 80–100)
MONOCYTES # BLD AUTO: 0.52 X10*3/UL (ref 0.05–0.8)
MONOCYTES # BLD AUTO: 0.54 X10*3/UL (ref 0.05–0.8)
MONOCYTES NFR BLD AUTO: 6.8 %
MONOCYTES NFR BLD AUTO: 9.4 %
NEUTROPHILS # BLD AUTO: 2.87 X10*3/UL (ref 1.6–5.5)
NEUTROPHILS # BLD AUTO: 4.53 X10*3/UL (ref 1.6–5.5)
NEUTROPHILS NFR BLD AUTO: 50.1 %
NEUTROPHILS NFR BLD AUTO: 59.1 %
NRBC BLD-RTO: 0 /100 WBCS (ref 0–0)
NRBC BLD-RTO: 0 /100 WBCS (ref 0–0)
PLATELET # BLD AUTO: 148 X10*3/UL (ref 150–450)
PLATELET # BLD AUTO: 207 X10*3/UL (ref 150–450)
POTASSIUM SERPL-SCNC: 4.4 MMOL/L (ref 3.5–5.3)
RBC # BLD AUTO: 2.5 X10*6/UL (ref 4.5–5.9)
RBC # BLD AUTO: 3.02 X10*6/UL (ref 4.5–5.9)
SODIUM SERPL-SCNC: 140 MMOL/L (ref 136–145)
UFH PPP CHRO-ACNC: 0.8 IU/ML (ref ?–1.1)
WBC # BLD AUTO: 5.7 X10*3/UL (ref 4.4–11.3)
WBC # BLD AUTO: 7.7 X10*3/UL (ref 4.4–11.3)

## 2025-07-07 PROCEDURE — 80048 BASIC METABOLIC PNL TOTAL CA: CPT | Performed by: INTERNAL MEDICINE

## 2025-07-07 PROCEDURE — 97165 OT EVAL LOW COMPLEX 30 MIN: CPT | Mod: GO

## 2025-07-07 PROCEDURE — 36415 COLL VENOUS BLD VENIPUNCTURE: CPT | Performed by: INTERNAL MEDICINE

## 2025-07-07 PROCEDURE — 1200000002 HC GENERAL ROOM WITH TELEMETRY DAILY

## 2025-07-07 PROCEDURE — 2500000004 HC RX 250 GENERAL PHARMACY W/ HCPCS (ALT 636 FOR OP/ED): Performed by: INTERNAL MEDICINE

## 2025-07-07 PROCEDURE — 2500000001 HC RX 250 WO HCPCS SELF ADMINISTERED DRUGS (ALT 637 FOR MEDICARE OP): Performed by: INTERNAL MEDICINE

## 2025-07-07 PROCEDURE — 2500000002 HC RX 250 W HCPCS SELF ADMINISTERED DRUGS (ALT 637 FOR MEDICARE OP, ALT 636 FOR OP/ED): Performed by: INTERNAL MEDICINE

## 2025-07-07 PROCEDURE — 85520 HEPARIN ASSAY: CPT | Performed by: INTERNAL MEDICINE

## 2025-07-07 PROCEDURE — 85025 COMPLETE CBC W/AUTO DIFF WBC: CPT | Performed by: INTERNAL MEDICINE

## 2025-07-07 PROCEDURE — 99233 SBSQ HOSP IP/OBS HIGH 50: CPT | Performed by: INTERNAL MEDICINE

## 2025-07-07 PROCEDURE — 99231 SBSQ HOSP IP/OBS SF/LOW 25: CPT | Performed by: NURSE PRACTITIONER

## 2025-07-07 PROCEDURE — 83735 ASSAY OF MAGNESIUM: CPT | Performed by: INTERNAL MEDICINE

## 2025-07-07 PROCEDURE — 97161 PT EVAL LOW COMPLEX 20 MIN: CPT | Mod: GP

## 2025-07-07 RX ORDER — LISINOPRIL 10 MG/1
10 TABLET ORAL DAILY
Status: DISCONTINUED | OUTPATIENT
Start: 2025-07-07 | End: 2025-07-07

## 2025-07-07 RX ORDER — AMLODIPINE BESYLATE 5 MG/1
5 TABLET ORAL DAILY
Status: DISCONTINUED | OUTPATIENT
Start: 2025-07-07 | End: 2025-07-08 | Stop reason: HOSPADM

## 2025-07-07 RX ORDER — PANTOPRAZOLE SODIUM 40 MG/1
40 TABLET, DELAYED RELEASE ORAL
Status: DISCONTINUED | OUTPATIENT
Start: 2025-07-07 | End: 2025-07-08 | Stop reason: HOSPADM

## 2025-07-07 RX ADMIN — APIXABAN 2.5 MG: 2.5 TABLET, FILM COATED ORAL at 09:47

## 2025-07-07 RX ADMIN — CLOPIDOGREL 75 MG: 75 TABLET ORAL at 08:22

## 2025-07-07 RX ADMIN — PANTOPRAZOLE SODIUM 40 MG: 40 TABLET, DELAYED RELEASE ORAL at 05:51

## 2025-07-07 RX ADMIN — AMLODIPINE BESYLATE 5 MG: 5 TABLET ORAL at 08:22

## 2025-07-07 RX ADMIN — VENLAFAXINE HYDROCHLORIDE 75 MG: 75 CAPSULE, EXTENDED RELEASE ORAL at 08:22

## 2025-07-07 RX ADMIN — FENOFIBRATE 160 MG: 160 TABLET ORAL at 08:22

## 2025-07-07 RX ADMIN — ATORVASTATIN CALCIUM 80 MG: 80 TABLET, FILM COATED ORAL at 20:31

## 2025-07-07 RX ADMIN — APIXABAN 5 MG: 5 TABLET, FILM COATED ORAL at 20:32

## 2025-07-07 RX ADMIN — POLYETHYLENE GLYCOL 3350 17 G: 17 POWDER, FOR SOLUTION ORAL at 08:22

## 2025-07-07 RX ADMIN — ALLOPURINOL 300 MG: 300 TABLET ORAL at 08:22

## 2025-07-07 RX ADMIN — PANTOPRAZOLE SODIUM 40 MG: 40 TABLET, DELAYED RELEASE ORAL at 15:07

## 2025-07-07 ASSESSMENT — COGNITIVE AND FUNCTIONAL STATUS - GENERAL
DRESSING REGULAR LOWER BODY CLOTHING: A LITTLE
DRESSING REGULAR UPPER BODY CLOTHING: A LITTLE
CLIMB 3 TO 5 STEPS WITH RAILING: A LITTLE
MOVING TO AND FROM BED TO CHAIR: A LITTLE
WALKING IN HOSPITAL ROOM: A LITTLE
WALKING IN HOSPITAL ROOM: A LITTLE
CLIMB 3 TO 5 STEPS WITH RAILING: A LITTLE
MOVING TO AND FROM BED TO CHAIR: A LITTLE
DRESSING REGULAR LOWER BODY CLOTHING: A LITTLE
TOILETING: A LITTLE
DRESSING REGULAR UPPER BODY CLOTHING: A LITTLE
DAILY ACTIVITIY SCORE: 20
MOBILITY SCORE: 18
TURNING FROM BACK TO SIDE WHILE IN FLAT BAD: A LITTLE
MOVING FROM LYING ON BACK TO SITTING ON SIDE OF FLAT BED WITH BEDRAILS: A LITTLE
STANDING UP FROM CHAIR USING ARMS: A LITTLE
HELP NEEDED FOR BATHING: A LITTLE
STANDING UP FROM CHAIR USING ARMS: A LITTLE
MOVING FROM LYING ON BACK TO SITTING ON SIDE OF FLAT BED WITH BEDRAILS: A LITTLE
MOBILITY SCORE: 19
MOVING TO AND FROM BED TO CHAIR: A LITTLE
TOILETING: A LITTLE
HELP NEEDED FOR BATHING: A LITTLE
STANDING UP FROM CHAIR USING ARMS: A LITTLE
MOBILITY SCORE: 18
DAILY ACTIVITIY SCORE: 20
DAILY ACTIVITIY SCORE: 24
TURNING FROM BACK TO SIDE WHILE IN FLAT BAD: A LITTLE
WALKING IN HOSPITAL ROOM: A LITTLE
TURNING FROM BACK TO SIDE WHILE IN FLAT BAD: A LITTLE
CLIMB 3 TO 5 STEPS WITH RAILING: A LITTLE

## 2025-07-07 ASSESSMENT — PAIN SCALES - GENERAL
PAINLEVEL_OUTOF10: 0 - NO PAIN

## 2025-07-07 ASSESSMENT — PAIN - FUNCTIONAL ASSESSMENT
PAIN_FUNCTIONAL_ASSESSMENT: 0-10

## 2025-07-07 ASSESSMENT — ACTIVITIES OF DAILY LIVING (ADL): ADL_ASSISTANCE: INDEPENDENT

## 2025-07-07 NOTE — NURSING NOTE
Pt calm and cooperative through shift. Pt did not complain of pain this shift. Pt rested through shift and ambulated in hallwas multiple times this shift. Pt had no changes with neurovascular checks this shift.

## 2025-07-07 NOTE — PROGRESS NOTES
Assessment complete.  Patient is alert and oriented X4.  Vital signs are stable.  Denies any SOB, nausea, or dizziness at this time.  Is complaining of left foot mild pain, swelling and tightness which is a change from earlier.  Used the doppler to find pedal pulse.  Message sent to Dr. Azar who requested to hold patient's Lipitor for tonight and an order put in for a CK blood draw, which was done and the CK is wnl at 304 from 852.  Dr. Azar was thinking statin myopathy.  Dr. Azar is going to let the vascular team know of tonight's finding.  Q4 hour neurovasculars will continue throughout shift.  Heparin assay at 2036 was therapeutic at 0.7.  Next heparin assay will be drawn at 0115.  Patient denies any other needs at this time.  Call light is within reach and patient resting comfortably in bed.

## 2025-07-07 NOTE — PROGRESS NOTES
"Physical Therapy    Physical Therapy Evaluation    Patient Name: Jamar Gill  MRN: [id number]  Department: [facility]   Room: 300/3009-A  Today's Date: 7/7/2025   Time Calculation  Start Time: 0935  Stop Time: 0953  Time Calculation (min): 18 min    Assessment/Plan   PT Assessment  PT Assessment Results: Impaired balance, Decreased mobility, Pain  Rehab Prognosis: Excellent  Medical Staff Made Aware: Yes  End of Session Communication: Bedside nurse  Assessment Comment: Pt's impairments include generalized weakness, impaired balance and decreased activity tolerance. Pt's functional limitations include bed mobility, transfers, gait and elevations. Pt would benefit from continued acute care PT during hospital LOS and upon dc at a low level intensity. Recommend FWW.  End of Session Patient Position: Up in chair, Alarm off, caregiver present  IP OR SWING BED PT PLAN  Inpatient or Swing Bed: Inpatient  PT Plan  Treatment/Interventions: Bed mobility, Transfer training, Gait training, Stair training, Balance training, Neuromuscular re-education, Strengthening, Endurance training, Range of motion, Therapeutic exercise, Therapeutic activity, Home exercise program, Positioning, Postural re-education  PT Plan: Ongoing PT  PT Frequency: 3 times per week  PT Discharge Recommendations: Low intensity level of continued care  Equipment Recommended upon Discharge: Wheeled walker  PT Recommended Transfer Status: Stand by assist, Other (comment) (SBA without AD; JESI with FWW)  PT - OK to Discharge: Yes (Pt ok to dc from acute care PT to next level of care once cleared by medical team.)    Subjective     PT Visit Info:  PT Received On: 07/07/25  General Visit Information:  General  Reason for Referral: Per Dr. Yañez's note, \"a 85 y.o. male with past medical history of hemochromatosis with cirrhosis, dyslipidemia, primary hypertension, COPD, CKD stage IIIb cervical radiculopathy, GERD, gout presented for left lower extremity pain and was " "found to have acute left lower limb ischemia requiring vascular intervention.  Subsequently patient was transferred to ICU for hypotension and symptomatic acute blood loss anemia.  Patient required some blood transfusion but now is hemodynamically stable and will be moving out to telemetry floor and will challenge him with heparin drip.\"  Referred By: Dr. Yañez  Past Medical History Relevant to Rehab: Medical History[1]    Family/Caregiver Present: No  Prior to Session Communication: Bedside nurse  Patient Position Received: Up in bathroom  General Comment: Pt agreeable to PT eval.  Home Living:  Home Living  Type of Home: House  Lives With: Spouse (Spouse has alzhiemers dementia, early stages. Pt reports he does not have to physically assist her.)  Home Adaptive Equipment: Walker rolling or standard, Cane  Home Layout: One level, Laundry in basement, Able to live on main level with bedroom/bathroom  Home Access:  (3 ELIZABETH with railings.)  Prior Level of Function:  Prior Function Per Pt/Caregiver Report  Level of [place]: Independent with ADLs and functional transfers, Independent with homemaking with ambulation  Prior Function Comments: At baseline, pt is IND without AD for ambulation, ADLs,IADLs. Pt has been using a FWW since hospitalization.  Precautions:  Precautions  Medical Precautions: Fall precautions    Objective   Pain:  Pain Assessment  Pain Assessment: 0-10  0-10 (Numeric) Pain Score: 0 - No pain  Cognition:  Cognition  Overall Cognitive Status: Within Functional Limits  Orientation Level: Oriented X4    General Assessments:  Activity Tolerance  Endurance: Endurance does not limit participation in activity    Sensation  Light Touch: No apparent deficits    Static Sitting Balance  Static Sitting-Comment/Number of Minutes: IND  Dynamic Sitting Balance  Dynamic Sitting-Comments: IND    Static Standing Balance  Static Standing-Comment/Number of Minutes: SBA without UE support  Dynamic Standing " Balance  Dynamic Standing-Comments: SBA without UE support for short distances. JESI with FWW for longer distances. No overt LOB episodes or pathway deviation.  Functional Assessments:  Transfers  Transfer: Yes  Transfer 1  Technique 1: Sit to stand, Stand to sit  Trials/Comments 1: x1 STS from bathroom commode with grab bar, SBAx1. x1 STS from EOB with FWW, SBAx1.    Ambulation/Gait Training  Ambulation/Gait Training Performed: Yes  Ambulation/Gait Training 1  Surface 1: Level tile  Device 1: Rolling walker  Gait Support Devices: Gait belt  Comments/Distance (ft) 1: Pt amb 1x350' with FWW, JESI with reciprocal gait, equal step length, decreased gait speed and flexed posture. FWW adjusted to pt's height.  Ambulation/Gait Training 2  Surface 2: Level tile  Device 2: No device  Gait Support Devices: Gait belt  Comments/Distance (ft) 2: Pt amb 1x 75' without AD, SBAx1 with reciprocal gait, equal step length, decreased gait speed compared to use of walker, and flexed posture. No pathway deviation or overt LOB episodes. Pt agreeable to continueing to trial no device while working with therapy.  Extremity/Trunk Assessments:  RLE   RLE : Within Functional Limits  LLE   LLE : Within Functional Limits  Outcome Measures:  [facility] Basic Mobility  Turning from your back to your side while in a flat bed without using bedrails: None  Moving from lying on your back to sitting on the side of a flat bed without using bedrails: A little  Moving to and from bed to chair (including a wheelchair): A little  Standing up from a chair using your arms (e.g. wheelchair or bedside chair): A little  To walk in hospital room: A little  Climbing 3-5 steps with railing: A little  Basic Mobility - Total Score: 19    Encounter Problems       Encounter Problems (Active)       PT Problem       Bed mobility (Progressing)       Start:  07/07/25    Expected End:  07/21/25       Pt will perform supine<>sit with HOB flat, JESI.         Transfers  (Progressing)       Start:  07/07/25    Expected End:  07/21/25       Pt will perform all transfers with LRAD, JESI.            Gait (Progressing)       Start:  07/07/25    Expected End:  07/21/25       Pt will amb 250', IND without AD with reciprocal gait, upright posture and improved activity tolerance as demonstrated by vitals.              PT Problem       stairs (Progressing)       Start:  07/07/25    Expected End:  07/21/25       Pt will ascend/descend 3 steps with LRAD, JESI.                  Education Documentation  Mobility Training, taught by Moni Camacho, PT at 7/7/2025 10:36 AM.  Learner: Patient  Readiness: Acceptance  Method: Explanation  Response: Verbalizes Understanding, Demonstrated Understanding    Education Comments  No comments found.                 [1]   Past Medical History:  Diagnosis Date    Cancer (Multi)     Chronic kidney disease     Hypertension     Other disorders of iron metabolism 09/30/2021    Increased storage iron    Personal history of other endocrine, nutritional and metabolic disease 05/10/2021    History of hyperlipidemia

## 2025-07-07 NOTE — CARE PLAN
The patient's goals for the shift include comfort/safety/rest    The clinical goals for the shift include see care plan      Problem: Skin  Goal: Promote skin healing  Flowsheets (Taken 7/7/2025 7217)  Promote skin healing: Turn/reposition every 2 hours/use positioning/transfer devices

## 2025-07-07 NOTE — PROGRESS NOTES
Jamar Gill is a 85 y.o. male on day 6 of admission presenting with Acute lower limb ischemia.      Subjective   No signs and symptoms of bleeding.  Had some foot and ankle swelling on left side.  That happened after he was in a recliner for most of the day.  Strength is improving and he is walking around.  Pulses were dopplered in both lower extremity and they are strong 2+.       Objective     Last Recorded Vitals  /71   Pulse 68   Temp 36.8 °C (98.2 °F)   Resp 18   Wt 92.1 kg (203 lb)   SpO2 98%   Intake/Output last 3 Shifts:    Intake/Output Summary (Last 24 hours) at 7/7/2025 1036  Last data filed at 7/7/2025 0941  Gross per 24 hour   Intake 905.77 ml   Output --   Net 905.77 ml       Admission Weight  Weight: 88 kg (194 lb) (07/01/25 1240)    Daily Weight  07/07/25 : 92.1 kg (203 lb)      Physical Exam:  General: Not in acute distress, alert.  On room air  HEENT: PERRLA, head intact and normocephalic  Neck: Normal to inspection  Lungs: Clear to auscultation, work of breathing within normal limit  Cardiac: Regular rate and rhythm  Abdomen: Soft nontender, positive bowel sounds  : Exam deferred  Skin: Intact  Hematology: No petechia or excessive ecchymosis  Musculoskeletal: Without significant trauma.  Slight swelling of left foot and ankle is noted  Neurological: Alert awake oriented, no focal deficit, cranial nerves grossly intact  Psych: No suicidal ideation or homicidal ideation    Relevant Results  Scheduled medications  Scheduled Medications[1]  Continuous medications  Continuous Medications[2]  PRN medications  PRN Medications[3]   Labs  Results from last 7 days   Lab Units 07/07/25  0649 07/06/25  0944 07/05/25  0513   WBC AUTO x10*3/uL 5.7 7.0 6.9   HEMOGLOBIN g/dL 7.6* 8.5* 8.2*   HEMATOCRIT % 23.4* 26.0* 24.2*   PLATELETS AUTO x10*3/uL 148* 155 102*     Results from last 7 days   Lab Units 07/07/25  0649 07/06/25  0944 07/05/25  0513 07/04/25  0305   SODIUM mmol/L 140 140 141 137    POTASSIUM mmol/L 4.4 4.6 3.8 3.7   CHLORIDE mmol/L 110* 109* 111* 109*   CO2 mmol/L 23 24 22 20*   BUN mg/dL 27* 28* 32* 43*   CREATININE mg/dL 1.50* 1.47* 1.58* 2.08*   CALCIUM mg/dL 8.6 9.0 8.5* 7.9*   PROTEIN TOTAL g/dL  --  5.7* 4.9* 4.5*   BILIRUBIN TOTAL mg/dL  --  0.6 0.4 0.3   ALK PHOS U/L  --  36 31* 28*   ALT U/L  --  32 25 22   AST U/L  --  38 43* 44*   GLUCOSE mg/dL 113* 115* 128* 119*       Imaging  CT angio lower extremity left w and or wo IV contrast  Result Date: 7/2/2025  1. At the site of previous popliteal artery occlusion, there remains a severe stenosis (> 95%) due to circumferential soft plaque. (Axial image 600, series 401). Distal to this the caliber improved but there remains large friable soft plaque extending directly into the lumen resulting in 50% stenosis on (axial image 687, series 04/01). There is minimally improved caliber of the tibioperoneal trunk demonstrating 50% stenosis distal to the anterior tibial artery takeoff. As on preoperative imaging there is persistent occlusion of the popliteal artery with only a few areas of minimal reconstitution in which the lumen the remains severely stenosed. The posterior tibial artery remains occluded in the distal leg and into the ankle. The anterior tibial and peroneal arteries remain patent into the foot. There remains poor peripheral circulation in the distal foot even on delayed images.   2. New severe thickening of the entire left vastus intermedius muscle, and to a lesser extent vastus lateralis muscle, with effacement of anterior and intramuscular fatty striations and some mild perimuscular stranding. Differential diagnosis includes compartment syndrome versus intramuscular hematoma, the latter favored which could also result in compartment syndrome. However, there is no evidence of measurable hematoma or active extravasation. As for compartment syndrome, the location is unexpected given the level of the popliteal artery occlusion  distal to this abnormality and there was no evidence of a representation process involving the SFA after intervention. Recommend correlation with hemoglobin/hematocrit that would otherwise suggest hemorrhage and urgent surgical consultation.   3. Mild ill-defined fat stranding in the left groin and along the proximal SFA representing small amount of non loculated blood products without discrete hematoma in the groin.   4. New focal mild narrowing of the proximal left SFA with smooth margins and no intimal flap or focal wall thickening is possibly related basal spasm. The remainder of the left lower extremity arterial system demonstrates no arterial dissection, focal intimal injury, pseudoaneurysm, arteriovenous fistula or hemodynamically significant stenosis.   5. Favor intramuscular blood products but both could cause compartment syndrome   MACRO: Keyur Lynn discussed the significance and urgency of this critical finding via [employer] with confirmation of receipt with KALEB OSHEA on 7/2/2025 at 7:45 pm.  (**-RCF-**) Findings:  See findings.   Signed by: Keyur Lynn 7/2/2025 7:45 PM Dictation workstation:   [id number]    CT angio lower extremity left w and or wo IV contrast  Result Date: 7/1/2025  The arterial system shows only minimal scattered mural plaquing on the left down through the superficial femoral artery.  The left popliteal artery shows some narrowing in its origin and then complete occlusion just above the level of the knee joint.  There is only minimal incomplete filling of the runoff vessels below the popliteal trifurcation level.. Signed by Markie Brito MD      Cardiology, Vascular, and Other Imaging  Transthoracic Echo Complete  Result Date: 7/5/2025            [facility] [address] [facility] [address]    Tel [phone] Fax [phone] TRANSTHORACIC ECHOCARDIOGRAM REPORT Patient Name:       MARQUES Valenzuela Physician:    [id number] Gibbons                                                                 Julieth AVILA Study Date:         7/5/2025             Ordering Provider:    [id number] AAMIR LOO MRN/PID:            [id number]             Fellow: Accession#:         [id number]         Nurse: Date of Birth/Age:  1940 / 85 years Sonographer:          Yi Machado RDCS Gender Assigned at  M                    Additional Staff: Birth: Height:             177.80 cm            Admit Date: Weight:             88.00 kg             Admission Status:     Inpatient -                                                                Routine BSA / BMI:          2.06 m2 / 27.84      Department Location:  [facility] ICU Back                     kg/m2                                      (27-34) Blood Pressure: 112 /60 mmHg Study Type:    TRANSTHORACIC ECHO (TTE) COMPLETE Diagnosis/ICD: Other chest pain-R07.89 Indication:    CP, concern for embolism CPT Codes:     Echo Complete w Full [id number] Patient History: Pertinent History: HTN, Hyperlipidemia and COPD. Study Detail: The following Echo studies were performed: 2D, M-Mode, Doppler and               color flow.  PHYSICIAN INTERPRETATION: Left Ventricle: The left ventricular systolic function is normal with a visually estimated ejection fraction of 55%. There is mild concentric left ventricular hypertrophy. There are no regional wall motion abnormalities. The left ventricular cavity size is normal. There is mild increased septal and mildly increased posterior left ventricular wall thickness. There is left ventricular concentric remodeling. Spectral Doppler shows a Grade I (impaired relaxation pattern) of left ventricular diastolic filling with normal left atrial filling pressure. Left Atrium: The left atrial size is mildly dilated. Right Ventricle: The right ventricle is normal in size. There is normal right ventricular global systolic function. Right Atrium: The right  atrial size is normal. Aortic Valve: The aortic valve is trileaflet. There is no evidence of aortic valve regurgitation. Mitral Valve: The mitral valve is normal in structure. There is mild mitral valve regurgitation. The E Vmax is 1.13 m/s. Tricuspid Valve: The tricuspid valve is structurally normal. There is trace tricuspid regurgitation. The Doppler estimated right ventricular systolic pressure (RVSP) is mildly elevated at 39 mmHg. Pulmonic Valve: The pulmonic valve is structurally normal. There is no indication of pulmonic valve regurgitation. Pericardium: No pericardial effusion noted. Aorta: The aortic root is normal. Systemic Veins: The inferior vena cava appears normal in size, with IVC inspiratory collapse greater than 50%.  CONCLUSIONS:  1. The left ventricular systolic function is normal with a visually estimated ejection fraction of 55%.  2. Spectral Doppler shows a Grade I (impaired relaxation pattern) of left ventricular diastolic filling with normal left atrial filling pressure.  3. There is normal right ventricular global systolic function.  4. The Doppler estimated RVSP is mildly elevated at 39 mmHg. QUANTITATIVE DATA SUMMARY:  2D MEASUREMENTS:             Normal Ranges: LAs:             4.09 cm     (2.7-4.0cm) IVSd:            1.18 cm     (0.6-1.1cm) LVPWd:           1.25 cm     (0.6-1.1cm) LVIDd:           4.42 cm     (3.9-5.9cm) LVIDs:           3.54 cm LV Mass Index:   94.9 g/m2 LVEDV Index:     51.57 ml/m2 LV % FS          20.0 %  LEFT ATRIUM:                 Normal Ranges: LA Area A4C:      18.0 cm2 LA Area A2C:      18.0 cm2 LA Volume Index:  26.0 ml/m2 LA Vol A4C:       47.4 ml LA Vol A2C:       48.9 ml LA Vol Index BSA: 23.4 ml/m2  M-MODE MEASUREMENTS:         Normal Ranges: AoV Exc:             2.09 cm (1.5-2.5cm)  AORTA MEASUREMENTS:         Normal Ranges: AoV Exc:            2.09 cm (1.5-2.5cm)  LV SYSTOLIC FUNCTION:                      Normal Ranges: EF-A4C View:    49 % (>=55%) EF-A2C  View:    55 % EF-Biplane:     51 % EF-Visual:      55 % LV EF Reported: 55 %  LV DIASTOLIC FUNCTION:            Normal Ranges: MV Peak E:             1.13 m/s   (0.7-1.2 m/s) MV Peak A:             1.12 m/s   (0.42-0.7 m/s) E/A Ratio:             1.01       (1.0-2.2) MV e'                  0.095 m/s  (>8.0) MV lateral e'          0.10 m/s MV medial e'           0.09 m/s E/e' Ratio:            11.87      (<8.0) PulmV Sys Trevor:         65.18 cm/s PulmV Weber Trevor:        60.03 cm/s PulmV S/D Trevor:         1.09  MITRAL VALVE:          Normal Ranges: MV DT:        202 msec (150-240msec)  AORTIC VALVE:           Normal Ranges: AoV Vmax:      1.28 m/s (<=1.7m/s) AoV Peak P.5 mmHg (<20mmHg) LVOT Max Trevor:  1.05 m/s (<=1.1m/s) LVOT VTI:      23.38 cm LVOT Diameter: 2.16 cm  (1.8-2.4cm) AoV Area,Vmax: 3.03 cm2 (2.5-4.5cm2)  RIGHT VENTRICLE: RV Basal 3.80 cm RV Mid   2.00 cm RV Major 7.3 cm TAPSE:   18.0 mm RV s'    0.17 m/s  TRICUSPID VALVE/RVSP:          Normal Ranges: Peak TR Velocity:     3.02 m/s Est. RA Pressure:     3 mmHg RV Syst Pressure:     39 mmHg  (< 30mmHg) IVC Diam:             1.95 cm  PULMONIC VALVE:         Normal Ranges: PV Accel Time:  91 msec (>120ms)  PULMONARY VEINS: PulmV Weber Trevor: 60.03 cm/s PulmV S/D Trevor:  1.09 PulmV Sys Trevor:  65.18 cm/s  AORTA: Asc Ao Diam 3.04 cm  [id number] Shai Clancy MD Electronically signed on 2025 at 3:43:34 PM  ** Final **     Vascular US Ankle Brachial Index (JUAN) Without Exercise  Result Date: 2025            [facility] [address] [address] [place] [address]     Tel [phone] Fax [phone]  Vascular Lab Report  VAS US ANKLE BRACHIAL INDEX (JUAN) WITHOUT EXERCISE Patient Name:      MARQUES Valenzuela Physician:  [id number] Tatiana Copeland MD Study Date:        7/3/2025             Ordering Provider:  [id number] KALEB OSHEA MRN/PID:           [id number]             Fellow: Accession#:         [id number]         Technologist:       Yolette Dean RVT, RDMS Date of Birth/Age: 1940 / 85 years Technologist 2: Gender:            M                    Encounter#:         [id number] Admission Status:  Inpatient            Location Performed: [facility]  Diagnosis/ICD: Atherosclerosis of native arteries of extremities with rest pain,                left leg-I70.222 Indication:    Post LLE intervention  Smoker:            Never. Pertinent History: Previous Vasc Surg, HTN and Hyperlipidemia. post LLE                    angiogram with left Pop A & tib A thrombectomy & angioplasty                    to distal FA & DEBORA.  CONCLUSIONS: Right Lower PVR: No evidence of arterial occlusive disease in the right lower extremity at rest. Normal digital perfusion noted. Multiphasic flow is noted in the right common femoral artery, right posterior tibial artery and right dorsalis pedis artery. The right digit tracing is diminished. Left Lower PVR: Evidence of mild arterial occlusive disease in the left lower extremity at rest. Decreased digital perfusion noted. Monophasic flow is noted in the left posterior tibial artery and left dorsalis pedis artery. Multiphasic flow is noted in the left common femoral artery.  Imaging & Doppler Findings:  RIGHT Lower PVR                Pressures Ratios Right Posterior Tibial (Ankle) 139 mmHg  0.99 Right Dorsalis Pedis (Ankle)   117 mmHg  0.84 Right Digit (Great Toe)        90 mmHg   0.64   LEFT Lower PVR                Pressures Ratios Left Posterior Tibial (Ankle) 78 mmHg   0.56 Left Dorsalis Pedis (Ankle)   106 mmHg  0.76 Left Digit (Great Toe)        80 mmHg   0.57                      Right     Left Brachial Pressure 135 mmHg 140 mmHg   [id number] Tatiana Copeland MD Electronically signed by [id number] Tatiana Copeland MD on 7/4/2025 at 9:15:04 AM  ** Final **     ECG 12 lead  Result Date: 7/2/2025  Sinus rhythm with 1st  degree AV block with Premature atrial complexes Right bundle branch block Left anterior fascicular block ** Bifascicular block ** Septal infarct (cited on or before 01-JUL-2025) Abnormal ECG When compared with ECG of 08-JUN-2025 14:40, Premature atrial complexes are now Present Questionable change in initial forces of Septal leads Nonspecific T wave abnormality, worse in Lateral leads                      Assessment/Plan   Jamar Gill is a 85 y.o. male with past medical history of hemochromatosis with cirrhosis, dyslipidemia, primary hypertension, COPD, CKD stage IIIb cervical radiculopathy, GERD, gout presented for left lower extremity pain and was found to have acute left lower limb ischemia requiring vascular intervention.  Subsequently patient was transferred to ICU for hypotension and symptomatic acute blood loss anemia.  Patient required some blood transfusion but now is hemodynamically stable and not having any active bleeding.  He was challenged with heparin drip for 24 hours and then transitioning to Eliquis.  He has no signs and symptoms of active bleeding.  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  Status post intervention by vascular surgery-patient had thrombectomy and balloon angioplasty on 7/2/2025  Cannula patient to telemetry  Hemoglobin did drop down but no signs and symptoms of bleeding  Will start patient on Eliquis 2.5 mg twice a day with age being greater than 80 and his creatinine being 1.5  We will repeat hemoglobin around 2 PM today and repeat tomorrow  Discussed with vascular surgery regarding left foot and ankle swelling  Pulses are intact  Will maintain Plavix and discontinue aspirin as per discussion  If hemoglobin remaining stable will consider discharge tomorrow  Patient with underlying hemochromatosis and cirrhosis     Hypotension secondary to symptomatic acute blood loss anemia-hemorrhagic shock  H&H is slightly  down but no signs and symptoms of bleeding  Patient denies any dark or tarry stools  He did have a change in his stool color and consistency prior to his hospitalization but they were never black tarry or bloody  On anticoagulation  PPI twice a day  Hypotension has resolved  We will start patient on amlodipine  Discussed the side effect of amlodipine with ankle and foot swelling and will monitor closely    LISBET on chronic kidney disease  Will hold off on lisinopril and hydrochlorothiazide for now     Hyperlipidemia  Continue with fenofibrate  Continue with statin  CK level has normalized     Hypertension  Blood pressure is improving  Continue to hold lisinopril and hydrochlorothiazide  Starting patient on amlodipine     DVT prophylaxis  SCDs and ambulation  Starting patient on Eliquis 2.5 mg twice a day     Plan discussed with patient and primary nurse at bedside on telemetry floor.    High level of MDM based on above issue and discussing plan    This note is created using voice recognition software. All efforts are made to minimize errors, if there are errors there due to transcription.    Bayron Yañez  Hospitalist               [1] allopurinol, 300 mg, oral, Daily  amLODIPine, 5 mg, oral, Daily  apixaban, 2.5 mg, oral, q12h  atorvastatin, 80 mg, oral, Nightly  clopidogrel, 75 mg, oral, Daily  fenofibrate, 160 mg, oral, Daily  melatonin, 3 mg, oral, Nightly  morphine, 4 mg, intravenous, Once  pantoprazole, 40 mg, oral, BID AC  polyethylene glycol, 17 g, oral, BID  venlafaxine XR, 75 mg, oral, Daily     [2]    [3] PRN medications: acetaminophen **OR** acetaminophen **OR** acetaminophen, alteplase, dextrose, dextrose, glucagon, glucagon, HYDROmorphone, ondansetron **OR** ondansetron

## 2025-07-07 NOTE — PROGRESS NOTES
07/07/25 1610   Discharge Planning   Living Arrangements Spouse/significant other   Support Systems Spouse/significant other;Children   Assistance Needed none   Type of Residence Private residence   Home or Post Acute Services None   Expected Discharge Disposition Home   Does the patient need discharge transport arranged? No   Stroke Family Assessment   Stroke Family Assessment Needed No   Intensity of Service   Intensity of Service 0-30 min     Met with the patient earlier today, he feels that he will manage at home after discharge. He does not feel that he needs HHC currently. Noted AMPAC of 19 and 24.

## 2025-07-07 NOTE — CARE PLAN
Problem: Safety - Adult  Goal: Free from fall injury  Outcome: Progressing     The patient's goals for the shift include comfort/safety/rest    The clinical goals for the shift include see POC

## 2025-07-07 NOTE — PROGRESS NOTES
Occupational Therapy    Occupational Therapy    Evaluation    Patient Name: Jamar Gill  MRN: [id number]  Today's Date: 7/7/2025  Time Calculation  Start Time: 1121  Stop Time: 1130  Time Calculation (min): 9 min  3009/3009-A    Assessment  IP OT Assessment  OT Assessment:  (Pt. has no skilled OT needs)  Barriers to Discharge Home: No anticipated barriers  Evaluation/Treatment Tolerance: Patient tolerated treatment well  End of Session Communication: Bedside nurse  End of Session Patient Position: Up in chair, Alarm off, caregiver present (RN said alarm can be taken off)    Plan:  OT Frequency: OT eval only  OT Discharge Recommendations: No OT needed after discharge, No further acute OT  Equipment Recommended upon Discharge: Wheeled walker  OT - OK to Discharge: Yes (Next level of care when cleared by medical team)    Subjective     Current Problem:  1. Acute lower limb ischemia  Case Request Cath Lab: Lower Extremity Angiogram    Case Request Cath Lab: Lower Extremity Angiogram    Invasive vascular procedure    Invasive vascular procedure    Vascular US Ankle Brachial Index (JUAN) Without Exercise    Vascular US Ankle Brachial Index (JUAN) Without Exercise    CANCELED: Vascular US ankle brachial index (JUAN) without exercise    CANCELED: Vascular US ankle brachial index (JUAN) without exercise      2. Atypical chest pain  Transthoracic Echo Complete    Transthoracic Echo Complete      3. Critical limb ischemia of left lower extremity  Vascular US Ankle Brachial Index (JUAN) Without Exercise    Vascular US Ankle Brachial Index (JUAN) Without Exercise    CANCELED: Vascular US ankle brachial index (JUAN) without exercise    CANCELED: Vascular US ankle brachial index (JUAN) without exercise          General:  General  Reason for Referral: ADLs,discharge planning  Referred By: Dr. Yañez  Family/Caregiver Present: Yes  Caregiver Feedback:  (Son present)  Prior to Session Communication: Bedside nurse  Patient Position Received:  Up in chair, Alarm on    Precautions:  Medical Precautions: Fall precautions        Pain:  Pain Assessment  Pain Assessment: 0-10  0-10 (Numeric) Pain Score: 0 - No pain    Objective     Cognition:  Overall Cognitive Status: Within Functional Limits  Orientation Level: Oriented X4             Home Living:  Home Living Comments:  (Pt. lives with spouse in home with first floor set up with walk in shower.)     Prior Function:  Level of [place]: Independent with ADLs and functional transfers  ADL Assistance: Independent  Homemaking Assistance: Independent  Ambulatory Assistance: Independent      ADL:  Grooming Assistance: Modified independent (Device)  UE Dressing Assistance: Modified independent (Device)  UE Dressing Deficit: Pull around back, Pull down in back, Thread RUE, Thread LUE    Activity Tolerance:  Endurance: Endurance does not limit participation in activity    Bed Mobility/Transfers:      Transfers  Transfer:  (sit<>stand MOD I)    Ambulation/Gait Training:  Functional Mobility  Functional Mobility Performed:  (Compeletes functional mobility with ww in halls MOD I)    Sitting Balance:  Static Sitting Balance  Static Sitting-Balance Support: No upper extremity supported  Static Sitting-Level of Assistance: Independent    Standing Balance:  Static Standing Balance  Static Standing-Balance Support: Bilateral upper extremity supported  Static Standing-Level of Assistance: Modified Independent      Sensation:  Sensation Comment: Denies numbness      Hand Function:  Hand Function  Gross Grasp: Functional  Coordination: Functional    Extremities: RUE   RUE : Within Functional Limits and LUE   LUE: Within Functional Limits    Outcome Measures: [facility] Daily Activity  Putting on and taking off regular lower body clothing: None  Bathing (including washing, rinsing, drying): None  Putting on and taking off regular upper body clothing: None  Toileting, which includes using toilet, bedpan or urinal: None  Taking  care of personal grooming such as brushing teeth: None  Eating Meals: None  Daily Activity - Total Score: 24                       EDUCATION:  Education  Individual(s) Educated: Patient  Education Provided: Fall precautons, Risk and benefits of OT discussed with patient or other  Patient Response to Education: Patient/Caregiver Verbalized Understanding of Information      Goals:   NA

## 2025-07-07 NOTE — CARE PLAN
The patient's goals for the shift include comfort/safety/rest    The clinical goals for the shift include see care plan      Problem: Skin  Goal: Promote skin healing  7/7/2025 0740 by Yo Escobedo RN  Flowsheets (Taken 7/7/2025 0740)  Promote skin healing: Turn/reposition every 2 hours/use positioning/transfer devices  7/7/2025 0739 by Yo Escobedo RN  Flowsheets (Taken 7/7/2025 0739)  Promote skin healing: Turn/reposition every 2 hours/use positioning/transfer devices

## 2025-07-07 NOTE — PROGRESS NOTES
86yo patient [place] [facility] [place] LLE pain and palor x 1 week concerning for acute limb ischemia 2/2 left popliteal artery, PT, and distal AT occlusion s/p lower extremity angiogram w/thrombectomy of left popliteal artery and PARKER, balloon angioplasty of AT & TPT, and drug coated balloon angioplasty to dSFA/pop (7/2/25).     Subjective Data:  Presents unaccompanied, alone in bedside chair watching the news. Denies abdominal pain and/or back pain. Endorses left thigh stiffness when he first starts to ambulate but improves as he walks. Endorses left pedal edema overnight. Denies pain. Left pedal paresthesias improving.     Overnight Events:    Afebrile. Normotensive. Left pedal edema. CK ordered, continues to trend down.      Objective Data:  Last Recorded Vitals:  Vitals:    07/07/25 0400 07/07/25 0552 07/07/25 0800 07/07/25 1200   BP: 145/69  146/71 119/65   BP Location: Right arm      Patient Position: Lying      Pulse: 59  68 71   Resp: 18  18 18   Temp: 36.8 °C (98.2 °F)  36.8 °C (98.2 °F) 36.9 °C (98.4 °F)   TempSrc: Temporal      SpO2: 98%  98% 100%   Weight:  92.1 kg (203 lb)     Height:         Last I/O:  I/O last 3 completed shifts:  In: 653.5 (7.1 mL/kg) [P.O.:360; I.V.:293.5 (3.2 mL/kg)]  Out: - (0 mL/kg)   Weight: 92.1 kg     Physical Exam  Vitals reviewed.   Constitutional:       General: He is awake.      Appearance: He is overweight.   HENT:      Head: Normocephalic.      Nose: Nose normal.      Mouth/Throat:      Mouth: Mucous membranes are moist.   Eyes:      Conjunctiva/sclera: Conjunctivae normal.   Cardiovascular:      Rate and Rhythm: Normal rate and regular rhythm.      Pulses:           Femoral pulses are 2+ on the right side and 2+ on the left side.       Dorsalis pedis pulses are 2+ on the right side and detected w/ Doppler on the left side.        Posterior tibial pulses are detected w/ Doppler on the left side.      Comments: Right groin with dressing intact.   Pulmonary:      Effort:  Pulmonary effort is normal.      Breath sounds: Normal breath sounds.   Musculoskeletal:      Left lower le+ Edema present.   Feet:      Left foot:      Skin integrity: No ulcer, blister, skin breakdown, erythema or warmth.   Skin:     General: Skin is warm and dry.   Neurological:      Mental Status: He is alert and oriented to person, place, and time.   Psychiatric:         Mood and Affect: Mood normal.         Behavior: Behavior is cooperative.       Last Labs:  CBC - 2025: 12:29 PM  7.7 9.3 207    28.5      CMP - 2025:  6:49 AM  8.6 5.7 38 --- 0.6   2.8 3.5 32 36      PTT - 2025:  2:07 PM  1.0   11.0 27     TROPHS   Date/Time Value Ref Range Status   2025 03:57 PM 12 0 - 20 ng/L Final   2025 03:18 PM 14 0 - 20 ng/L Final   2025 04:39 PM 12 0 - 20 ng/L Final     BNP   Date/Time Value Ref Range Status   2025 03:18 PM 12 0 - 99 pg/mL Final   2025 02:29 PM 97 0 - 99 pg/mL Final     HGBA1C   Date/Time Value Ref Range Status   2022 10:06 AM 5.4 % Final     Comment:          Diagnosis of Diabetes-Adults   Non-Diabetic: < or = 5.6%   Increased risk for developing diabetes: 5.7-6.4%   Diagnostic of diabetes: > or = 6.5%  .       Monitoring of Diabetes                Age (y)     Therapeutic Goal (%)   Adults:          >18           <7.0   Pediatrics:    13-18           <7.5                   7-12           <8.0                   0- 6            7.5-8.5   American Diabetes Association. Diabetes Care 33(S1), 2010.     2022 10:54 AM 5.4 % Final     Comment:          Diagnosis of Diabetes-Adults   Non-Diabetic: < or = 5.6%   Increased risk for developing diabetes: 5.7-6.4%   Diagnostic of diabetes: > or = 6.5%  .       Monitoring of Diabetes                Age (y)     Therapeutic Goal (%)   Adults:          >18           <7.0   Pediatrics:    13-18           <7.5                   7-12           <8.0                   0- 6            7.5-8.5   American Diabetes  Association. Diabetes Care 33(S1), Jan 2010.       LDLCALC   Date/Time Value Ref Range Status   09/25/2024 01:07 PM   Final     Comment:     The calculation of LDL and VLDL are inaccurate when the Triglycerides are greater than 400 mg/dL or when the patient is non-fasting. If LDL measurement is necessary contact the testing laboratory for an alternative LDL assay.                                  Near   Borderline      AGE      Desirable  Optimal    High     High     Very High     0-19 Y     0 - 109     ---    110-129   >/= 130     ----    20-24 Y     0 - 119     ---    120-159   >/= 160     ----      >24 Y     0 -  99   100-129  130-159   160-189     >/=190     03/20/2024 10:31 AM   Final     Comment:     The calculation of LDL and VLDL are inaccurate when the Triglycerides are greater than 400 mg/dL or when the patient is non-fasting. If LDL measurement is necessary contact the testing laboratory for an alternative LDL assay.                                  Near   Borderline      AGE      Desirable  Optimal    High     High     Very High     0-19 Y     0 - 109     ---    110-129   >/= 130     ----    20-24 Y     0 - 119     ---    120-159   >/= 160     ----      >24 Y     0 -  99   100-129  130-159   160-189     >/=190       VLDL   Date/Time Value Ref Range Status   09/25/2024 01:07 PM   Final     Comment:     Unable to calculate VLDL.   03/20/2024 10:31 AM   Final     Comment:     Unable to calculate VLDL.   09/20/2023 10:15 AM 79 0 - 40 mg/dL Final   03/20/2023 11:42 AM SEE COMMENT 0 - 40 mg/dL Final     Comment:       Unable to calculate VLDL.   09/01/2022 10:06 AM SEE COMMENT 0 - 40 mg/dL Final     Comment:       Unable to calculate VLDL.      Diagnostic Imaging:   VASC US ANKLE BRACHIAL INDEX (JUAN) WITHOUT EXERCISE     Patient Name:      MARQUES Valenzuela Physician:  [id number] Tatiana Copeland MD  Study Date:        7/3/2025              Ordering Provider:  [id number] KALEB OSHEA  MRN/PID:           [id number]             Fellow:  Accession#:        [id number]         Technologist:       Yolette Dean RVT, RDMS  Date of Birth/Age: 1940 / 85 years Technologist 2:  Gender:            M                    Encounter#:         [id number]  Admission Status:  Inpatient            Location Performed: [facility]        Diagnosis/ICD: Atherosclerosis of native arteries of extremities with rest pain,                 left leg-I70.222  Indication:    Post LLE intervention        Smoker:            Never.  Pertinent History: Previous Vasc Surg, HTN and Hyperlipidemia. post LLE                     angiogram with left Pop A & tib A thrombectomy & angioplasty                     to distal FA & DEBORA.        CONCLUSIONS:  Right Lower PVR: No evidence of arterial occlusive disease in the right lower extremity at rest. Normal digital perfusion noted. Multiphasic flow is noted in the right common femoral artery, right posterior tibial artery and right dorsalis pedis artery. The right digit tracing is diminished.  Left Lower PVR: Evidence of mild arterial occlusive disease in the left lower extremity at rest. Decreased digital perfusion noted. Monophasic flow is noted in the left posterior tibial artery and left dorsalis pedis artery. Multiphasic flow is noted in the left common femoral artery.     Imaging & Doppler Findings:     RIGHT Lower PVR                Pressures Ratios  Right Posterior Tibial (Ankle) 139 mmHg  0.99  Right Dorsalis Pedis (Ankle)   117 mmHg  0.84  Right Digit (Great Toe)        90 mmHg   0.64           LEFT Lower PVR                Pressures Ratios  Left Posterior Tibial (Ankle) 78 mmHg   0.56  Left Dorsalis Pedis (Ankle)   106 mmHg  0.76  Left Digit (Great Toe)        80 mmHg   0.57                              Right     Left  Brachial Pressure 135 mmHg 140 mmHg            [id number] Tatiana Copeland MD  Electronically signed by [id number] Tatiana Copeland MD on 7/4/2025 at 9:15:04 AM   TRANSTHORACIC ECHOCARDIOGRAM REPORT     Patient Name:       MARQUES Valenzuela Physician:    [id number] Shai Clancy MD  Study Date:         7/5/2025             Ordering Provider:    [id number] AAMIR LOO  MRN/PID:            [id number]             Fellow:  Accession#:         [id number]         Nurse:  Date of Birth/Age:  1940 / 85 years Sonographer:          Yi Machado                                                                 RD  Gender Assigned at                      Additional Staff:  Birth:  Height:             177.80 cm            Admit Date:  Weight:             88.00 kg             Admission Status:     Inpatient -                                                                 Routine  BSA / BMI:          2.06 m2 / 27.84      Department Location:  [facility] ICU Back                      kg/m2                                      (27-34)  Blood Pressure: 112 /60 mmHg     Study Type:    TRANSTHORACIC ECHO (TTE) COMPLETE  Diagnosis/ICD: Other chest pain-R07.89  Indication:    CP, concern for embolism  CPT Codes:     Echo Complete w Full [id number]     Patient History:  Pertinent History: HTN, Hyperlipidemia and COPD.     Study Detail: The following Echo studies were performed: 2D, M-Mode, Doppler and                color flow.        PHYSICIAN INTERPRETATION:  Left Ventricle: The left ventricular systolic function is normal with a visually estimated ejection fraction of 55%. There is mild concentric left ventricular hypertrophy. There are no regional wall motion abnormalities. The left ventricular cavity size is normal. There is mild increased septal and mildly increased posterior left ventricular wall thickness. There is left ventricular concentric remodeling. Spectral Doppler shows a Grade I (impaired relaxation pattern) of  left ventricular diastolic filling with normal left atrial filling pressure.  Left Atrium: The left atrial size is mildly dilated.  Right Ventricle: The right ventricle is normal in size. There is normal right ventricular global systolic function.  Right Atrium: The right atrial size is normal.  Aortic Valve: The aortic valve is trileaflet. There is no evidence of aortic valve regurgitation.  Mitral Valve: The mitral valve is normal in structure. There is mild mitral valve regurgitation. The E Vmax is 1.13 m/s.  Tricuspid Valve: The tricuspid valve is structurally normal. There is trace tricuspid regurgitation. The Doppler estimated right ventricular systolic pressure (RVSP) is mildly elevated at 39 mmHg.  Pulmonic Valve: The pulmonic valve is structurally normal. There is no indication of pulmonic valve regurgitation.  Pericardium: No pericardial effusion noted.  Aorta: The aortic root is normal.  Systemic Veins: The inferior vena cava appears normal in size, with IVC inspiratory collapse greater than 50%.        CONCLUSIONS:   1. The left ventricular systolic function is normal with a visually estimated ejection fraction of 55%.   2. Spectral Doppler shows a Grade I (impaired relaxation pattern) of left ventricular diastolic filling with normal left atrial filling pressure.   3. There is normal right ventricular global systolic function.   4. The Doppler estimated RVSP is mildly elevated at 39 mmHg.     QUANTITATIVE DATA SUMMARY:     2D MEASUREMENTS:             Normal Ranges:  LAs:             4.09 cm     (2.7-4.0cm)  IVSd:            1.18 cm     (0.6-1.1cm)  LVPWd:           1.25 cm     (0.6-1.1cm)  LVIDd:           4.42 cm     (3.9-5.9cm)  LVIDs:           3.54 cm  LV Mass Index:   94.9 g/m2  LVEDV Index:     51.57 ml/m2  LV % FS          20.0 %        LEFT ATRIUM:                 Normal Ranges:  LA Area A4C:      18.0 cm2  LA Area A2C:      18.0 cm2  LA Volume Index:  26.0 ml/m2  LA Vol A4C:       47.4 ml  LA  Vol A2C:       48.9 ml  LA Vol Index BSA: 23.4 ml/m2        M-MODE MEASUREMENTS:         Normal Ranges:  AoV Exc:             2.09 cm (1.5-2.5cm)        AORTA MEASUREMENTS:         Normal Ranges:  AoV Exc:            2.09 cm (1.5-2.5cm)        LV SYSTOLIC FUNCTION:                       Normal Ranges:  EF-A4C View:    49 % (>=55%)  EF-A2C View:    55 %  EF-Biplane:     51 %  EF-Visual:      55 %  LV EF Reported: 55 %        LV DIASTOLIC FUNCTION:            Normal Ranges:  MV Peak E:             1.13 m/s   (0.7-1.2 m/s)  MV Peak A:             1.12 m/s   (0.42-0.7 m/s)  E/A Ratio:             1.01       (1.0-2.2)  MV e'                  0.095 m/s  (>8.0)  MV lateral e'          0.10 m/s  MV medial e'           0.09 m/s  E/e' Ratio:            11.87      (<8.0)  PulmV Sys Trevor:         65.18 cm/s  PulmV Weber Trevor:        60.03 cm/s  PulmV S/D Trevor:         1.09        MITRAL VALVE:          Normal Ranges:  MV DT:        202 msec (150-240msec)        AORTIC VALVE:           Normal Ranges:  AoV Vmax:      1.28 m/s (<=1.7m/s)  AoV Peak P.5 mmHg (<20mmHg)  LVOT Max Trevor:  1.05 m/s (<=1.1m/s)  LVOT VTI:      23.38 cm  LVOT Diameter: 2.16 cm  (1.8-2.4cm)  AoV Area,Vmax: 3.03 cm2 (2.5-4.5cm2)        RIGHT VENTRICLE:  RV Basal 3.80 cm  RV Mid   2.00 cm  RV Major 7.3 cm  TAPSE:   18.0 mm  RV s'    0.17 m/s        TRICUSPID VALVE/RVSP:          Normal Ranges:  Peak TR Velocity:     3.02 m/s  Est. RA Pressure:     3 mmHg  RV Syst Pressure:     39 mmHg  (< 30mmHg)  IVC Diam:             1.95 cm        PULMONIC VALVE:         Normal Ranges:  PV Accel Time:  91 msec (>120ms)        PULMONARY VEINS:  PulmV Weber Trevor: 60.03 cm/s  PulmV S/D Trevor:  1.09  PulmV Sys Trevor:  65.18 cm/s        AORTA:  Asc Ao Diam 3.04 cm        [id number] Shai Clancy MD  Electronically signed on 2025 at 3:43:34 PM     Inpatient Medications:  Scheduled Medications[1]  PRN Medications[2]  Continuous Medications[3]     Assessment/Plan   Acute limb ischemia 2/2  left popliteal artery, PT, and distal AT occlusion  s/p lower extremity angiogram w/thrombectomy of left popliteal artery and PARKER, ballon angioplasty of AT & TPT, and drug coated baloon angioplasty to dSFA/pop (7/2/25)  Denies abdominal pain. Denies back pain. Endorses improvement in left thigh pain. Endorse mild left plantar surface paresthesias  and edema, improving.   Right groin w/scant sanguineous drainage no visible ecchymosis w/dressing intact. Left thigh edema improved.  Able to palpate right DP. Able to detect left DP with doppler. +1 pitting LLE edema.   CTA LLE 7/2/25 At the site of previous popliteal artery occlusion, there remains a severe stenosis (> 95%) due to circumferential soft plaque.  Distal to this the caliber improved but there remains large friable soft plaque extending directly into the lumen resulting in 50% stenosis on. There is minimally improved caliber of the tibioperoneal trunk demonstrating 50% stenosis distal to the anterior tibial artery takeoff. New severe thickening of the entire left vastus intermedius muscle, and to a lesser extent vastus lateralis muscle, with effacement of anterior and intramuscular fatty striations and some mild perimuscular stranding.  Mild ill-defined fat stranding in the left groin and along the proximal SFA representing small amount of non loculated blood products without discrete hematoma in the groin.  JUAN 7/3/25 Evidence of mild arterial occlusive disease in the left lower extremity at rest. Decreased digital perfusion noted.   Continue Eliquis 5mg BID. Will check CBC tomorrow morning.   Follow up as scheduled with Dr. Sonia Ritter on 8/12/25.     Discussed plan of care with Dr. Sonia Hopkins.   I spent 25 minutes in the professional and overall care of this patient.     Jeanette Wiseman, BRISA-CNP  Vascular Surgery   [facility] [place]         [1]   Scheduled  medications   Medication Dose Route Frequency    allopurinol  300 mg oral Daily    amLODIPine  5 mg oral Daily    apixaban  5 mg oral BID    atorvastatin  80 mg oral Nightly    clopidogrel  75 mg oral Daily    fenofibrate  160 mg oral Daily    melatonin  3 mg oral Nightly    morphine  4 mg intravenous Once    pantoprazole  40 mg oral BID AC    polyethylene glycol  17 g oral BID    venlafaxine XR  75 mg oral Daily   [2]   PRN medications   Medication    acetaminophen    Or    acetaminophen    Or    acetaminophen    alteplase    dextrose    dextrose    glucagon    glucagon    HYDROmorphone    ondansetron    Or    ondansetron   [3]   Continuous Medications   Medication Dose Last Rate

## 2025-07-08 ENCOUNTER — APPOINTMENT (OUTPATIENT)
Dept: CARDIOLOGY | Facility: HOSPITAL | Age: 85
DRG: 270 | End: 2025-07-08
Payer: MEDICARE

## 2025-07-08 ENCOUNTER — PHARMACY VISIT (OUTPATIENT)
Dept: PHARMACY | Facility: CLINIC | Age: 85
End: 2025-07-08
Payer: COMMERCIAL

## 2025-07-08 VITALS
DIASTOLIC BLOOD PRESSURE: 63 MMHG | WEIGHT: 198.85 LBS | HEIGHT: 72 IN | TEMPERATURE: 96.8 F | BODY MASS INDEX: 26.93 KG/M2 | OXYGEN SATURATION: 100 % | RESPIRATION RATE: 18 BRPM | HEART RATE: 67 BPM | SYSTOLIC BLOOD PRESSURE: 161 MMHG

## 2025-07-08 LAB
ANION GAP SERPL CALC-SCNC: 12 MMOL/L (ref 10–20)
BASOPHILS # BLD AUTO: 0.02 X10*3/UL (ref 0–0.1)
BASOPHILS NFR BLD AUTO: 0.4 %
BODY SURFACE AREA: 2.14 M2
BUN SERPL-MCNC: 26 MG/DL (ref 6–23)
CALCIUM SERPL-MCNC: 9.2 MG/DL (ref 8.6–10.3)
CHLORIDE SERPL-SCNC: 111 MMOL/L (ref 98–107)
CO2 SERPL-SCNC: 23 MMOL/L (ref 21–32)
CREAT SERPL-MCNC: 1.58 MG/DL (ref 0.5–1.3)
EGFRCR SERPLBLD CKD-EPI 2021: 43 ML/MIN/1.73M*2
EOSINOPHIL # BLD AUTO: 0.2 X10*3/UL (ref 0–0.4)
EOSINOPHIL NFR BLD AUTO: 3.7 %
ERYTHROCYTE [DISTWIDTH] IN BLOOD BY AUTOMATED COUNT: 16.2 % (ref 11.5–14.5)
GLUCOSE SERPL-MCNC: 108 MG/DL (ref 74–99)
HCT VFR BLD AUTO: 25.9 % (ref 41–52)
HGB BLD-MCNC: 8.3 G/DL (ref 13.5–17.5)
HOLD SPECIMEN: NORMAL
IMM GRANULOCYTES # BLD AUTO: 0.03 X10*3/UL (ref 0–0.5)
IMM GRANULOCYTES NFR BLD AUTO: 0.6 % (ref 0–0.9)
LYMPHOCYTES # BLD AUTO: 1.63 X10*3/UL (ref 0.8–3)
LYMPHOCYTES NFR BLD AUTO: 30.1 %
MAGNESIUM SERPL-MCNC: 1.41 MG/DL (ref 1.6–2.4)
MCH RBC QN AUTO: 30.4 PG (ref 26–34)
MCHC RBC AUTO-ENTMCNC: 32 G/DL (ref 32–36)
MCV RBC AUTO: 95 FL (ref 80–100)
MONOCYTES # BLD AUTO: 0.45 X10*3/UL (ref 0.05–0.8)
MONOCYTES NFR BLD AUTO: 8.3 %
NEUTROPHILS # BLD AUTO: 3.09 X10*3/UL (ref 1.6–5.5)
NEUTROPHILS NFR BLD AUTO: 56.9 %
NRBC BLD-RTO: 0 /100 WBCS (ref 0–0)
PLATELET # BLD AUTO: 173 X10*3/UL (ref 150–450)
POTASSIUM SERPL-SCNC: 4.5 MMOL/L (ref 3.5–5.3)
RBC # BLD AUTO: 2.73 X10*6/UL (ref 4.5–5.9)
SODIUM SERPL-SCNC: 141 MMOL/L (ref 136–145)
WBC # BLD AUTO: 5.4 X10*3/UL (ref 4.4–11.3)

## 2025-07-08 PROCEDURE — 83735 ASSAY OF MAGNESIUM: CPT | Performed by: INTERNAL MEDICINE

## 2025-07-08 PROCEDURE — 99239 HOSP IP/OBS DSCHRG MGMT >30: CPT | Performed by: INTERNAL MEDICINE

## 2025-07-08 PROCEDURE — 2500000002 HC RX 250 W HCPCS SELF ADMINISTERED DRUGS (ALT 637 FOR MEDICARE OP, ALT 636 FOR OP/ED): Performed by: INTERNAL MEDICINE

## 2025-07-08 PROCEDURE — 2500000001 HC RX 250 WO HCPCS SELF ADMINISTERED DRUGS (ALT 637 FOR MEDICARE OP): Performed by: INTERNAL MEDICINE

## 2025-07-08 PROCEDURE — 2500000004 HC RX 250 GENERAL PHARMACY W/ HCPCS (ALT 636 FOR OP/ED): Performed by: INTERNAL MEDICINE

## 2025-07-08 PROCEDURE — RXMED WILLOW AMBULATORY MEDICATION CHARGE

## 2025-07-08 PROCEDURE — 93270 REMOTE 30 DAY ECG REV/REPORT: CPT

## 2025-07-08 PROCEDURE — 99231 SBSQ HOSP IP/OBS SF/LOW 25: CPT | Performed by: NURSE PRACTITIONER

## 2025-07-08 PROCEDURE — 36415 COLL VENOUS BLD VENIPUNCTURE: CPT | Performed by: INTERNAL MEDICINE

## 2025-07-08 PROCEDURE — 85025 COMPLETE CBC W/AUTO DIFF WBC: CPT | Performed by: INTERNAL MEDICINE

## 2025-07-08 PROCEDURE — 80048 BASIC METABOLIC PNL TOTAL CA: CPT | Performed by: INTERNAL MEDICINE

## 2025-07-08 RX ORDER — CLOPIDOGREL BISULFATE 75 MG/1
75 TABLET ORAL DAILY
Qty: 30 TABLET | Refills: 0 | Status: SHIPPED | OUTPATIENT
Start: 2025-07-09 | End: 2025-08-08

## 2025-07-08 RX ORDER — ATORVASTATIN CALCIUM 80 MG/1
80 TABLET, FILM COATED ORAL NIGHTLY
Qty: 30 TABLET | Refills: 0 | Status: SHIPPED | OUTPATIENT
Start: 2025-07-08 | End: 2025-08-07

## 2025-07-08 RX ORDER — LANOLIN ALCOHOL/MO/W.PET/CERES
800 CREAM (GRAM) TOPICAL ONCE
Status: COMPLETED | OUTPATIENT
Start: 2025-07-08 | End: 2025-07-08

## 2025-07-08 RX ORDER — AMLODIPINE BESYLATE 10 MG/1
10 TABLET ORAL DAILY
Qty: 30 TABLET | Refills: 0 | Status: SHIPPED | OUTPATIENT
Start: 2025-07-09 | End: 2025-08-08

## 2025-07-08 RX ADMIN — AMLODIPINE BESYLATE 5 MG: 5 TABLET ORAL at 08:42

## 2025-07-08 RX ADMIN — APIXABAN 5 MG: 5 TABLET, FILM COATED ORAL at 08:42

## 2025-07-08 RX ADMIN — VENLAFAXINE HYDROCHLORIDE 75 MG: 75 CAPSULE, EXTENDED RELEASE ORAL at 08:42

## 2025-07-08 RX ADMIN — CLOPIDOGREL 75 MG: 75 TABLET ORAL at 08:42

## 2025-07-08 RX ADMIN — FENOFIBRATE 160 MG: 160 TABLET ORAL at 08:42

## 2025-07-08 RX ADMIN — PANTOPRAZOLE SODIUM 40 MG: 40 TABLET, DELAYED RELEASE ORAL at 06:14

## 2025-07-08 RX ADMIN — Medication 2 TABLET: at 10:15

## 2025-07-08 RX ADMIN — ALLOPURINOL 300 MG: 300 TABLET ORAL at 08:42

## 2025-07-08 SDOH — ECONOMIC STABILITY: FOOD INSECURITY: WITHIN THE PAST 12 MONTHS, THE FOOD YOU BOUGHT JUST DIDN'T LAST AND YOU DIDN'T HAVE MONEY TO GET MORE.: NEVER TRUE

## 2025-07-08 SDOH — ECONOMIC STABILITY: TRANSPORTATION INSECURITY
IN THE PAST 12 MONTHS, HAS LACK OF TRANSPORTATION KEPT YOU FROM MEETINGS, WORK, OR FROM GETTING THINGS NEEDED FOR DAILY LIVING?: NO

## 2025-07-08 SDOH — ECONOMIC STABILITY: INCOME INSECURITY: IN THE LAST 12 MONTHS, WAS THERE A TIME WHEN YOU WERE NOT ABLE TO PAY THE MORTGAGE OR RENT ON TIME?: NO

## 2025-07-08 SDOH — ECONOMIC STABILITY: TRANSPORTATION INSECURITY

## 2025-07-08 SDOH — ECONOMIC STABILITY: FOOD INSECURITY: WITHIN THE PAST 12 MONTHS, YOU WORRIED THAT YOUR FOOD WOULD RUN OUT BEFORE YOU GOT MONEY TO BUY MORE.: NEVER TRUE

## 2025-07-08 SDOH — ECONOMIC STABILITY: TRANSPORTATION INSECURITY: IN THE PAST 12 MONTHS, HAS LACK OF TRANSPORTATION KEPT YOU FROM MEDICAL APPOINTMENTS OR FROM GETTING MEDICATIONS?: NO

## 2025-07-08 SDOH — ECONOMIC STABILITY: HOUSING INSECURITY: IN THE LAST 12 MONTHS, HOW MANY PLACES HAVE YOU LIVED?: 1

## 2025-07-08 SDOH — ECONOMIC STABILITY: HOUSING INSECURITY: IN THE LAST 12 MONTHS, WAS THERE A TIME WHEN YOU WERE NOT ABLE TO PAY THE MORTGAGE OR RENT ON TIME?: NO

## 2025-07-08 SDOH — ECONOMIC STABILITY: FOOD INSECURITY: WITHIN THE PAST 12 MONTHS, YOU WORRIED THAT YOUR FOOD WOULD RUN OUT BEFORE YOU GOT THE MONEY TO BUY MORE.: NEVER TRUE

## 2025-07-08 SDOH — ECONOMIC STABILITY: HOUSING INSECURITY: IN THE PAST 12 MONTHS HAS THE ELECTRIC, GAS, OIL, OR WATER COMPANY THREATENED TO SHUT OFF SERVICES IN YOUR HOME?: NO

## 2025-07-08 SDOH — ECONOMIC STABILITY: HOUSING INSECURITY
IN THE LAST 12 MONTHS, WAS THERE A TIME WHEN YOU DID NOT HAVE A STEADY PLACE TO SLEEP OR SLEPT IN A SHELTER (INCLUDING NOW)?: NO

## 2025-07-08 SDOH — ECONOMIC STABILITY: HOUSING INSECURITY

## 2025-07-08 SDOH — ECONOMIC STABILITY: FOOD INSECURITY

## 2025-07-08 SDOH — ECONOMIC STABILITY: TRANSPORTATION INSECURITY
IN THE PAST 12 MONTHS, HAS THE LACK OF TRANSPORTATION KEPT YOU FROM MEDICAL APPOINTMENTS OR FROM GETTING MEDICATIONS?: NO

## 2025-07-08 SDOH — ECONOMIC STABILITY: GENERAL

## 2025-07-08 ASSESSMENT — COGNITIVE AND FUNCTIONAL STATUS - GENERAL
CLIMB 3 TO 5 STEPS WITH RAILING: A LITTLE
MOBILITY SCORE: 23
DAILY ACTIVITIY SCORE: 24

## 2025-07-08 ASSESSMENT — SOCIAL DETERMINANTS OF HEALTH (SDOH): IN THE PAST 12 MONTHS, HAS THE ELECTRIC, GAS, OIL, OR WATER COMPANY THREATENED TO SHUT OFF SERVICE IN YOUR HOME?: NO

## 2025-07-08 ASSESSMENT — PAIN - FUNCTIONAL ASSESSMENT
PAIN_FUNCTIONAL_ASSESSMENT: 0-10
PAIN_FUNCTIONAL_ASSESSMENT: 0-10

## 2025-07-08 ASSESSMENT — PAIN SCALES - GENERAL
PAINLEVEL_OUTOF10: 0 - NO PAIN
PAINLEVEL_OUTOF10: 0 - NO PAIN

## 2025-07-08 ASSESSMENT — ACTIVITIES OF DAILY LIVING (ADL): LACK_OF_TRANSPORTATION: NO

## 2025-07-08 NOTE — HOSPITAL COURSE
Jamar Gill is a 85 y.o. male with past medical history of hemochromatosis with cirrhosis, dyslipidemia, primary hypertension, COPD, CKD stage IIIb cervical radiculopathy, GERD, gout presented for left lower extremity pain and was found to have acute left lower limb ischemia requiring vascular intervention.  Patient had intervention done with vascular surgery with thrombectomy and balloon angioplasty of popliteal artery by Dr. Leal on 7/2/2025.  Subsequently patient was transferred to ICU for hypotension and symptomatic acute blood loss anemia.  Patient did require blood transfusion for hypotension and symptomatic acute blood loss anemia.  His hemoglobin is stable and was transferred out of ICU to hospitalist service.  Patient has been doing well and suspected to have cardioembolic event.  He had echocardiogram with bubble study to rule out paradoxical embolism causing his symptoms as well as continuous monitoring for possible A-fib.  There was nothing caught on telemetry acutely regarding A-fib.  Vascular surgery recommended discontinuing aspirin and continuing Plavix, full dose statin and Eliquis 5 mg twice a day.  Patient is tolerating that.  His kidney function is not back to normal so we are discontinuing hydrochlorothiazide lisinopril and starting him on amlodipine 10 mg daily for blood pressure control.  He is in stable condition for discharge with follow-up with PCP, vascular surgery as outpatient.  He will get 30-day monitor for ruling out A-fib as well.    48 minutes spent in discharge timing

## 2025-07-08 NOTE — PROGRESS NOTES
84yo patient presented to [facility] ED for LLE pain and palor x 1 week concerning for acute limb ischemia 2/2 left popliteal artery, PT, and distal AT occlusion s/p lower extremity angiogram w/thrombectomy of left popliteal artery and PARKER, balloon angioplasty of AT & TPT, and drug coated balloon angioplasty to dSFA/pop (7/2/25).     Subjective Data:  Presents unaccompanied, alone in bedside chair. Denies abdominal pain and/or back pain. Endorses left thigh stiffness when he first starts to ambulate but improves as he walks. Endorses left pedal edema overnight. Denies pain. Left pedal paresthesias improving.     Overnight Events:    Afebrile. Hypertensive. Uneventful night. Meds to beds provided this morning, plan for discharge.      Objective Data:  Last Recorded Vitals:  Vitals:    07/08/25 0000 07/08/25 0400 07/08/25 0552 07/08/25 0800   BP: 134/54 149/71  159/74   BP Location: Left arm Left arm     Patient Position: Lying Lying     Pulse: 63 59  57   Resp: 16 18  16   Temp: 36 °C (96.8 °F) 36.3 °C (97.3 °F)  36.2 °C (97.2 °F)   TempSrc: Temporal Temporal  Temporal   SpO2: 99% 99%  98%   Weight:   90.2 kg (198 lb 13.7 oz)    Height:         Last I/O:  I/O last 3 completed shifts:  In: 1145.8 (12.7 mL/kg) [P.O.:720; I.V.:425.8 (4.7 mL/kg)]  Out: - (0 mL/kg)   Weight: 90.2 kg     Physical Exam  Vitals reviewed.   Constitutional:       General: He is awake.      Appearance: He is overweight.   HENT:      Head: Normocephalic.      Nose: Nose normal.      Mouth/Throat:      Mouth: Mucous membranes are moist.   Eyes:      Conjunctiva/sclera: Conjunctivae normal.   Cardiovascular:      Rate and Rhythm: Normal rate and regular rhythm.      Pulses:           Femoral pulses are 2+ on the right side and 2+ on the left side.       Dorsalis pedis pulses are 2+ on the right side and detected w/ Doppler on the left side.        Posterior tibial pulses are detected w/ Doppler on the left side.      Comments: Right groin incision  w/slight ecchymosis.  Pulmonary:      Effort: Pulmonary effort is normal.      Breath sounds: Normal breath sounds.   Musculoskeletal:      Left lower le+ Edema present.   Feet:      Left foot:      Skin integrity: No ulcer, blister, skin breakdown, erythema or warmth.   Skin:     General: Skin is warm and dry.   Neurological:      Mental Status: He is alert and oriented to person, place, and time.   Psychiatric:         Mood and Affect: Mood normal.         Behavior: Behavior is cooperative.     Last Labs:  CBC - 2025:  7:17 AM  5.4 8.3 173    25.9      CMP - 2025:  7:17 AM  9.2 5.7 38 --- 0.6   2.8 3.5 32 36      PTT - 2025:  2:07 PM  1.0   11.0 27     TROPHS   Date/Time Value Ref Range Status   2025 03:57 PM 12 0 - 20 ng/L Final   2025 03:18 PM 14 0 - 20 ng/L Final   2025 04:39 PM 12 0 - 20 ng/L Final     BNP   Date/Time Value Ref Range Status   2025 03:18 PM 12 0 - 99 pg/mL Final   2025 02:29 PM 97 0 - 99 pg/mL Final     HGBA1C   Date/Time Value Ref Range Status   2022 10:06 AM 5.4 % Final     Comment:          Diagnosis of Diabetes-Adults   Non-Diabetic: < or = 5.6%   Increased risk for developing diabetes: 5.7-6.4%   Diagnostic of diabetes: > or = 6.5%  .       Monitoring of Diabetes                Age (y)     Therapeutic Goal (%)   Adults:          >18           <7.0   Pediatrics:    13-18           <7.5                   7-12           <8.0                   0- 6            7.5-8.5   American Diabetes Association. Diabetes Care 33(S1), 2010.     2022 10:54 AM 5.4 % Final     Comment:          Diagnosis of Diabetes-Adults   Non-Diabetic: < or = 5.6%   Increased risk for developing diabetes: 5.7-6.4%   Diagnostic of diabetes: > or = 6.5%  .       Monitoring of Diabetes                Age (y)     Therapeutic Goal (%)   Adults:          >18           <7.0   Pediatrics:    13-18           <7.5                   7-12           <8.0                   0-  6            7.5-8.5   American Diabetes Association. Diabetes Care 33(S1), Jan 2010.       LDLCALC   Date/Time Value Ref Range Status   09/25/2024 01:07 PM   Final     Comment:     The calculation of LDL and VLDL are inaccurate when the Triglycerides are greater than 400 mg/dL or when the patient is non-fasting. If LDL measurement is necessary contact the testing laboratory for an alternative LDL assay.                                  Near   Borderline      AGE      Desirable  Optimal    High     High     Very High     0-19 Y     0 - 109     ---    110-129   >/= 130     ----    20-24 Y     0 - 119     ---    120-159   >/= 160     ----      >24 Y     0 -  99   100-129  130-159   160-189     >/=190     03/20/2024 10:31 AM   Final     Comment:     The calculation of LDL and VLDL are inaccurate when the Triglycerides are greater than 400 mg/dL or when the patient is non-fasting. If LDL measurement is necessary contact the testing laboratory for an alternative LDL assay.                                  Near   Borderline      AGE      Desirable  Optimal    High     High     Very High     0-19 Y     0 - 109     ---    110-129   >/= 130     ----    20-24 Y     0 - 119     ---    120-159   >/= 160     ----      >24 Y     0 -  99   100-129  130-159   160-189     >/=190       VLDL   Date/Time Value Ref Range Status   09/25/2024 01:07 PM   Final     Comment:     Unable to calculate VLDL.   03/20/2024 10:31 AM   Final     Comment:     Unable to calculate VLDL.   09/20/2023 10:15 AM 79 0 - 40 mg/dL Final   03/20/2023 11:42 AM SEE COMMENT 0 - 40 mg/dL Final     Comment:       Unable to calculate VLDL.   09/01/2022 10:06 AM SEE COMMENT 0 - 40 mg/dL Final     Comment:       Unable to calculate VLDL.      Diagnostic Imaging:   VASC US ANKLE BRACHIAL INDEX (JUAN) WITHOUT EXERCISE     Patient Name:      MARQUES Valenzuela Physician:  [id number] Tatiana Copeland MD  Study  Date:        7/3/2025             Ordering Provider:  [id number] KALEB OSHEA  MRN/PID:           [id number]             Fellow:  Accession#:        [id number]         Technologist:       Yolette Dean RVT, JOHN  Date of Birth/Age: 1940 / 85 years Technologist 2:  Gender:            M                    Encounter#:         [id number]  Admission Status:  Inpatient            Location Performed: [facility]        Diagnosis/ICD: Atherosclerosis of native arteries of extremities with rest pain,                 left leg-I70.222  Indication:    Post LLE intervention        Smoker:            Never.  Pertinent History: Previous Vasc Surg, HTN and Hyperlipidemia. post LLE                     angiogram with left Pop A & tib A thrombectomy & angioplasty                     to distal FA & DEBORA.        CONCLUSIONS:  Right Lower PVR: No evidence of arterial occlusive disease in the right lower extremity at rest. Normal digital perfusion noted. Multiphasic flow is noted in the right common femoral artery, right posterior tibial artery and right dorsalis pedis artery. The right digit tracing is diminished.  Left Lower PVR: Evidence of mild arterial occlusive disease in the left lower extremity at rest. Decreased digital perfusion noted. Monophasic flow is noted in the left posterior tibial artery and left dorsalis pedis artery. Multiphasic flow is noted in the left common femoral artery.     Imaging & Doppler Findings:     RIGHT Lower PVR                Pressures Ratios  Right Posterior Tibial (Ankle) 139 mmHg  0.99  Right Dorsalis Pedis (Ankle)   117 mmHg  0.84  Right Digit (Great Toe)        90 mmHg   0.64           LEFT Lower PVR                Pressures Ratios  Left Posterior Tibial (Ankle) 78 mmHg   0.56  Left Dorsalis Pedis (Ankle)   106 mmHg  0.76  Left Digit (Great Toe)        80 mmHg   0.57                              Right     Left  Brachial  Pressure 135 mmHg 140 mmHg           [id number] Tatiana Copeland MD  Electronically signed by [id number] Tatiana Copeland MD on 7/4/2025 at 9:15:04 AM   TRANSTHORACIC ECHOCARDIOGRAM REPORT     Patient Name:       MARQUES JESUS AMOR     Reading Physician:    [id number] Shai Clancy MD  Study Date:         7/5/2025             Ordering Provider:    [id number] AAMIR LOO  MRN/PID:            [id number]             Fellow:  Accession#:         [id number]         Nurse:  Date of Birth/Age:  1940 / 85 years Sonographer:          Yi Machado RDCS  Gender Assigned at  M                    Additional Staff:  Birth:  Height:             177.80 cm            Admit Date:  Weight:             88.00 kg             Admission Status:     Inpatient -                                                                 Routine  BSA / BMI:          2.06 m2 / 27.84      Department Location:  [facility] ICU Back                      kg/m2                                      (27-34)  Blood Pressure: 112 /60 mmHg     Study Type:    TRANSTHORACIC ECHO (TTE) COMPLETE  Diagnosis/ICD: Other chest pain-R07.89  Indication:    CP, concern for embolism  CPT Codes:     Echo Complete w Full Doppler-[id number]     Patient History:  Pertinent History: HTN, Hyperlipidemia and COPD.     Study Detail: The following Echo studies were performed: 2D, M-Mode, Doppler and                color flow.        PHYSICIAN INTERPRETATION:  Left Ventricle: The left ventricular systolic function is normal with a visually estimated ejection fraction of 55%. There is mild concentric left ventricular hypertrophy. There are no regional wall motion abnormalities. The left ventricular cavity size is normal. There is mild increased septal and mildly increased posterior left ventricular wall thickness. There is left ventricular concentric remodeling. Spectral Doppler shows a Grade I  (impaired relaxation pattern) of left ventricular diastolic filling with normal left atrial filling pressure.  Left Atrium: The left atrial size is mildly dilated.  Right Ventricle: The right ventricle is normal in size. There is normal right ventricular global systolic function.  Right Atrium: The right atrial size is normal.  Aortic Valve: The aortic valve is trileaflet. There is no evidence of aortic valve regurgitation.  Mitral Valve: The mitral valve is normal in structure. There is mild mitral valve regurgitation. The E Vmax is 1.13 m/s.  Tricuspid Valve: The tricuspid valve is structurally normal. There is trace tricuspid regurgitation. The Doppler estimated right ventricular systolic pressure (RVSP) is mildly elevated at 39 mmHg.  Pulmonic Valve: The pulmonic valve is structurally normal. There is no indication of pulmonic valve regurgitation.  Pericardium: No pericardial effusion noted.  Aorta: The aortic root is normal.  Systemic Veins: The inferior vena cava appears normal in size, with IVC inspiratory collapse greater than 50%.        CONCLUSIONS:   1. The left ventricular systolic function is normal with a visually estimated ejection fraction of 55%.   2. Spectral Doppler shows a Grade I (impaired relaxation pattern) of left ventricular diastolic filling with normal left atrial filling pressure.   3. There is normal right ventricular global systolic function.   4. The Doppler estimated RVSP is mildly elevated at 39 mmHg.     QUANTITATIVE DATA SUMMARY:     2D MEASUREMENTS:             Normal Ranges:  LAs:             4.09 cm     (2.7-4.0cm)  IVSd:            1.18 cm     (0.6-1.1cm)  LVPWd:           1.25 cm     (0.6-1.1cm)  LVIDd:           4.42 cm     (3.9-5.9cm)  LVIDs:           3.54 cm  LV Mass Index:   94.9 g/m2  LVEDV Index:     51.57 ml/m2  LV % FS          20.0 %        LEFT ATRIUM:                 Normal Ranges:  LA Area A4C:      18.0 cm2  LA Area A2C:      18.0 cm2  LA Volume Index:  26.0  ml/m2  LA Vol A4C:       47.4 ml  LA Vol A2C:       48.9 ml  LA Vol Index BSA: 23.4 ml/m2        M-MODE MEASUREMENTS:         Normal Ranges:  AoV Exc:             2.09 cm (1.5-2.5cm)        AORTA MEASUREMENTS:         Normal Ranges:  AoV Exc:            2.09 cm (1.5-2.5cm)        LV SYSTOLIC FUNCTION:                       Normal Ranges:  EF-A4C View:    49 % (>=55%)  EF-A2C View:    55 %  EF-Biplane:     51 %  EF-Visual:      55 %  LV EF Reported: 55 %        LV DIASTOLIC FUNCTION:            Normal Ranges:  MV Peak E:             1.13 m/s   (0.7-1.2 m/s)  MV Peak A:             1.12 m/s   (0.42-0.7 m/s)  E/A Ratio:             1.01       (1.0-2.2)  MV e'                  0.095 m/s  (>8.0)  MV lateral e'          0.10 m/s  MV medial e'           0.09 m/s  E/e' Ratio:            11.87      (<8.0)  PulmV Sys Trevor:         65.18 cm/s  PulmV Weber Trevor:        60.03 cm/s  PulmV S/D Trevor:         1.09        MITRAL VALVE:          Normal Ranges:  MV DT:        202 msec (150-240msec)        AORTIC VALVE:           Normal Ranges:  AoV Vmax:      1.28 m/s (<=1.7m/s)  AoV Peak P.5 mmHg (<20mmHg)  LVOT Max Trevor:  1.05 m/s (<=1.1m/s)  LVOT VTI:      23.38 cm  LVOT Diameter: 2.16 cm  (1.8-2.4cm)  AoV Area,Vmax: 3.03 cm2 (2.5-4.5cm2)        RIGHT VENTRICLE:  RV Basal 3.80 cm  RV Mid   2.00 cm  RV Major 7.3 cm  TAPSE:   18.0 mm  RV s'    0.17 m/s        TRICUSPID VALVE/RVSP:          Normal Ranges:  Peak TR Velocity:     3.02 m/s  Est. RA Pressure:     3 mmHg  RV Syst Pressure:     39 mmHg  (< 30mmHg)  IVC Diam:             1.95 cm        PULMONIC VALVE:         Normal Ranges:  PV Accel Time:  91 msec (>120ms)        PULMONARY VEINS:  PulmV Weber Trevor: 60.03 cm/s  PulmV S/D Trevor:  1.09  PulmV Sys Trevor:  65.18 cm/s        AORTA:  Asc Ao Diam 3.04 cm        [id number] Shai Clancy MD  Electronically signed on 2025 at 3:43:34 PM    Inpatient Medications:  Scheduled Medications[1]  PRN Medications[2]  Continuous Medications[3]      Assessment/Plan   Acute limb ischemia 2/2 left popliteal artery, PT, and distal AT occlusion  s/p lower extremity angiogram w/thrombectomy of left popliteal artery and PARKER, ballon angioplasty of AT & TPT, and drug coated baloon angioplasty to dSFA/pop (7/2/25)  Denies abdominal pain. Denies back pain. Endorses improvement in left thigh pain. Endorse mild left plantar surface paresthesias  and edema, improving.   Right groin w/slight ecchymosis. Left thigh edema improved.  Able to palpate right DP. Able to detect left DP with doppler. +1 pitting LLE edema.   CTA LLE 7/2/25 At the site of previous popliteal artery occlusion, there remains a severe stenosis (> 95%) due to circumferential soft plaque.  Distal to this the caliber improved but there remains large friable soft plaque extending directly into the lumen resulting in 50% stenosis on. There is minimally improved caliber of the tibioperoneal trunk demonstrating 50% stenosis distal to the anterior tibial artery takeoff. New severe thickening of the entire left vastus intermedius muscle, and to a lesser extent vastus lateralis muscle, with effacement of anterior and intramuscular fatty striations and some mild perimuscular stranding.  Mild ill-defined fat stranding in the left groin and along the proximal SFA representing small amount of non loculated blood products without discrete hematoma in the groin.  JUAN 7/3/25 Evidence of mild arterial occlusive disease in the left lower extremity at rest. Decreased digital perfusion noted.   Continue Eliquis 5mg BID. CBC stable.   Follow up as scheduled with Dr. Sonia Ritter on 8/12/25.      Discussed plan of care with Dr. Sonia Hopkins.   I spent 25 minutes in the professional and overall care of this patient.     Jeanette Wiseman, APRN-CNP  Vascular Surgery   [facility] [place]  [facility]       [1]   Scheduled medications   Medication Dose  Route Frequency    allopurinol  300 mg oral Daily    amLODIPine  5 mg oral Daily    apixaban  5 mg oral BID    atorvastatin  80 mg oral Nightly    clopidogrel  75 mg oral Daily    fenofibrate  160 mg oral Daily    melatonin  3 mg oral Nightly    morphine  4 mg intravenous Once    pantoprazole  40 mg oral BID AC    polyethylene glycol  17 g oral BID    venlafaxine XR  75 mg oral Daily   [2]   PRN medications   Medication    acetaminophen    Or    acetaminophen    Or    acetaminophen    alteplase    dextrose    dextrose    glucagon    glucagon    HYDROmorphone    ondansetron    Or    ondansetron   [3]   Continuous Medications   Medication Dose Last Rate

## 2025-07-08 NOTE — CARE PLAN
The patient's goals for the shift include comfort/safety/rest    The clinical goals for the shift include see POC      Problem: Pain - Adult  Goal: Verbalizes/displays adequate comfort level or baseline comfort level  Outcome: Progressing     Problem: Safety - Adult  Goal: Free from fall injury  Outcome: Progressing     Problem: Discharge Planning  Goal: Discharge to home or other facility with appropriate resources  Outcome: Progressing     Problem: Chronic Conditions and Co-morbidities  Goal: Patient's chronic conditions and co-morbidity symptoms are monitored and maintained or improved  Outcome: Progressing     Problem: Nutrition  Goal: Nutrient intake appropriate for maintaining nutritional needs  Outcome: Progressing     Problem: Skin  Goal: Promote skin healing  Outcome: Progressing     Problem: Fall/Injury  Goal: Not fall by end of shift  Outcome: Progressing    EOS: patient resting comfortably all night, call light within reach.

## 2025-07-08 NOTE — CARE PLAN
1015: Discharge paperwork provided to pt at this time. Medications delivered via meds to beds for pt on DC. Medications reviewed with pt at this time with pt. Reporting no further questions or concerns. Pt. Awaiting BIO to place home Holter monitor for discharge. Pt. Had no acute changes this shift and no c/o pain. Pt DC home. Holter monitor placed prior to pt DC by bio. Pt son to transport home. IV removed. All belongings taken with pt. At this time.

## 2025-07-08 NOTE — DISCHARGE SUMMARY
Discharge Diagnosis  Acute lower limb ischemia  Acute blood loss anemia  Acute kidney injury on chronic kidney disease  Hypotension           Issues Requiring Follow-Up  Follow-up with primary care provider and vascular surgery    Discharge Meds     Medication List      START taking these medications     amLODIPine 10 mg tablet; Commonly known as: Norvasc; Take 1 tablet (10   mg) by mouth once daily.; Start taking on: July 9, 2025   apixaban 5 mg tablet; Commonly known as: Eliquis; Take 1 tablet (5 mg)   by mouth 2 times a day.   atorvastatin 80 mg tablet; Commonly known as: Lipitor; Take 1 tablet (80   mg) by mouth once daily at bedtime.   clopidogrel 75 mg tablet; Commonly known as: Plavix; Take 1 tablet (75   mg) by mouth once daily.; Start taking on: July 9, 2025     CONTINUE taking these medications     allopurinol 300 mg tablet; Commonly known as: Zyloprim; Take 1 tablet   (300 mg) by mouth once daily.   cholecalciferol 125 mcg (5,000 units) tablet; Commonly known as: Vitamin   D-3   choline fenofibrate 135 mg DR capsule; Commonly known as: Trilipix; TAKE   1 CAPSULE BY MOUTH ONCE DAILY   cyanocobalamin 2,500 mcg tablet; Commonly known as: Vitamin B-12   fish oil concentrate 120-180 mg capsule; Commonly known as: Omega-3   methocarbamol 500 mg tablet; Commonly known as: Robaxin; Take 1 tablet   (500 mg) by mouth 4 times a day as needed for muscle spasms.   omeprazole 20 mg DR capsule; Commonly known as: PriLOSEC; TAKE 1 CAPSULE   BY MOUTH EVERY DAY   valACYclovir 1 gram tablet; Commonly known as: Valtrex   * venlafaxine  mg 24 hr capsule; Commonly known as: Effexor-XR;   TAKE 1 CAPSULE BY MOUTH ONCE DAILY.   * venlafaxine XR 75 mg 24 hr capsule; Commonly known as: Effexor-XR;   Take 1 capsule (75 mg) by mouth once daily for 15 days. Take with food.  * This list has 2 medication(s) that are the same as other medications   prescribed for you. Read the directions carefully, and ask your doctor or   other  74 yo woman with PMHx of HTN, T2DM (A1c 7.4%), CAD s/p CABG (LIMA to LAD, SVG to OM, SVG to PDA 2014 at [facility]), non-dilated ICM (EF 20-25%), severe mitral regurgitation s/p mitral clip (6/13/19 Dr. Newman), severe pulm HTN, CKD, hypothyroidism, who is presenting to ED after experiencing worsening SO[facility] and [facility] Of note, pt was recently discharged on 6/19 after having mitral clip procedure completed. She initially required BiPAP with improvement in her respiratory status following diuresis. Initiated on vasopressors and inotropes in CCU, which were subsequently weaned off. Infectious work up was negative.    #HFrEF likely 2/2 ICM with MR s/p alonso clip  - Pt tolerating torsemide 100 mg daily for now. Would aim for this on d/c as well.    - Check daily weights, Is and Os, and daily lactates.   - C/w hydralazine 50 mg TID and isordil 30 TID  - c/w spironolactone, and increase dose to 25 mg.   - No further cardiac testing at this time.    - Metoprolol can be switched to long acting.     #CAD  - C/w ASA and statin   - Pt with chest pain, but also with end stage cardiomyopathy and known coronary disease that cannot be intervened on. She should not have trops checked. No chest pain this morning.     #SVT likely Atrial tach v Atrial flutter   - Amio initially not given due to elevated LFTs likely in the setting of congestion.   - recheck LFTs.   - Monitor on tele.   - C/w Dig and metoprolol at current doses.   - Check dig level   - If pt has SVT again can consider amio.       Surendra Marshall MD  Cardiology Fellow - PGY 5  Text or Call: [phone]  For all New Consults and Questions:  [email or web address]   Login: [facility] care provider to review them with you.     STOP taking these medications     aspirin 81 mg EC tablet   lidocaine 5 % patch; Commonly known as: Lidoderm   lisinopriL-hydrochlorothiazide 20-25 mg tablet   meloxicam 15 mg tablet; Commonly known as: Mobic   simvastatin 10 mg tablet; Commonly known as: Zocor       Test Results Pending At Discharge  Pending Labs       No current pending labs.            Hospital Course  Jamar Gill is a 85 y.o. male with past medical history of hemochromatosis with cirrhosis, dyslipidemia, primary hypertension, COPD, CKD stage IIIb cervical radiculopathy, GERD, gout presented for left lower extremity pain and was found to have acute left lower limb ischemia requiring vascular intervention.  Patient had intervention done with vascular surgery with thrombectomy and balloon angioplasty of popliteal artery by Dr. Leal on 7/2/2025.  Subsequently patient was transferred to ICU for hypotension and symptomatic acute blood loss anemia.  Patient did require blood transfusion for hypotension and symptomatic acute blood loss anemia.  His hemoglobin is stable and was transferred out of ICU to hospitalist service.  Patient has been doing well and suspected to have cardioembolic event.  He had echocardiogram with bubble study to rule out paradoxical embolism causing his symptoms as well as continuous monitoring for possible A-fib.  There was nothing caught on telemetry acutely regarding A-fib.  Vascular surgery recommended discontinuing aspirin and continuing Plavix, full dose statin and Eliquis 5 mg twice a day.  Patient is tolerating that.  His kidney function is not back to normal so we are discontinuing hydrochlorothiazide lisinopril and starting him on amlodipine 10 mg daily for blood pressure control.  He is in stable condition for discharge with follow-up with PCP, vascular surgery as outpatient.  He will get 30-day monitor for ruling out A-fib as well.  Patient is refusing any home health  and we did leave a prescription for outpatient PT OT for him.    48 minutes spent in discharge timing    Pertinent Physical Exam At Time of Discharge  General: Not in acute distress, alert  HEENT: PERRLA, head intact and normocephalic  Neck: Normal to inspection  Lungs: Clear to auscultation, work of breathing within normal limit  Cardiac: Regular rate and rhythm  Abdomen: Soft nontender, positive bowel sounds  : Exam deferred  Skin: Intact  Hematology: No petechia or excessive ecchymosis  Musculoskeletal: Without significant trauma  Neurological: Alert awake oriented, no focal deficit, cranial nerves grossly intact  Psych: No suicidal ideation or homicidal ideation    Outpatient Follow-Up  Future Appointments   Date Time Provider Department Center   8/12/2025  2:00 PM [facility] VASC LAB 3 [id number] [facility]   8/12/2025  3:15 PM Sonia Clancy MD [id number] [place]   9/30/2025 10:00 AM Brenda Bains DO [id number] [place]   6/16/2026 10:00 AM Humberto Lewis MD [id number] [place]         Bayron Yañez MD

## 2025-07-08 NOTE — PROGRESS NOTES
Physical Therapy                 Therapy Communication Note    Patient Name: Jamar Gill  MRN: [id number]  Department: [facility] 3 N  Room: 3009/3009-A  Today's Date: 7/8/2025     Discipline: Physical Therapy    Missed Visit: PT Missed Visit: Yes     Missed Visit Reason: Missed Visit Reason: Other (Comment)    Missed Time: Attempt    Comment:  Pt busy with nursing then breakfast arrives. Will continue to see per POC.

## 2025-07-09 ENCOUNTER — PATIENT OUTREACH (OUTPATIENT)
Dept: PRIMARY CARE | Facility: CLINIC | Age: 85
End: 2025-07-09
Payer: MEDICARE

## 2025-07-09 NOTE — PROGRESS NOTES
Discharge Facility: [facility]  Discharge Diagnosis  Acute lower limb ischemia  Acute blood loss anemia  Acute kidney injury on chronic kidney disease  Hypotension    Admission Date: 7/1/2025  Discharge Date: 7/8/2025    PCP Appointment Date: 7/11/2025  Specialist Appointment Date:   -cardiology 8/12/2025    Hospital Encounter and Summary Linked: Yes  ED to Hosp-Admission (Discharged) with Bayron Yañez MD; Tyler Horowitz MD (07/01/2025)   See discharge assessment below for further details    Hospital Course  Jamar Gill is a 85 y.o. male with past medical history of hemochromatosis with cirrhosis, dyslipidemia, primary hypertension, COPD, CKD stage IIIb cervical radiculopathy, GERD, gout presented for left lower extremity pain and was found to have acute left lower limb ischemia requiring vascular intervention.  Patient had intervention done with vascular surgery with thrombectomy and balloon angioplasty of popliteal artery by Dr. Leal on 7/2/2025.  Subsequently patient was transferred to ICU for hypotension and symptomatic acute blood loss anemia.  Patient did require blood transfusion for hypotension and symptomatic acute blood loss anemia.  His hemoglobin is stable and was transferred out of ICU to hospitalist service.  Patient has been doing well and suspected to have cardioembolic event.  He had echocardiogram with bubble study to rule out paradoxical embolism causing his symptoms as well as continuous monitoring for possible A-fib.  There was nothing caught on telemetry acutely regarding A-fib.  Vascular surgery recommended discontinuing aspirin and continuing Plavix, full dose statin and Eliquis 5 mg twice a day.  Patient is tolerating that.  His kidney function is not back to normal so we are discontinuing hydrochlorothiazide lisinopril and starting him on amlodipine 10 mg daily for blood pressure control.  He is in stable condition for discharge with follow-up with PCP, vascular surgery as outpatient.   He will get 30-day monitor for ruling out A-fib as well.  Patient is refusing any home health and we did leave a prescription for outpatient PT OT for him.    Wrap Up  Wrap Up Additional Comments: CT Sspoke with patient. He was admitted to [facility] on 7/1/2025 with acute lower leg ischemia. Discharged home with no home care needs on 7/8/2025. PAtient stated that he was doing better. Denies any chest pains or shortness of breath. Patient denied any leg pain. Reviewed medications. He reports recieving his medications prior to leaving the hospital. Patient is scheduled for a hospital follow up with PCP on 7/11/2025.Patient voiced no questions or concerns at this time. Patient has my contact information for non- emergent issues that may arise. (7/9/2025  9:27 AM)  Call End Time: 0938 (7/9/2025  9:27 AM)    Engagement  Call Start Time: 0927 (7/9/2025  9:27 AM)    Medications  Medications reviewed with patient/caregiver?: Yes (7/9/2025  9:27 AM)  Is the patient having any side effects they believe may be caused by any medication additions or changes?: No (7/9/2025  9:27 AM)  Does the patient have all medications ordered at discharge?: Yes (7/9/2025  9:27 AM)  Care Management Interventions: No intervention needed (7/9/2025  9:27 AM)  Prescription Comments: start: amlodipine, lipitor, plavix and eliquis (7/9/2025  9:27 AM)  Is the patient taking all medications as directed (includes completed medication regime)?: Yes (7/9/2025  9:27 AM)  Medication Comments: see med list (7/9/2025  9:27 AM)    Appointments  Does the patient have a primary care provider?: Yes (7/9/2025  9:27 AM)  Care Management Interventions: Verified appointment date/time/provider (7/9/2025  9:27 AM)  Has the patient kept scheduled appointments due by today?: Yes (7/9/2025  9:27 AM)  Care Management Interventions: Advised patient to keep appointment (7/9/2025  9:27 AM)    Self Management  What is the home health agency?: none (7/9/2025  9:27 AM)  Has home  health visited the patient within 72 hours of discharge?: Not applicable (7/9/2025  9:27 AM)  What Durable Medical Equipment (DME) was ordered?: n/a (7/9/2025  9:27 AM)    Patient Teaching  Does the patient have access to their discharge instructions?: Yes (7/9/2025  9:27 AM)  Care Management Interventions: Reviewed instructions with patient (7/9/2025  9:27 AM)  What is the patient's perception of their health status since discharge?: Improving (7/9/2025  9:27 AM)  Is the patient/caregiver able to teach back the hierarchy of who to call/visit for symptoms/problems? PCP, Specialist, Home Health nurse, Urgent Care, ED, 911: Yes (7/9/2025  9:27 AM)  Patient/Caregiver Education Comments: see wrap up (7/9/2025  9:27 AM)

## 2025-07-11 ENCOUNTER — OFFICE VISIT (OUTPATIENT)
Dept: PRIMARY CARE | Facility: CLINIC | Age: 85
End: 2025-07-11
Payer: MEDICARE

## 2025-07-11 VITALS
SYSTOLIC BLOOD PRESSURE: 126 MMHG | DIASTOLIC BLOOD PRESSURE: 62 MMHG | OXYGEN SATURATION: 98 % | TEMPERATURE: 97.8 F | BODY MASS INDEX: 26.55 KG/M2 | RESPIRATION RATE: 16 BRPM | WEIGHT: 196 LBS | HEIGHT: 72 IN | HEART RATE: 72 BPM

## 2025-07-11 DIAGNOSIS — I10 PRIMARY HYPERTENSION: ICD-10-CM

## 2025-07-11 DIAGNOSIS — E78.2 MIXED HYPERLIPIDEMIA: ICD-10-CM

## 2025-07-11 DIAGNOSIS — D64.9 ANEMIA, UNSPECIFIED TYPE: ICD-10-CM

## 2025-07-11 DIAGNOSIS — I99.8 ACUTE LOWER LIMB ISCHEMIA: ICD-10-CM

## 2025-07-11 DIAGNOSIS — M10.9 GOUT, UNSPECIFIED CAUSE, UNSPECIFIED CHRONICITY, UNSPECIFIED SITE: ICD-10-CM

## 2025-07-11 DIAGNOSIS — N18.31 CHRONIC KIDNEY DISEASE, STAGE 3A (MULTI): Primary | ICD-10-CM

## 2025-07-11 DIAGNOSIS — I73.9 PERIPHERAL VASCULAR DISEASE: ICD-10-CM

## 2025-07-11 DIAGNOSIS — M54.12 CERVICAL RADICULOPATHY: ICD-10-CM

## 2025-07-11 PROCEDURE — 1036F TOBACCO NON-USER: CPT | Performed by: INTERNAL MEDICINE

## 2025-07-11 PROCEDURE — 1160F RVW MEDS BY RX/DR IN RCRD: CPT | Performed by: INTERNAL MEDICINE

## 2025-07-11 PROCEDURE — 1159F MED LIST DOCD IN RCRD: CPT | Performed by: INTERNAL MEDICINE

## 2025-07-11 PROCEDURE — 99496 TRANSJ CARE MGMT HIGH F2F 7D: CPT | Performed by: INTERNAL MEDICINE

## 2025-07-11 PROCEDURE — 3078F DIAST BP <80 MM HG: CPT | Performed by: INTERNAL MEDICINE

## 2025-07-11 PROCEDURE — 3074F SYST BP LT 130 MM HG: CPT | Performed by: INTERNAL MEDICINE

## 2025-07-11 PROCEDURE — 1111F DSCHRG MED/CURRENT MED MERGE: CPT | Performed by: INTERNAL MEDICINE

## 2025-07-11 RX ORDER — ALLOPURINOL 100 MG/1
100 TABLET ORAL DAILY
Qty: 90 TABLET | Refills: 3 | Status: SHIPPED | OUTPATIENT
Start: 2025-07-11 | End: 2025-07-11 | Stop reason: SDUPTHER

## 2025-07-11 RX ORDER — ALLOPURINOL 100 MG/1
100 TABLET ORAL DAILY
Qty: 90 TABLET | Refills: 3 | Status: SHIPPED | OUTPATIENT
Start: 2025-07-11

## 2025-07-11 ASSESSMENT — PATIENT HEALTH QUESTIONNAIRE - PHQ9
SUM OF ALL RESPONSES TO PHQ9 QUESTIONS 1 AND 2: 0
2. FEELING DOWN, DEPRESSED OR HOPELESS: NOT AT ALL
1. LITTLE INTEREST OR PLEASURE IN DOING THINGS: NOT AT ALL

## 2025-07-11 ASSESSMENT — ENCOUNTER SYMPTOMS
COUGH: 0
FEVER: 0
FATIGUE: 1
SHORTNESS OF BREATH: 0

## 2025-07-11 NOTE — PROGRESS NOTES
Subjective   Jamar Gill is a 85 y.o. male who presents for Hospital Follow-up.    HPI   Hospitalized [facility] 7/1/25 - 7/8/25  Dx: Acute lower limb ischemia, acute blood loss anemia, LISBET oh chronic kidney disease, Hypotension  Labs, CTA chest, US, EKG, TTE, Holter monitor  Med changes: Amlodipine, Eliquis, Atorvastatin, Plavix. ASA, Lisinopril-hydrochlorothiazide, Meloxicam, Simvastatin dc'd  Follow up: PCP, Vascular, PT/OT.      Review of Systems   Constitutional:  Positive for fatigue. Negative for fever.   Respiratory:  Negative for cough and shortness of breath.    Cardiovascular:  Positive for leg swelling. Negative for chest pain.   All other systems reviewed and are negative.      Health Maintenance Due   Topic Date Due    Hepatitis A Vaccines (1 of 2 - Risk 2-dose series) Never done    CKD: Urine Protein Screening  Never done    Hepatitis B Vaccines (1 of 3 - Risk 3-dose series) Never done    RSV High Risk: (Elderly (60+) or Pregnant Population) (1 - 1-dose 75+ series) Never done    COVID-19 Vaccine (4 - 2024-25 season) 09/01/2024       Objective   /62   Pulse 72   Temp 36.6 °C (97.8 °F)   Resp 16   Ht 1.829 m (6')   Wt 88.9 kg (196 lb)   SpO2 98%   BMI 26.58 kg/m²     Physical Exam    Assessment/Plan   Problem List Items Addressed This Visit       Chronic kidney disease, stage 3a (Multi) - Primary    Relevant Orders    Comprehensive Metabolic Panel    Gout    Relevant Medications    allopurinol (Zyloprim) 100 mg tablet    Hyperlipidemia    Relevant Orders    Lipid Panel    Hypertension    Cervical radiculopathy    Acute lower limb ischemia     Other Visit Diagnoses         Anemia, unspecified type        Relevant Orders    CBC      Peripheral vascular disease        Relevant Orders    Lipid Panel          Reviewed records  Follow up with vascular  Answered questions  Repeat labs in 2 weeks  Discussed that due to medications will most likely need to put off pain management procedure for  cerv radiculopathy  Will discuss timing with vascular  Complete ziopatch  Stable based on symptoms and exam.  Continue established treatment plan and follow up at least yearly.

## 2025-07-16 ENCOUNTER — PATIENT OUTREACH (OUTPATIENT)
Dept: PRIMARY CARE | Facility: CLINIC | Age: 85
End: 2025-07-16
Payer: MEDICARE

## 2025-07-16 NOTE — PROGRESS NOTES
Confirmation of at least 2 patient identifiers.    Completed telephonic follow-up with patient after recent visit with Dr Bains    Spoke to patient during outreach call.    Patient reports feeling: Improved    Patient has questions or concerns about medications: No    Have all prescribed medications been filled? Yes    Patient has necessary resources to manage their care? Yes    Patient has questions or concerns? No    Next care management follow-up approximately within one month.  Care  information provided to patient.

## 2025-07-17 ENCOUNTER — TELEPHONE (OUTPATIENT)
Dept: PRIMARY CARE | Facility: CLINIC | Age: 85
End: 2025-07-17
Payer: MEDICARE

## 2025-07-17 NOTE — TELEPHONE ENCOUNTER
Pt left  requesting call back.  Returned call to pt.  Stated he had been taking Allopurinol 300mg every day.  100mg Rx sent 7/11/25.  Questioned change.  Clarified verbally with Dr. Bains.  Stated dose changed during last appt d/t kidney function.  Pt made aware and voiced understanding.

## 2025-07-26 LAB
ALBUMIN SERPL-MCNC: 4.3 G/DL (ref 3.6–5.1)
ALP SERPL-CCNC: 50 U/L (ref 35–144)
ALT SERPL-CCNC: 26 U/L (ref 9–46)
ANION GAP SERPL CALCULATED.4IONS-SCNC: 9 MMOL/L (CALC) (ref 7–17)
AST SERPL-CCNC: 25 U/L (ref 10–35)
BILIRUB SERPL-MCNC: 0.6 MG/DL (ref 0.2–1.2)
BUN SERPL-MCNC: 25 MG/DL (ref 7–25)
CALCIUM SERPL-MCNC: 9.1 MG/DL (ref 8.6–10.3)
CHLORIDE SERPL-SCNC: 108 MMOL/L (ref 98–110)
CHOLEST SERPL-MCNC: 153 MG/DL
CHOLEST/HDLC SERPL: 4.4 (CALC)
CO2 SERPL-SCNC: 26 MMOL/L (ref 20–32)
CREAT SERPL-MCNC: 1.25 MG/DL (ref 0.7–1.22)
EGFRCR SERPLBLD CKD-EPI 2021: 57 ML/MIN/1.73M2
ERYTHROCYTE [DISTWIDTH] IN BLOOD BY AUTOMATED COUNT: 15.9 % (ref 11–15)
GLUCOSE SERPL-MCNC: 92 MG/DL (ref 65–99)
HCT VFR BLD AUTO: 33.9 % (ref 38.5–50)
HDLC SERPL-MCNC: 35 MG/DL
HGB BLD-MCNC: 10.8 G/DL (ref 13.2–17.1)
LDLC SERPL CALC-MCNC: 92 MG/DL (CALC)
MCH RBC QN AUTO: 31.3 PG (ref 27–33)
MCHC RBC AUTO-ENTMCNC: 31.9 G/DL (ref 32–36)
MCV RBC AUTO: 98.3 FL (ref 80–100)
NONHDLC SERPL-MCNC: 118 MG/DL (CALC)
PLATELET # BLD AUTO: 181 THOUSAND/UL (ref 140–400)
PMV BLD REES-ECKER: 10.3 FL (ref 7.5–12.5)
POTASSIUM SERPL-SCNC: 3.9 MMOL/L (ref 3.5–5.3)
PROT SERPL-MCNC: 6.4 G/DL (ref 6.1–8.1)
RBC # BLD AUTO: 3.45 MILLION/UL (ref 4.2–5.8)
SODIUM SERPL-SCNC: 143 MMOL/L (ref 135–146)
TRIGL SERPL-MCNC: 160 MG/DL
WBC # BLD AUTO: 5.1 THOUSAND/UL (ref 3.8–10.8)

## 2025-07-30 ENCOUNTER — RESULTS FOLLOW-UP (OUTPATIENT)
Dept: PRIMARY CARE | Facility: CLINIC | Age: 85
End: 2025-07-30
Payer: MEDICARE

## 2025-07-30 ENCOUNTER — TELEPHONE (OUTPATIENT)
Dept: PRIMARY CARE | Facility: CLINIC | Age: 85
End: 2025-07-30
Payer: MEDICARE

## 2025-07-30 DIAGNOSIS — I70.222 CRITICAL LIMB ISCHEMIA OF LEFT LOWER EXTREMITY: ICD-10-CM

## 2025-07-30 DIAGNOSIS — I99.8 ACUTE LOWER LIMB ISCHEMIA: ICD-10-CM

## 2025-07-30 RX ORDER — ATORVASTATIN CALCIUM 80 MG/1
80 TABLET, FILM COATED ORAL NIGHTLY
Qty: 90 TABLET | Refills: 3 | Status: SHIPPED | OUTPATIENT
Start: 2025-07-30

## 2025-07-30 RX ORDER — CLOPIDOGREL BISULFATE 75 MG/1
75 TABLET ORAL DAILY
Qty: 90 TABLET | Refills: 3 | Status: SHIPPED | OUTPATIENT
Start: 2025-07-30 | End: 2026-07-30

## 2025-07-30 RX ORDER — AMLODIPINE BESYLATE 10 MG/1
10 TABLET ORAL DAILY
Qty: 90 TABLET | Refills: 3 | Status: SHIPPED | OUTPATIENT
Start: 2025-07-30 | End: 2026-07-30

## 2025-07-30 NOTE — RESULT ENCOUNTER NOTE
Call patient his labs look good  Kidneys improving  Mild drop in blood count but expected due to recent hospitalization   How is he doing

## 2025-07-30 NOTE — TELEPHONE ENCOUNTER
----- Message from Brenda Bains sent at 7/30/2025  9:40 AM EDT -----  Call patient his labs look good  Kidneys improving  Mild drop in blood count but expected due to recent hospitalization   How is he doing  ----- Message -----  From: Durga RentMama Results In  Sent: 7/26/2025  12:22 AM EDT  To: Brenda Bains, DO    
Pt made aware.  Doing fine.  Scheduled for US next week.  
[facility]

## 2025-08-04 DIAGNOSIS — E78.5 HYPERLIPIDEMIA, UNSPECIFIED HYPERLIPIDEMIA TYPE: ICD-10-CM

## 2025-08-04 DIAGNOSIS — K21.9 GASTROESOPHAGEAL REFLUX DISEASE, UNSPECIFIED WHETHER ESOPHAGITIS PRESENT: ICD-10-CM

## 2025-08-04 DIAGNOSIS — F32.1 DEPRESSION, MAJOR, SINGLE EPISODE, MODERATE (MULTI): ICD-10-CM

## 2025-08-04 RX ORDER — VENLAFAXINE HYDROCHLORIDE 150 MG/1
150 CAPSULE, EXTENDED RELEASE ORAL DAILY
Qty: 90 CAPSULE | Refills: 3 | Status: SHIPPED | OUTPATIENT
Start: 2025-08-04

## 2025-08-04 RX ORDER — FENOFIBRIC ACID 135 MG/1
135 CAPSULE, DELAYED RELEASE ORAL DAILY
Qty: 90 CAPSULE | Refills: 3 | Status: SHIPPED | OUTPATIENT
Start: 2025-08-04

## 2025-08-04 RX ORDER — OMEPRAZOLE 20 MG/1
20 CAPSULE, DELAYED RELEASE ORAL DAILY
Qty: 90 CAPSULE | Refills: 3 | Status: SHIPPED | OUTPATIENT
Start: 2025-08-04

## 2025-08-12 ENCOUNTER — APPOINTMENT (OUTPATIENT)
Dept: CARDIOLOGY | Facility: CLINIC | Age: 85
End: 2025-08-12
Payer: MEDICARE

## 2025-08-12 ENCOUNTER — HOSPITAL ENCOUNTER (OUTPATIENT)
Dept: CARDIOLOGY | Facility: HOSPITAL | Age: 85
Discharge: HOME | End: 2025-08-12
Payer: MEDICARE

## 2025-08-12 ENCOUNTER — OFFICE VISIT (OUTPATIENT)
Dept: CARDIOLOGY | Facility: CLINIC | Age: 85
End: 2025-08-12
Payer: MEDICARE

## 2025-08-12 VITALS
HEIGHT: 72 IN | WEIGHT: 196 LBS | HEART RATE: 67 BPM | OXYGEN SATURATION: 97 % | SYSTOLIC BLOOD PRESSURE: 130 MMHG | BODY MASS INDEX: 26.55 KG/M2 | DIASTOLIC BLOOD PRESSURE: 72 MMHG

## 2025-08-12 DIAGNOSIS — I73.9 PAD (PERIPHERAL ARTERY DISEASE): ICD-10-CM

## 2025-08-12 DIAGNOSIS — I70.202 POPLITEAL ARTERY OCCLUSION, LEFT: ICD-10-CM

## 2025-08-12 DIAGNOSIS — E78.49 OTHER HYPERLIPIDEMIA: ICD-10-CM

## 2025-08-12 DIAGNOSIS — I73.9 PERIPHERAL VASCULAR DISEASE, UNSPECIFIED: ICD-10-CM

## 2025-08-12 DIAGNOSIS — I73.9 PAD (PERIPHERAL ARTERY DISEASE): Primary | ICD-10-CM

## 2025-08-12 DIAGNOSIS — I99.8 ACUTE LOWER LIMB ISCHEMIA: ICD-10-CM

## 2025-08-12 DIAGNOSIS — R09.89 OTHER SPECIFIED SYMPTOMS AND SIGNS INVOLVING THE CIRCULATORY AND RESPIRATORY SYSTEMS: ICD-10-CM

## 2025-08-12 PROCEDURE — G2211 COMPLEX E/M VISIT ADD ON: HCPCS | Performed by: STUDENT IN AN ORGANIZED HEALTH CARE EDUCATION/TRAINING PROGRAM

## 2025-08-12 PROCEDURE — 99212 OFFICE O/P EST SF 10 MIN: CPT

## 2025-08-12 PROCEDURE — 93922 UPR/L XTREMITY ART 2 LEVELS: CPT

## 2025-08-12 PROCEDURE — 99214 OFFICE O/P EST MOD 30 MIN: CPT | Performed by: STUDENT IN AN ORGANIZED HEALTH CARE EDUCATION/TRAINING PROGRAM

## 2025-08-12 PROCEDURE — 1159F MED LIST DOCD IN RCRD: CPT | Performed by: STUDENT IN AN ORGANIZED HEALTH CARE EDUCATION/TRAINING PROGRAM

## 2025-08-12 PROCEDURE — 3075F SYST BP GE 130 - 139MM HG: CPT | Performed by: STUDENT IN AN ORGANIZED HEALTH CARE EDUCATION/TRAINING PROGRAM

## 2025-08-12 PROCEDURE — 3078F DIAST BP <80 MM HG: CPT | Performed by: STUDENT IN AN ORGANIZED HEALTH CARE EDUCATION/TRAINING PROGRAM

## 2025-08-15 LAB — BODY SURFACE AREA: 2.14 M2

## 2025-08-15 PROCEDURE — 93272 ECG/REVIEW INTERPRET ONLY: CPT | Performed by: INTERNAL MEDICINE

## 2025-09-05 LAB
ATRIAL RATE: 60 BPM
P AXIS: 70 DEGREES
P OFFSET: 124 MS
P ONSET: 63 MS
PR INTERVAL: 286 MS
Q ONSET: 206 MS
QRS COUNT: 10 BEATS
QRS DURATION: 162 MS
QT INTERVAL: 470 MS
QTC CALCULATION(BAZETT): 470 MS
QTC FREDERICIA: 470 MS
R AXIS: -66 DEGREES
T AXIS: 18 DEGREES
T OFFSET: 441 MS
VENTRICULAR RATE: 60 BPM

## 2025-09-30 ENCOUNTER — APPOINTMENT (OUTPATIENT)
Dept: PRIMARY CARE | Facility: CLINIC | Age: 85
End: 2025-09-30
Payer: MEDICARE

## 2026-06-16 ENCOUNTER — APPOINTMENT (OUTPATIENT)
Dept: CARDIOLOGY | Facility: CLINIC | Age: 86
End: 2026-06-16
Payer: MEDICARE

## (undated) DEVICE — CATHETER, SASUKE, DBL LUMEN, 3.3FR X 145CM

## (undated) DEVICE — CATHETER, BALLOON, INPACT AV, DCB 018 5.0 MM X 60 MM X 130 CM

## (undated) DEVICE — DEVICE, CLOSURE, PERCLOSE, PROSTYLE

## (undated) DEVICE — SHIELD, PERIPHERAL, RADPAD, 11 X 34IN, W/ ABSORBENT, ORANGE, STERILE

## (undated) DEVICE — Device

## (undated) DEVICE — DELIVERY WIRE, BAREWIRE, FILTER, WORKHORSE, 0.035 X 315CM

## (undated) DEVICE — GUIDEWIRE, HI-TORQUE, COMMAND ES, 0.014 X 300CM

## (undated) DEVICE — DEPOT, BAG 1400ML, 48IN LG BORE, AIRLESS MALE, MACRO DRIP

## (undated) DEVICE — PROTECTION SYSTEM, EMBOSHIELD NAV6, SMALL VESSEL 5MM

## (undated) DEVICE — GUIDEWIRE, GOLD TIP, 45 DEG ANGLE, 300 CM, 3CM TIP

## (undated) DEVICE — SHEATH, PINNACLE, W/.038 GW 10CM, 5FR INTRODUCER, 2.5 CM DIALATOR

## (undated) DEVICE — INFLATION DEVICE, BASIXCOMPAX, 30 ATM/BAR, 20ML, MAP152, 12IN TUBING

## (undated) DEVICE — CATHETER, DIAGNOSTIC, 5FR, IM

## (undated) DEVICE — MANIFOLD KIT, CUSTOM (SJM)

## (undated) DEVICE — CATHETER, CAT 7 130 TORQ, PLUS LIGHTNING, NO CE

## (undated) DEVICE — DEVICE, CLOSURE, PENCLOSE, PROGLIDE 6FR

## (undated) DEVICE — 320MG FRESENIUS KABI IODIXANOL INJECTION, USP, 100ML BOTTLE X 10 (NDC 65219-383-10)

## (undated) DEVICE — CATHETER, NAVICROSS, 0.018 X 150CM, ANGLE

## (undated) DEVICE — SHEATH, 45CM, W/DILATOR, 7 FR DIA, ST CURVE STYLE W/CROSS-CUT VALVE

## (undated) DEVICE — CANISTER, INDIGO MAX, FOR PMXENGN, NON-STERILE

## (undated) DEVICE — CUP, MEDICINE, CLEAR, 2 OZ, STERILE

## (undated) DEVICE — ACCESS KIT, S-MAK MINI, 4FR 10CM 0.018IN 40CM, NT/PT, ECHO ENHANCE NEEDLE

## (undated) DEVICE — CATHETER, NAVICROSS, 0.035 X 150CM, ANGLED TIP

## (undated) DEVICE — GUIDE WIRE, 260CM, HI-TORQUE, VERSACORE, MODIFIED J

## (undated) DEVICE — TUBING, PRESSURE MONITOR, 24IN/61CM, FIXED FEMALE LUER

## (undated) DEVICE — 50ML IODIXANOL INJECTION (NDC 65219-383-05)

## (undated) DEVICE — CATHETER, ANGIO, INFINITI, MPA2, 5 FR X 100 CM